# Patient Record
Sex: MALE | Race: WHITE | NOT HISPANIC OR LATINO | Employment: FULL TIME | URBAN - METROPOLITAN AREA
[De-identification: names, ages, dates, MRNs, and addresses within clinical notes are randomized per-mention and may not be internally consistent; named-entity substitution may affect disease eponyms.]

---

## 2017-01-04 ENCOUNTER — ALLSCRIPTS OFFICE VISIT (OUTPATIENT)
Dept: OTHER | Facility: OTHER | Age: 57
End: 2017-01-04

## 2017-03-24 ENCOUNTER — GENERIC CONVERSION - ENCOUNTER (OUTPATIENT)
Dept: OTHER | Facility: OTHER | Age: 57
End: 2017-03-24

## 2017-05-18 ENCOUNTER — ALLSCRIPTS OFFICE VISIT (OUTPATIENT)
Dept: OTHER | Facility: OTHER | Age: 57
End: 2017-05-18

## 2017-08-31 ENCOUNTER — ALLSCRIPTS OFFICE VISIT (OUTPATIENT)
Dept: OTHER | Facility: OTHER | Age: 57
End: 2017-08-31

## 2017-09-08 ENCOUNTER — GENERIC CONVERSION - ENCOUNTER (OUTPATIENT)
Dept: OTHER | Facility: OTHER | Age: 57
End: 2017-09-08

## 2017-09-08 DIAGNOSIS — E78.5 HYPERLIPIDEMIA: ICD-10-CM

## 2017-09-08 DIAGNOSIS — J30.89 OTHER ALLERGIC RHINITIS: ICD-10-CM

## 2017-09-08 DIAGNOSIS — E66.01 MORBID (SEVERE) OBESITY DUE TO EXCESS CALORIES (HCC): ICD-10-CM

## 2017-09-08 DIAGNOSIS — Z12.5 ENCOUNTER FOR SCREENING FOR MALIGNANT NEOPLASM OF PROSTATE: ICD-10-CM

## 2017-11-21 ENCOUNTER — GENERIC CONVERSION - ENCOUNTER (OUTPATIENT)
Dept: OTHER | Facility: OTHER | Age: 57
End: 2017-11-21

## 2017-11-21 ENCOUNTER — LAB CONVERSION - ENCOUNTER (OUTPATIENT)
Dept: OTHER | Facility: OTHER | Age: 57
End: 2017-11-21

## 2017-11-21 LAB
CHOLEST SERPL-MCNC: 133 MG/DL
CHOLEST/HDLC SERPL: 3.2 (CALC)
CREATININE, RANDOM URINE (HISTORICAL): 85 MG/DL (ref 20–370)
HBA1C MFR BLD HPLC: 7.4 % OF TOTAL HGB
HDLC SERPL-MCNC: 42 MG/DL
LDL CHOLESTEROL (HISTORICAL): 73 MG/DL (CALC)
MAGNESIUM, UR (HISTORICAL): 0.5 MG/DL
MICROALBUMIN/CREATININE RATIO (HISTORICAL): 6 MCG/MG CREAT
NON-HDL-CHOL (CHOL-HDL) (HISTORICAL): 91 MG/DL (CALC)
PROSTATE SPECIFIC ANTIGEN TOTAL (HISTORICAL): 0.5 NG/ML
TRIGL SERPL-MCNC: 95 MG/DL

## 2017-12-13 ENCOUNTER — GENERIC CONVERSION - ENCOUNTER (OUTPATIENT)
Dept: OTHER | Facility: OTHER | Age: 57
End: 2017-12-13

## 2017-12-30 LAB
LEFT EYE DIABETIC RETINOPATHY: NORMAL
RIGHT EYE DIABETIC RETINOPATHY: NORMAL

## 2018-01-12 NOTE — RESULT NOTES
Verified Results  (1) COMPREHENSIVE METABOLIC PANEL [id number] 13:[address] [place] [address] [place]     Test Name Result Flag Reference   GLUCOSE 136 mg/dL H 65-99   Fasting reference interval     For someone without known diabetes, a glucose  value >125 mg/dL indicates that they may have  diabetes and this should be confirmed with a  follow-up test    UREA NITROGEN (BUN) 11 mg/dL  7-25   CREATININE 0 96 mg/dL  0 70-1 33   For patients >52years of age, the reference limit  for Creatinine is approximately 13% higher for people  identified as -American  eGFR NON-AFR  AMERICAN 87 mL/min/1 73m2  > OR = 60   eGFR AFRICAN AMERICAN 101 mL/min/1 73m2  > OR = 60   BUN/CREATININE RATIO   8-00   NOT APPLICABLE (calc)   SODIUM 139 mmol/L  135-146   POTASSIUM 4 3 mmol/L  3 5-5 3   CHLORIDE 102 mmol/L     CARBON DIOXIDE 31 mmol/L  20-31   CALCIUM 9 5 mg/dL  8 6-10 3   PROTEIN, TOTAL 7 0 g/dL  6 1-8 1   ALBUMIN 4 3 g/dL  3 6-5 1   GLOBULIN 2 7 g/dL (calc)  1 9-3 7   ALBUMIN/GLOBULIN RATIO 1 6 (calc)  1 0-2 5   BILIRUBIN, TOTAL 0 7 mg/dL  0 2-1 2   ALKALINE PHOSPHATASE 73 U/L     AST 12 U/L  10-35   ALT 10 U/L  9-46       Plan  Poorly controlled type 2 diabetes mellitus    · (Q) MICROALBUMIN, RANDOM URINE (W/CREATININE) ; every 1 year;  Last  [id number]; Status:Active

## 2018-01-13 VITALS
BODY MASS INDEX: 35.93 KG/M2 | WEIGHT: 251 LBS | HEART RATE: 76 BPM | RESPIRATION RATE: 18 BRPM | TEMPERATURE: 99.5 F | HEIGHT: 70 IN | DIASTOLIC BLOOD PRESSURE: 78 MMHG | SYSTOLIC BLOOD PRESSURE: 134 MMHG

## 2018-01-13 VITALS
SYSTOLIC BLOOD PRESSURE: 130 MMHG | WEIGHT: 247.04 LBS | RESPIRATION RATE: 18 BRPM | HEART RATE: 100 BPM | BODY MASS INDEX: 35.37 KG/M2 | HEIGHT: 70 IN | DIASTOLIC BLOOD PRESSURE: 80 MMHG

## 2018-01-14 VITALS
SYSTOLIC BLOOD PRESSURE: 116 MMHG | HEART RATE: 72 BPM | DIASTOLIC BLOOD PRESSURE: 72 MMHG | TEMPERATURE: 98.5 F | BODY MASS INDEX: 34.09 KG/M2 | WEIGHT: 243.5 LBS | HEIGHT: 71 IN | OXYGEN SATURATION: 99 %

## 2018-01-22 VITALS
SYSTOLIC BLOOD PRESSURE: 120 MMHG | DIASTOLIC BLOOD PRESSURE: 80 MMHG | RESPIRATION RATE: 16 BRPM | OXYGEN SATURATION: 98 % | HEIGHT: 70 IN | WEIGHT: 247.25 LBS | BODY MASS INDEX: 35.4 KG/M2 | HEART RATE: 81 BPM

## 2018-01-24 VITALS
HEIGHT: 70 IN | RESPIRATION RATE: 18 BRPM | DIASTOLIC BLOOD PRESSURE: 84 MMHG | HEART RATE: 84 BPM | TEMPERATURE: 97.3 F | SYSTOLIC BLOOD PRESSURE: 136 MMHG | BODY MASS INDEX: 36.86 KG/M2 | WEIGHT: 257.5 LBS

## 2018-02-23 ENCOUNTER — TELEPHONE (OUTPATIENT)
Dept: FAMILY MEDICINE CLINIC | Facility: CLINIC | Age: 58
End: 2018-02-23

## 2018-02-23 NOTE — TELEPHONE ENCOUNTER
Is not typically that easy , that we just write a prescription for it  Talk to triage about how to get a new CPAP

## 2018-03-06 DIAGNOSIS — G47.33 SLEEP APNEA, OBSTRUCTIVE: Primary | ICD-10-CM

## 2019-01-16 DIAGNOSIS — E11.65 POORLY CONTROLLED DIABETES MELLITUS (HCC): Primary | ICD-10-CM

## 2019-01-16 RX ORDER — FLUTICASONE PROPIONATE 50 MCG
2 SPRAY, SUSPENSION (ML) NASAL DAILY PRN
COMMUNITY
Start: 2017-09-08 | End: 2022-05-27 | Stop reason: SDUPTHER

## 2019-01-18 ENCOUNTER — OFFICE VISIT (OUTPATIENT)
Dept: FAMILY MEDICINE CLINIC | Facility: CLINIC | Age: 59
End: 2019-01-18
Payer: COMMERCIAL

## 2019-01-18 VITALS
WEIGHT: 249 LBS | HEIGHT: 70 IN | DIASTOLIC BLOOD PRESSURE: 88 MMHG | HEART RATE: 92 BPM | OXYGEN SATURATION: 98 % | BODY MASS INDEX: 35.65 KG/M2 | SYSTOLIC BLOOD PRESSURE: 122 MMHG

## 2019-01-18 DIAGNOSIS — E66.01 MORBID OBESITY (HCC): ICD-10-CM

## 2019-01-18 DIAGNOSIS — Z23 FLU VACCINE NEED: ICD-10-CM

## 2019-01-18 DIAGNOSIS — E66.9 OBESITY (BMI 30-39.9): ICD-10-CM

## 2019-01-18 DIAGNOSIS — I10 HYPERTENSION, UNSPECIFIED TYPE: ICD-10-CM

## 2019-01-18 DIAGNOSIS — J45.20 MILD INTERMITTENT ASTHMA WITHOUT COMPLICATION: ICD-10-CM

## 2019-01-18 DIAGNOSIS — Z11.59 ENCOUNTER FOR HEPATITIS C SCREENING TEST FOR LOW RISK PATIENT: ICD-10-CM

## 2019-01-18 DIAGNOSIS — E11.65 POORLY CONTROLLED DIABETES MELLITUS (HCC): ICD-10-CM

## 2019-01-18 DIAGNOSIS — Z23 NEED FOR PNEUMOCOCCAL VACCINE: ICD-10-CM

## 2019-01-18 DIAGNOSIS — E11.65 POORLY CONTROLLED TYPE 2 DIABETES MELLITUS (HCC): Primary | ICD-10-CM

## 2019-01-18 DIAGNOSIS — E78.5 HYPERLIPIDEMIA, UNSPECIFIED HYPERLIPIDEMIA TYPE: ICD-10-CM

## 2019-01-18 DIAGNOSIS — G47.30 SLEEP APNEA, UNSPECIFIED TYPE: ICD-10-CM

## 2019-01-18 PROBLEM — E65 PANNUS, ABDOMINAL: Status: ACTIVE | Noted: 2017-12-13

## 2019-01-18 PROBLEM — J30.89 ENVIRONMENTAL AND SEASONAL ALLERGIES: Status: ACTIVE | Noted: 2017-09-08

## 2019-01-18 LAB
SL AMB POCT GLUCOSE BLD: 174
SL AMB POCT HEMOGLOBIN AIC: 9.3 (ref ?–6.5)

## 2019-01-18 PROCEDURE — 83036 HEMOGLOBIN GLYCOSYLATED A1C: CPT | Performed by: PHYSICIAN ASSISTANT

## 2019-01-18 PROCEDURE — 3008F BODY MASS INDEX DOCD: CPT | Performed by: PHYSICIAN ASSISTANT

## 2019-01-18 PROCEDURE — 90472 IMMUNIZATION ADMIN EACH ADD: CPT | Performed by: PHYSICIAN ASSISTANT

## 2019-01-18 PROCEDURE — 82948 REAGENT STRIP/BLOOD GLUCOSE: CPT | Performed by: PHYSICIAN ASSISTANT

## 2019-01-18 PROCEDURE — 3046F HEMOGLOBIN A1C LEVEL >9.0%: CPT | Performed by: PHYSICIAN ASSISTANT

## 2019-01-18 PROCEDURE — 90732 PPSV23 VACC 2 YRS+ SUBQ/IM: CPT | Performed by: PHYSICIAN ASSISTANT

## 2019-01-18 PROCEDURE — 3079F DIAST BP 80-89 MM HG: CPT | Performed by: PHYSICIAN ASSISTANT

## 2019-01-18 PROCEDURE — 90682 RIV4 VACC RECOMBINANT DNA IM: CPT | Performed by: PHYSICIAN ASSISTANT

## 2019-01-18 PROCEDURE — 1036F TOBACCO NON-USER: CPT | Performed by: PHYSICIAN ASSISTANT

## 2019-01-18 PROCEDURE — 99214 OFFICE O/P EST MOD 30 MIN: CPT | Performed by: PHYSICIAN ASSISTANT

## 2019-01-18 PROCEDURE — 3074F SYST BP LT 130 MM HG: CPT | Performed by: PHYSICIAN ASSISTANT

## 2019-01-18 PROCEDURE — 90471 IMMUNIZATION ADMIN: CPT | Performed by: PHYSICIAN ASSISTANT

## 2019-01-18 RX ORDER — ASCORBIC ACID 500 MG
TABLET ORAL DAILY
COMMUNITY

## 2019-01-18 NOTE — PROGRESS NOTES
[facility]  FAMILY PRACTICE OFFICE VISIT       NAME: Arnaud Olson  AGE: 60 y o  SEX: male       : 1960        MRN: [id number]    DATE: 2019  TIME: 10:28 AM    Assessment and Plan     Problem List Items Addressed This Visit     Asthma     Controlled without any medication currently  Will continue to monitor  Hyperlipidemia     I reviewed with the patient that his last labs were okay her when done just over a year ago  We discussed though the recommendation for statin use with the diagnosis of diabetes for cardiac protection  We can discuss further after his labs are done any returns for his next visit  Relevant Orders    Lipid Panel with Direct LDL reflex    Hypertension     Resolved after having gastric bypass surgery  Will continue to monitor  Relevant Orders    CBC and differential    Comprehensive metabolic panel    TSH, 3rd generation with Free T4 reflex    Lipid Panel with Direct LDL reflex    Microalbumin / creatinine urine ratio    Obesity (BMI 30-39  9)     Patient notes that he has been working on weight loss by juicing  He was encouraged to instead switch to eating small frequent meals that her bowels in protein and carbs  He should start exercising on a regular basis  We will continue to monitor  He is status post gastric bypass  Poorly controlled type 2 diabetes mellitus ([facility] [address] ) - Primary     Lab Results   Component Value Date    HGBA1C 9 3 (A) 2019     I reviewed with the patient that his diabetes is poorly controlled  His A1c has increased from 7 4% last year to 9 3% this year  He is only currently checking his blood sugars in the morning and reports readings of approximately 100 and 40s  He was encouraged to start checking his blood sugar 2 hr postprandial as well  His blood sugars fasting should be below 120, but would ideally eventually get below 100   His postprandial readings should be below 180, but ideally would get below 140  He was encouraged to continue with his metformin 1000 mg twice daily and was also started on Januvia 100 mg daily  He was initially referred to diabetes education  He was directed to stop using and instead start having small frequent meals throughout the day that are balanced in carbs and protein  He should also be exercising on a regular basis  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  Relevant Medications    sitaGLIPtin (JANUVIA) 100 mg tablet    Other Relevant Orders    POCT hemoglobin A1c (Completed)    CBC and differential    Comprehensive metabolic panel    TSH, 3rd generation with Free T4 reflex    Lipid Panel with Direct LDL reflex    Microalbumin / creatinine urine ratio    Ambulatory referral to Diabetic Education    POCT blood glucose (Completed)    Sleep apnea     Patient will continue to use CPAP nightly  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  He denies having any difficulty with feeling tired upon wakening or during the day             Other Visit Diagnoses     Poorly controlled diabetes mellitus ([address] )        Relevant Medications    sitaGLIPtin (JANUVIA) 100 mg tablet    Other Relevant Orders    POCT blood glucose (Completed)    Encounter for hepatitis C screening test for low risk patient        Relevant Orders    Hepatitis C antibody    Flu vaccine need        Relevant Orders    PREFERRED: influenza vaccine, [id number], quadrivalent, recombinant, PF, 0 5 mL, for patients 18 yr+ (FLUBLOK) (Completed)    Need for pneumococcal vaccine        Relevant Orders    PNEUMOCOCCAL POLYSACCHARIDE VACCINE 23-VALENT =>3YO SQ IM (Completed)          Poorly controlled type 2 diabetes mellitus ([facility] [address] )  Lab Results   Component Value Date    HGBA1C 9 3 (A) 01/18/2019     I reviewed with the patient that his diabetes is poorly controlled  His A1c has increased from 7 4% last year to 9 3% this year  He is only currently checking his blood sugars in the morning and reports readings of approximately 100 and 40s  He was encouraged to start checking his blood sugar 2 hr postprandial as well  His blood sugars fasting should be below 120, but would ideally eventually get below 100  His postprandial readings should be below 180, but ideally would get below 140  He was encouraged to continue with his metformin 1000 mg twice daily and was also started on Januvia 100 mg daily  He was initially referred to diabetes education  He was directed to stop using and instead start having small frequent meals throughout the day that are balanced in carbs and protein  He should also be exercising on a regular basis  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  Asthma  Controlled without any medication currently  Will continue to monitor  Sleep apnea  Patient will continue to use CPAP nightly  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  He denies having any difficulty with feeling tired upon wakening or during the day      Hypertension  Resolved after having gastric bypass surgery  Will continue to monitor  Hyperlipidemia  I reviewed with the patient that his last labs were okay her when done just over a year ago  We discussed though the recommendation for statin use with the diagnosis of diabetes for cardiac protection  We can discuss further after his labs are done any returns for his next visit  Obesity (BMI 30-39  9)  Patient notes that he has been working on weight loss by juicing  He was encouraged to instead switch to eating small frequent meals that her bowels in protein and carbs  He should start exercising on a regular basis  We will continue to monitor  He is status post gastric bypass  Chief Complaint     Chief Complaint   Patient presents with    Diabetes       History of Present Illness   Leroy Del Cid is a 60y o -year-old male who presents for follow-up on chronic problems  The patient presents today for follow-up on diabetes  At the last visit, A1c was 7 4% about a year ago  The patient reports that current diabetic medications include metformin 1000 mg twice daily  Fasting blood sugars in the morning are approximately 140s  Postprandial glucoses readings are about not checked  The patient denies hypoglycemic episodes  Last eye exam was about a year ago   Last foot exam was updated today  Today's A1c in the office is 9 3%  He notes that he has been juicing over the last month and notes that he has lost 8 pounds  He did it in the past and lost 20 pounds  He notes that he has higher glucose readings  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  He notes that needed by next Wednesday 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  The patient reports that current blood pressure medications has not been needed since surgery  The patient denies symptoms of poor control such as chest pain, shortness of breath, leg swelling, vision changes, headaches, or dizziness       The patient continues without any cholesterol medication  Last LDL was 73 in 11/2017  The patient feels that sleep apnea has been stable  Regular 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  The patient denies excessive daytime sleepiness, known apneas, or nonrestorative sleep  Since the patent's last visit, weight has decreased 8 pounds over the last month  Diet is reported to be primarily due juicing  Exercise occurs weekly for basketball  He is continuing to use powder to help prevent infection under his pannus  The patient reports that asthma control has been well-controlled - only has issues with colds  He denies a regular inhaler or albuterol inhaler  Review of Systems   Review of Systems   Constitutional: Negative for chills and fever  Respiratory: Negative for shortness of breath  Cardiovascular: Negative for chest pain, palpitations and leg swelling  Skin: Negative for rash  Neurological: Negative for dizziness and headaches  Psychiatric/Behavioral: Positive for sleep disturbance  Active Problem List     Patient Active Problem List   Diagnosis    Asthma    Basal cell carcinoma of skin    Environmental and seasonal allergies    Hyperlipidemia    Hypertension    Obesity (BMI 30-39  9)    Onychomycosis of toenail    Pannus, abdominal    Poorly controlled type 2 diabetes mellitus ([address] )    Sciatica of left side    Sleep apnea         Past Medical History:  No past medical history on file  Past Surgical History:  Past Surgical History:   Procedure Laterality Date    EAR SURGERY Right 12/2014    Basal cancer removal       Family History:  Family History   Problem Relation Age of Onset    Diabetes Family     Hypertension Family     Heart attack Family        Social History:  Social History     Social History    Marital status: /Civil Union     Spouse name: N/A    Number of children: N/A    Years of education: N/A     Occupational History    Not on file       Social History Main Topics    Smoking status: Former Smoker     Quit date: 1/1/1977    Smokeless tobacco: Never Used    Alcohol use Yes      Comment: Social     Drug use: No    Sexual activity: Not on file     Other Topics Concern    Not on file     Social History Narrative    No narrative on file       Objective     Vitals:    01/18/19 0820   BP: 122/88   BP Location: Left arm   Patient Position: Sitting   Cuff Size: Standard   Pulse: 92   SpO2: 98%   Weight: 113 kg (249 lb)   Height: 5' 10 1" (1 781 m)     Wt Readings from Last 3 Encounters:   01/18/19 113 kg (249 lb)   12/13/17 117 kg (257 lb 8 oz)   09/08/17 112 kg (247 lb 4 oz)       Physical Exam   Constitutional: He appears well-developed and well-nourished  No distress  Neck: Normal range of motion  Neck supple  No thyromegaly present  Cardiovascular: Normal rate, regular rhythm, normal heart sounds and intact distal pulses  No murmur heard  Pulmonary/Chest: Effort normal and breath sounds normal  He has no wheezes  He has no rales  Musculoskeletal: He exhibits no edema  Lymphadenopathy:     He has no cervical adenopathy  Psychiatric: He has a normal mood and affect  His behavior is normal    Vitals reviewed        Pertinent Laboratory/Diagnostic Studies:  Lab Results   Component Value Date    BUN 11 11/18/2017    CREATININE 0 96 11/18/2017    CALCIUM 9 5 11/18/2017     11/18/2017    K 4 3 11/18/2017    CO2 31 11/18/2017     11/18/2017     Lab Results   Component Value Date    ALT 10 11/18/2017    AST 12 11/18/2017    ALKPHOS 73 11/18/2017    BILITOT 0 7 11/18/2017     Lab Results   Component Value Date    CHOL 133 11/18/2017     Lab Results   Component Value Date    TRIG 95 11/18/2017     Lab Results   Component Value Date    HDL 42 11/18/2017     Lab Results   Component Value Date    LDLCALC 69 03/08/2016     Lab Results   Component Value Date    HGBA1C 9 3 (A) 01/18/2019       Results for orders placed or performed in visit on 01/18/19   POCT hemoglobin A1c   Result Value Ref Range    Hemoglobin A1C 9 3 (A) 6 5   POCT blood glucose   Result Value Ref Range    GLUCOSE           ALLERGIES:  No Known Allergies    Current Medications     Current Outpatient Prescriptions   Medication Sig Dispense Refill    fluticasone (FLONASE) 50 mcg/act nasal spray 2 sprays into each nostril daily      metFORMIN (GLUCOPHAGE) 1000 MG tablet Take 1 tablet (1,000 mg total) by mouth 2 (two) times a day 180 tablet 1    Multiple Vitamin (MULTI-DAY) TABS Take by mouth daily      sitaGLIPtin (JANUVIA) 100 mg tablet Take 1 tablet (100 mg total) by mouth daily 90 tablet 1     No current facility-administered medications for this visit            Health Maintenance     Health Maintenance   Topic Date Due    Hepatitis C Screening  1960    Depression Screening PHQ  1960    DM Eye Exam  05/22/1969    Pneumococcal PPSV23 Highest Risk Adult (1 of 3 - PCV13) 05/22/1978    DTaP,Tdap,and Td Vaccines (1 - Tdap) 05/22/1980    Diabetic Foot Exam  10/03/2015    HEMOGLOBIN A1C  05/18/2018    INFLUENZA VACCINE  07/01/2018    URINE MICROALBUMIN  11/18/2018    CRC Screening: Colonoscopy  03/24/2020     Immunization History   Administered Date(s) Administered    Influenza Quadrivalent Preservative Free 3 years and older IM 10/03/2014, 11/25/2016, 12/13/2017    Influenza TIV (IM) 01/30/2013, 11/29/2013    Influenza, recombinant, quadrivalent,injectable, preservative free 01/18/2019    Pneumococcal Polysaccharide PPV23 01/18/2019       Beryl Baldwin PA-C  1/18/2019 10:28 AM  [facility]

## 2019-01-18 NOTE — ASSESSMENT & PLAN NOTE
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

## 2019-01-18 NOTE — PROGRESS NOTES
Patient's shoes and socks removed  Right Foot/Ankle   Right Foot Inspection  Skin Exam: dry skin                            Sensory       Monofilament testing: diminished  Vascular    The right PT pulse is 2+  Left Foot/Ankle  Left Foot Inspection  Skin Exam: dry skin                                           Vascular    The left PT pulse is 2+  Assign Risk Category:  No deformity present; Loss of protective sensation;  No weak pulses       Risk: 1

## 2019-01-18 NOTE — ASSESSMENT & PLAN NOTE
Lab Results   Component Value Date    HGBA1C 9 3 (A) 01/18/2019     I reviewed with the patient that his diabetes is poorly controlled  His A1c has increased from 7 4% last year to 9 3% this year  He is only currently checking his blood sugars in the morning and reports readings of approximately 100 and 40s  He was encouraged to start checking his blood sugar 2 hr postprandial as well  His blood sugars fasting should be below 120, but would ideally eventually get below 100  His postprandial readings should be below 180, but ideally would get below 140  He was encouraged to continue with his metformin 1000 mg twice daily and was also started on Januvia 100 mg daily  He was initially referred to diabetes education  He was directed to stop using and instead start having small frequent meals throughout the day that are balanced in carbs and protein  He should also be exercising on a regular basis  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

## 2019-01-18 NOTE — ASSESSMENT & PLAN NOTE
Patient notes that he has been working on weight loss by juicing  He was encouraged to instead switch to eating small frequent meals that her bowels in protein and carbs  He should start exercising on a regular basis  We will continue to monitor  He is status post gastric bypass

## 2019-01-18 NOTE — ASSESSMENT & PLAN NOTE
I reviewed with the patient that his last labs were okay her when done just over a year ago  We discussed though the recommendation for statin use with the diagnosis of diabetes for cardiac protection  We can discuss further after his labs are done any returns for his next visit

## 2019-01-21 LAB
ALBUMIN SERPL-MCNC: 4.4 G/DL (ref 3.6–5.1)
ALBUMIN/CREAT UR: 8 MCG/MG CREAT
ALBUMIN/GLOB SERPL: 1.8 (CALC) (ref 1–2.5)
ALP SERPL-CCNC: 63 U/L (ref 40–115)
ALT SERPL-CCNC: 13 U/L (ref 9–46)
AST SERPL-CCNC: 17 U/L (ref 10–35)
BASOPHILS # BLD AUTO: 53 CELLS/UL (ref 0–200)
BASOPHILS NFR BLD AUTO: 0.7 %
BILIRUB SERPL-MCNC: 0.8 MG/DL (ref 0.2–1.2)
BUN SERPL-MCNC: 11 MG/DL (ref 7–25)
BUN/CREAT SERPL: ABNORMAL (CALC) (ref 6–22)
CALCIUM SERPL-MCNC: 9.6 MG/DL (ref 8.6–10.3)
CHLORIDE SERPL-SCNC: 101 MMOL/L (ref 98–110)
CHOLEST SERPL-MCNC: 157 MG/DL
CHOLEST/HDLC SERPL: 4 (CALC)
CO2 SERPL-SCNC: 31 MMOL/L (ref 20–32)
CREAT SERPL-MCNC: 0.87 MG/DL (ref 0.7–1.33)
CREAT UR-MCNC: 148 MG/DL (ref 20–320)
EOSINOPHIL # BLD AUTO: 213 CELLS/UL (ref 15–500)
EOSINOPHIL NFR BLD AUTO: 2.8 %
ERYTHROCYTE [DISTWIDTH] IN BLOOD BY AUTOMATED COUNT: 12.7 % (ref 11–15)
GLOBULIN SER CALC-MCNC: 2.5 G/DL (CALC) (ref 1.9–3.7)
GLUCOSE SERPL-MCNC: 167 MG/DL (ref 65–99)
HCT VFR BLD AUTO: 43.2 % (ref 38.5–50)
HCV AB S/CO SERPL IA: 0.01
HCV AB SERPL QL IA: NORMAL
HDLC SERPL-MCNC: 39 MG/DL
HGB BLD-MCNC: 14.9 G/DL (ref 13.2–17.1)
LDLC SERPL CALC-MCNC: 97 MG/DL (CALC)
LYMPHOCYTES # BLD AUTO: 2158 CELLS/UL (ref 850–3900)
LYMPHOCYTES NFR BLD AUTO: 28.4 %
MCH RBC QN AUTO: 30.1 PG (ref 27–33)
MCHC RBC AUTO-ENTMCNC: 34.5 G/DL (ref 32–36)
MCV RBC AUTO: 87.3 FL (ref 80–100)
MICROALBUMIN UR-MCNC: 1.2 MG/DL
MONOCYTES # BLD AUTO: 600 CELLS/UL (ref 200–950)
MONOCYTES NFR BLD AUTO: 7.9 %
NEUTROPHILS # BLD AUTO: 4575 CELLS/UL (ref 1500–7800)
NEUTROPHILS NFR BLD AUTO: 60.2 %
NONHDLC SERPL-MCNC: 118 MG/DL (CALC)
PLATELET # BLD AUTO: 264 THOUSAND/UL (ref 140–400)
PMV BLD REES-ECKER: 9.7 FL (ref 7.5–12.5)
POTASSIUM SERPL-SCNC: 4.6 MMOL/L (ref 3.5–5.3)
PROT SERPL-MCNC: 6.9 G/DL (ref 6.1–8.1)
RBC # BLD AUTO: 4.95 MILLION/UL (ref 4.2–5.8)
SL AMB EGFR AFRICAN AMERICAN: 109 ML/MIN/1.73M2
SL AMB EGFR NON AFRICAN AMERICAN: 94 ML/MIN/1.73M2
SODIUM SERPL-SCNC: 139 MMOL/L (ref 135–146)
TRIGL SERPL-MCNC: 110 MG/DL
TSH SERPL-ACNC: 0.59 MIU/L (ref 0.4–4.5)
WBC # BLD AUTO: 7.6 THOUSAND/UL (ref 3.8–10.8)

## 2019-02-18 ENCOUNTER — TELEPHONE (OUTPATIENT)
Dept: FAMILY MEDICINE CLINIC | Facility: CLINIC | Age: 59
End: 2019-02-18

## 2019-02-18 DIAGNOSIS — E11.65 POORLY CONTROLLED TYPE 2 DIABETES MELLITUS (HCC): Primary | ICD-10-CM

## 2019-02-18 NOTE — TELEPHONE ENCOUNTER
Pt called, seen by Tiff on 1/18/19  Was put on Januvia  Pt unable to get medication as he just lost his job and cost out of pocket its expensive  Requesting alternative to much cheaper cost medication  Such as glipizide   Please advise

## 2019-02-19 NOTE — TELEPHONE ENCOUNTER
Called and notified pt as noted below  Order put in pending approval  He has canceled his one month f/u scheduled appt w/ Tiff  He will call back in a later date to reschedule and follow up with a poct A1c in three months  As that visit was a follow up on Januvia, in which he has not started  Please click on encounter to approve med

## 2019-02-20 RX ORDER — GLIPIZIDE 10 MG/1
10 TABLET, FILM COATED, EXTENDED RELEASE ORAL DAILY
Qty: 90 TABLET | Refills: 0 | Status: SHIPPED | OUTPATIENT
Start: 2019-02-20 | End: 2019-06-04 | Stop reason: SDUPTHER

## 2019-06-04 DIAGNOSIS — E11.65 POORLY CONTROLLED TYPE 2 DIABETES MELLITUS (HCC): ICD-10-CM

## 2019-06-04 RX ORDER — GLIPIZIDE 10 MG/1
TABLET, FILM COATED, EXTENDED RELEASE ORAL
Qty: 90 TABLET | Refills: 1 | Status: SHIPPED | OUTPATIENT
Start: 2019-06-04 | End: 2019-12-06 | Stop reason: SDUPTHER

## 2019-06-12 ENCOUNTER — TELEPHONE (OUTPATIENT)
Dept: FAMILY MEDICINE CLINIC | Facility: CLINIC | Age: 59
End: 2019-06-12

## 2019-06-13 ENCOUNTER — OFFICE VISIT (OUTPATIENT)
Dept: FAMILY MEDICINE CLINIC | Facility: CLINIC | Age: 59
End: 2019-06-13
Payer: COMMERCIAL

## 2019-06-13 VITALS
OXYGEN SATURATION: 97 % | HEART RATE: 80 BPM | DIASTOLIC BLOOD PRESSURE: 80 MMHG | BODY MASS INDEX: 34.72 KG/M2 | TEMPERATURE: 97.9 F | SYSTOLIC BLOOD PRESSURE: 120 MMHG | WEIGHT: 242.5 LBS | HEIGHT: 70 IN

## 2019-06-13 DIAGNOSIS — E11.9 TYPE 2 DIABETES MELLITUS WITHOUT COMPLICATION, WITHOUT LONG-TERM CURRENT USE OF INSULIN (HCC): ICD-10-CM

## 2019-06-13 DIAGNOSIS — H61.23 BILATERAL IMPACTED CERUMEN: Primary | ICD-10-CM

## 2019-06-13 DIAGNOSIS — E11.65 POORLY CONTROLLED TYPE 2 DIABETES MELLITUS (HCC): ICD-10-CM

## 2019-06-13 DIAGNOSIS — E11.3293 TYPE 2 DIABETES MELLITUS WITH MILD NONPROLIFERATIVE RETINOPATHY OF BOTH EYES, WITHOUT LONG-TERM CURRENT USE OF INSULIN, MACULAR EDEMA PRESENCE UNSPECIFIED (HCC): ICD-10-CM

## 2019-06-13 LAB — SL AMB POCT HEMOGLOBIN AIC: 7.2 (ref ?–6.5)

## 2019-06-13 PROCEDURE — 99213 OFFICE O/P EST LOW 20 MIN: CPT | Performed by: PHYSICIAN ASSISTANT

## 2019-06-13 PROCEDURE — 83036 HEMOGLOBIN GLYCOSYLATED A1C: CPT | Performed by: PHYSICIAN ASSISTANT

## 2019-06-13 PROCEDURE — 3008F BODY MASS INDEX DOCD: CPT | Performed by: PHYSICIAN ASSISTANT

## 2019-06-13 PROCEDURE — 69209 REMOVE IMPACTED EAR WAX UNI: CPT | Performed by: PHYSICIAN ASSISTANT

## 2019-06-13 PROCEDURE — 3045F PR MOST RECENT HEMOGLOBIN A1C LEVEL 7.0-9.0%: CPT | Performed by: INTERNAL MEDICINE

## 2019-06-13 PROCEDURE — 1036F TOBACCO NON-USER: CPT | Performed by: PHYSICIAN ASSISTANT

## 2019-06-13 PROCEDURE — 3045F PR MOST RECENT HEMOGLOBIN A1C LEVEL 7.0-9.0%: CPT | Performed by: PHYSICIAN ASSISTANT

## 2019-07-09 ENCOUNTER — TELEPHONE (OUTPATIENT)
Dept: FAMILY MEDICINE CLINIC | Facility: CLINIC | Age: 59
End: 2019-07-09

## 2019-07-09 NOTE — TELEPHONE ENCOUNTER
Pt called and lm on nurse line stating that he tried to  the refill of his Januvia and was told they needed an approval from our office  Pt ran out of medication today  I called Alfonso Bryant and pt needs prior authorization for Januvia  Pt recently changed insurance  Pt now has [address] [place] [address] [id number]    I called kwan and was able to obtain an authorization valid for 36 months for the Januvia 100mg tablets  PA#: [id number]    Pt notified

## 2019-07-12 DIAGNOSIS — E11.65 POORLY CONTROLLED DIABETES MELLITUS (HCC): ICD-10-CM

## 2019-08-30 ENCOUNTER — OFFICE VISIT (OUTPATIENT)
Dept: FAMILY MEDICINE CLINIC | Facility: CLINIC | Age: 59
End: 2019-08-30
Payer: COMMERCIAL

## 2019-08-30 VITALS
HEART RATE: 91 BPM | HEIGHT: 70 IN | WEIGHT: 244.6 LBS | TEMPERATURE: 97.2 F | OXYGEN SATURATION: 96 % | BODY MASS INDEX: 35.02 KG/M2 | DIASTOLIC BLOOD PRESSURE: 74 MMHG | SYSTOLIC BLOOD PRESSURE: 118 MMHG

## 2019-08-30 DIAGNOSIS — S16.1XXA ACUTE STRAIN OF NECK MUSCLE, INITIAL ENCOUNTER: Primary | ICD-10-CM

## 2019-08-30 PROCEDURE — 99213 OFFICE O/P EST LOW 20 MIN: CPT | Performed by: FAMILY MEDICINE

## 2019-08-30 RX ORDER — MELOXICAM 15 MG/1
15 TABLET ORAL DAILY PRN
Qty: 15 TABLET | Refills: 0 | Status: SHIPPED | OUTPATIENT
Start: 2019-08-30 | End: 2019-09-16 | Stop reason: ALTCHOICE

## 2019-08-30 NOTE — PATIENT INSTRUCTIONS
Has acute strain left cervical with radiation into left shoulder/arm, overuse appears related to rafting trip he had been on  He can use heat as needed, increased range of motion as tolerated, can use meloxicam 15 mg once daily with food as needed for pain, note he did have bypass surgery in 2015  Do not use long-term NSAID  Should slowly resolve over the next 1 to 2 weeks, call if worsens  As he is diabetic we will hold off on oral steroid  A1c back in June 7 2, glucose 128 the other day

## 2019-08-30 NOTE — PROGRESS NOTES
FAMILY PRACTICE OFFICE VISIT  [facility]  [address]  [address] [place], [place], [address]      NAME: Sowmya Michael  AGE: 60 y o  SEX: male  : 1960   MRN: [id number]    DATE: 2019  TIME: 2:55 PM    Assessment and Plan     Problem List Items Addressed This Visit     None      Visit Diagnoses     Acute strain of neck muscle, initial encounter    -  Primary    Relevant Medications    meloxicam (MOBIC) 15 mg tablet          Patient Instructions   Has acute strain left cervical with radiation into left shoulder/arm, overuse appears related to rafting trip he had been on  He can use heat as needed, increased range of motion as tolerated, can use meloxicam 15 mg once daily with food as needed for pain, note he did have bypass surgery in   Do not use long-term NSAID  Should slowly resolve over the next 1 to 2 weeks, call if worsens  As he is diabetic we will hold off on oral steroid  A1c back in  2, glucose 128 the other day  Chief Complaint     Chief Complaint   Patient presents with    Shoulder Pain       History of Present Illness   Sowmya Michael is a 60y o -year-old male who had been on a raft on [facility] 1 week ago, few days later with increased left neck, shoulder, arm pain  No weakness in arm  Review of Systems   Review of Systems   Constitutional:        Otherwise has felt okay, had been biking for exercise  Sugars have been okay, no chest pain, shortness of breath, change in bowel  Had bariatric procedure , no stomach complaints       Active Problem List     Patient Active Problem List   Diagnosis    Asthma    Basal cell carcinoma of skin    Environmental and seasonal allergies    Hyperlipidemia    Hypertension    Obesity (BMI 30-39  9)    Onychomycosis of toenail    Pannus, abdominal    Type 2 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, without long-term current use of insulin (HCC)    Sciatica of left side    Sleep apnea    Bilateral impacted cerumen       Past Medical History:  Reviewed    Past Surgical History:  Reviewed    Family History:  Reviewed    Social History:  Reviewed    Objective     Vitals:    08/30/19 1430   BP: 118/74   BP Location: Left arm   Patient Position: Sitting   Cuff Size: Large   Pulse: 91   Temp: (!) 97 2 °F (36 2 °C)   TempSrc: Tympanic   SpO2: 96%   Weight: 111 kg (244 lb 9 6 oz)   Height: 5' 10" (1 778 m)     Body mass index is 35 1 kg/m²  BP Readings from Last 3 Encounters:   08/30/19 118/74   06/13/19 120/80   01/18/19 122/88       Wt Readings from Last 3 Encounters:   08/30/19 111 kg (244 lb 9 6 oz)   06/13/19 110 kg (242 lb 8 oz)   01/18/19 113 kg (249 lb)       Physical Exam   Constitutional:   Obese male seated no acute distress   Musculoskeletal:   Tenderness left lower cervical/supra scapular on left, very mild decrease range of motion left shoulder, no weakness in arm, DTR intact  Good  strength  ALLERGIES:  No Known Allergies    Current Medications     Current Outpatient Medications   Medication Sig Dispense Refill    fluticasone (FLONASE) 50 mcg/act nasal spray 2 sprays into each nostril daily as needed       glipiZIDE (GLUCOTROL XL) 10 mg 24 hr tablet TAKE 1 TABLET BY MOUTH ONCE DAILY 90 tablet 1    metFORMIN (GLUCOPHAGE) 1000 MG tablet TAKE 1 TABLET BY MOUTH TWICE DAILY 180 tablet 1    Multiple Vitamin (MULTI-DAY) TABS Take by mouth daily      sitaGLIPtin (JANUVIA) 100 mg tablet Take 1 tablet (100 mg total) by mouth daily 90 tablet 1    meloxicam (MOBIC) 15 mg tablet Take 1 tablet (15 mg total) by mouth daily as needed (neck/ arm pain) 15 tablet 0     No current facility-administered medications for this visit  No orders of the defined types were placed in this encounter          Evelena Shape, DO

## 2019-09-16 ENCOUNTER — OFFICE VISIT (OUTPATIENT)
Dept: FAMILY MEDICINE CLINIC | Facility: CLINIC | Age: 59
End: 2019-09-16
Payer: COMMERCIAL

## 2019-09-16 VITALS
RESPIRATION RATE: 18 BRPM | WEIGHT: 245.6 LBS | HEART RATE: 86 BPM | DIASTOLIC BLOOD PRESSURE: 78 MMHG | TEMPERATURE: 98.6 F | OXYGEN SATURATION: 98 % | HEIGHT: 70 IN | BODY MASS INDEX: 35.16 KG/M2 | SYSTOLIC BLOOD PRESSURE: 122 MMHG

## 2019-09-16 DIAGNOSIS — M54.2 NECK PAIN, MUSCULOSKELETAL: Primary | ICD-10-CM

## 2019-09-16 DIAGNOSIS — Z23 NEED FOR INFLUENZA VACCINATION: ICD-10-CM

## 2019-09-16 PROCEDURE — 90471 IMMUNIZATION ADMIN: CPT | Performed by: INTERNAL MEDICINE

## 2019-09-16 PROCEDURE — 3008F BODY MASS INDEX DOCD: CPT | Performed by: INTERNAL MEDICINE

## 2019-09-16 PROCEDURE — 90682 RIV4 VACC RECOMBINANT DNA IM: CPT | Performed by: INTERNAL MEDICINE

## 2019-09-16 PROCEDURE — 99213 OFFICE O/P EST LOW 20 MIN: CPT | Performed by: INTERNAL MEDICINE

## 2019-09-16 RX ORDER — GABAPENTIN 100 MG/1
CAPSULE ORAL
Qty: 60 CAPSULE | Refills: 0 | Status: SHIPPED | OUTPATIENT
Start: 2019-09-16 | End: 2020-02-07

## 2019-09-16 NOTE — PROGRESS NOTES
Assessment/Plan:     Diagnoses and all orders for this visit:    Neck pain, musculoskeletal  -     gabapentin (NEURONTIN) 100 mg capsule; Take 100 mg twice daily  If tolerating but still painful, increase to 100 mg during the day and 200 mg at bedtime  Need for influenza vaccination  -     influenza vaccine, [id number], quadrivalent, recombinant, PF, 0 5 mL, for patients 18 yr+ (FLUBLOK)     We discussed doing a trial of gabapentin to see if this helps with his pain that starts in the neck and seems to travel down the arm as the Mobic did not  He will monitor this and tell us how he does  Otherwise no other changes were made  Subjective:      Patient ID: Mercy Lowry is a 60 y o  male  Kalpeshjose Alvarezmateo is here today for continued left sided arm pain that is present for a few a weeks  He reports a rafting trip but does not recall exactly injuring it there  He notes that it travels from the neck down  There may be some numbness/tingling  Denies chest symptoms or weakness  He tried the mobic without much relief  Icy hot seems to help somewhat  The following portions of the patient's history were reviewed and updated as appropriate: allergies, current medications, past family history, past medical history, past social history, past surgical history and problem list     Review of Systems   Constitutional: Negative for chills, fever and unexpected weight change  Respiratory: Negative for cough and chest tightness  Cardiovascular: Negative for chest pain  Musculoskeletal: Positive for arthralgias, myalgias and neck pain  Neurological: Negative for numbness  Psychiatric/Behavioral: Negative for sleep disturbance  Objective:      /78   Pulse 86   Temp 98 6 °F (37 °C)   Resp 18   Ht 5' 10" (1 778 m)   Wt 111 kg (245 lb 9 6 oz)   SpO2 98%   BMI 35 24 kg/m²          Physical Exam   Constitutional: He is oriented to person, place, and time  He appears well-developed and well-nourished     HENT: Head: Normocephalic and atraumatic  Eyes: Conjunctivae are normal    Neck:   Some subjective pain on rotation of the neck   Cardiovascular: Normal rate and regular rhythm  Pulmonary/Chest: Effort normal and breath sounds normal    Musculoskeletal: Normal range of motion  He exhibits no tenderness  Neurological: He is alert and oriented to person, place, and time  Skin: Skin is warm and dry  Psychiatric: He has a normal mood and affect   His behavior is normal  Judgment and thought content normal

## 2019-09-26 ENCOUNTER — TELEPHONE (OUTPATIENT)
Dept: FAMILY MEDICINE CLINIC | Facility: CLINIC | Age: 59
End: 2019-09-26

## 2019-09-26 DIAGNOSIS — M54.32 SCIATICA OF LEFT SIDE: Primary | ICD-10-CM

## 2019-09-26 NOTE — TELEPHONE ENCOUNTER
Pt called back and stated he will try [facility] but hopes to get an MRI right away    I told him about PT and he will consider it  I put in a Ortho ref  Pt states he gets no relief from the gabapentin and he takes 200mg at night time

## 2019-09-26 NOTE — TELEPHONE ENCOUNTER
Pt called stating he is still having shoulder pain  Pt would like to know what he should do at this point, if we want him to have some imaging done or refer him somewhere

## 2019-09-26 NOTE — TELEPHONE ENCOUNTER
Think he has been having this over a month  No relief with gabapentin either? I would have him see ortho (OALEIGH or Brianna Jain)  Would he also want to start PT as there is a chance they may recommend this as well

## 2019-09-27 NOTE — TELEPHONE ENCOUNTER
Dennie Jabs keep us updated but he can increase the gabapentin as well and see if there is benefit   Take it during the day and see if it helps

## 2019-10-04 DIAGNOSIS — E11.65 POORLY CONTROLLED TYPE 2 DIABETES MELLITUS (HCC): ICD-10-CM

## 2019-10-05 RX ORDER — SITAGLIPTIN 100 MG/1
TABLET, FILM COATED ORAL
Qty: 90 TABLET | Refills: 0 | Status: SHIPPED | OUTPATIENT
Start: 2019-10-05 | End: 2019-10-09 | Stop reason: SDUPTHER

## 2019-12-06 DIAGNOSIS — E11.65 POORLY CONTROLLED TYPE 2 DIABETES MELLITUS (HCC): ICD-10-CM

## 2019-12-06 RX ORDER — GLIPIZIDE 10 MG/1
10 TABLET, FILM COATED, EXTENDED RELEASE ORAL DAILY
Qty: 90 TABLET | Refills: 0 | Status: SHIPPED | OUTPATIENT
Start: 2019-12-06 | End: 2020-02-18

## 2019-12-12 ENCOUNTER — OFFICE VISIT (OUTPATIENT)
Dept: URGENT CARE | Age: 59
End: 2019-12-12
Payer: COMMERCIAL

## 2019-12-12 VITALS
OXYGEN SATURATION: 97 % | SYSTOLIC BLOOD PRESSURE: 142 MMHG | BODY MASS INDEX: 37.19 KG/M2 | HEIGHT: 70 IN | WEIGHT: 259.8 LBS | HEART RATE: 82 BPM | DIASTOLIC BLOOD PRESSURE: 76 MMHG | TEMPERATURE: 97.3 F | RESPIRATION RATE: 18 BRPM

## 2019-12-12 DIAGNOSIS — L02.212 ABSCESS OF BACK: Primary | ICD-10-CM

## 2019-12-12 PROCEDURE — G0382 LEV 3 HOSP TYPE B ED VISIT: HCPCS | Performed by: PHYSICIAN ASSISTANT

## 2019-12-12 PROCEDURE — 87186 SC STD MICRODIL/AGAR DIL: CPT | Performed by: PHYSICIAN ASSISTANT

## 2019-12-12 PROCEDURE — 87205 SMEAR GRAM STAIN: CPT | Performed by: PHYSICIAN ASSISTANT

## 2019-12-12 PROCEDURE — 10060 I&D ABSCESS SIMPLE/SINGLE: CPT | Performed by: PHYSICIAN ASSISTANT

## 2019-12-12 PROCEDURE — 87070 CULTURE OTHR SPECIMN AEROBIC: CPT | Performed by: PHYSICIAN ASSISTANT

## 2019-12-12 RX ORDER — SULFAMETHOXAZOLE AND TRIMETHOPRIM 800; 160 MG/1; MG/1
1 TABLET ORAL EVERY 12 HOURS SCHEDULED
Qty: 14 TABLET | Refills: 0 | Status: SHIPPED | OUTPATIENT
Start: 2019-12-12 | End: 2019-12-19

## 2019-12-12 RX ORDER — CEPHALEXIN 500 MG/1
500 CAPSULE ORAL EVERY 6 HOURS SCHEDULED
Qty: 28 CAPSULE | Refills: 0 | Status: SHIPPED | OUTPATIENT
Start: 2019-12-12 | End: 2019-12-19

## 2019-12-13 NOTE — PROGRESS NOTES
[facility]        NAME: Ramos Gagnon is a 60 y o  male  : 1960    MRN: [id number]  DATE: 2019  TIME: 9:39 PM    Assessment and Plan   Abscess of back [L02 212]  1  Abscess of back  Wound culture and Gram stain    sulfamethoxazole-trimethoprim (BACTRIM DS) 800-160 mg per tablet    cephalexin (KEFLEX) 500 mg capsule     Incision and drain  Date/Time: 2019 9:37 PM  Performed by: Breanna Herrera PA-C  Authorized by: Breanna Herrera PA-C     Patient location:  Bedside  Other Assisting Provider: Yes (comment)    Consent:     Consent obtained:  Verbal    Consent given by:  Patient    Risks discussed:  Bleeding, incomplete drainage, pain, infection and damage to other organs    Alternatives discussed:  Delayed treatment  Universal protocol:     Procedure explained and questions answered to patient or proxy's satisfaction: yes      Patient identity confirmed:  Verbally with patient  Location:     Type:  Abscess    Location:  Trunk    Trunk location:  Back  Pre-procedure details:     Skin preparation:  Chloraprep  Anesthesia (see MAR for exact dosages): Anesthesia method:  Local infiltration    Local anesthetic:  Lidocaine 1% WITH epi  Procedure details:     Complexity:  Simple    Needle aspiration: no      Incision types:  Single straight    Scalpel blade:  11    Approach:  Open    Incision depth:  Subcutaneous    Wound management:  Probed and deloculated    Drainage:  Purulent and bloody    Drainage amount:  Copious    Wound treatment:  Packing placed    Packing materials:  1/4 in iodoform gauze    Amount 1/4" iodoform:  30cm   Post-procedure details:     Patient tolerance of procedure: Tolerated well, no immediate complications          Patient Instructions     Follow up with PCP in 2 days FOR DRESSING / PACKING CHANGE   Proceed to  ER if symptoms worsen  Patient will begin Cephalexin and bactrim as directed   Monitor symptoms closely     Motrin / tylenol as needed for pain  Keep area clean and dry at all times    Chief Complaint     Chief Complaint   Patient presents with    Skin Problem     x3-4 days, large red bump on pt right upper flank  pt states it was itchy in the beginning but is now painful  History of Present Illness       Patient 67-year-old male presenting the office for abscess  Patient states that symptoms ongoing past 3-4 days duration  Patient denies any bug bites the site  Patient states the site started off as  Itchy and has progressed to becoming painful and hard to touch  Patient states that he has been  Applying heat to the site with minimal relief of symptoms  Patient denies any fevers any chills  Patient denies any shortness of breath chest tightness  Patient denies any nausea vomiting diarrhea  Patient denies any medication for pain of the site  Patient offers no other complaints at this time  Rash   This is a new problem  The current episode started in the past 7 days  The problem has been gradually worsening since onset  The affected locations include the torso  The rash is characterized by redness  He was exposed to nothing  Pertinent negatives include no anorexia, congestion, cough, diarrhea, eye pain, facial edema, fatigue, fever, joint pain, nail changes, rhinorrhea, shortness of breath, sore throat or vomiting  Past treatments include nothing  The treatment provided no relief  There is no history of allergies, asthma, eczema or varicella  Review of Systems   Review of Systems   Constitutional: Negative  Negative for fatigue and fever  HENT: Negative  Negative for congestion, rhinorrhea and sore throat  Eyes: Negative  Negative for pain  Respiratory: Negative  Negative for cough and shortness of breath  Cardiovascular: Negative  Gastrointestinal: Negative  Negative for anorexia, diarrhea and vomiting  Endocrine: Negative  Genitourinary: Negative  Musculoskeletal: Negative    Negative for joint pain    Skin: Positive for rash  Negative for color change, nail changes, pallor and wound  Allergic/Immunologic: Negative  Neurological: Negative  Hematological: Negative  Psychiatric/Behavioral: Negative  Current Medications       Current Outpatient Medications:     glipiZIDE (GLUCOTROL XL) 10 mg 24 hr tablet, Take 1 tablet (10 mg total) by mouth daily, Disp: 90 tablet, Rfl: 0    metFORMIN (GLUCOPHAGE) 1000 MG tablet, TAKE 1 TABLET BY MOUTH TWICE DAILY, Disp: 180 tablet, Rfl: 1    Multiple Vitamin (MULTI-DAY) TABS, Take by mouth daily, Disp: , Rfl:     sitaGLIPtin (JANUVIA) 100 mg tablet, Take 1 tablet (100 mg total) by mouth daily, Disp: 90 tablet, Rfl: 0    cephalexin (KEFLEX) 500 mg capsule, Take 1 capsule (500 mg total) by mouth every 6 (six) hours for 7 days, Disp: 28 capsule, Rfl: 0    fluticasone (FLONASE) 50 mcg/act nasal spray, 2 sprays into each nostril daily as needed , Disp: , Rfl:     gabapentin (NEURONTIN) 100 mg capsule, Take 100 mg twice daily  If tolerating but still painful, increase to 100 mg during the day and 200 mg at bedtime   (Patient not taking: Reported on 12/12/2019), Disp: 60 capsule, Rfl: 0    sulfamethoxazole-trimethoprim (BACTRIM DS) 800-160 mg per tablet, Take 1 tablet by mouth every 12 (twelve) hours for 7 days, Disp: 14 tablet, Rfl: 0    Current Allergies     Allergies as of 12/12/2019    (No Known Allergies)            The following portions of the patient's history were reviewed and updated as appropriate: allergies, current medications, past family history, past medical history, past social history, past surgical history and problem list      Past Medical History:   Diagnosis Date    Diabetes mellitus ([facility] [address] )        Past Surgical History:   Procedure Laterality Date    EAR SURGERY Right 12/2014    Basal cancer removal    GASTRIC BYPASS  2015       Family History   Problem Relation Age of Onset    Diabetes Family     Hypertension Family     Heart attack Family          Medications have been verified  Objective   /76 (BP Location: Left arm, Patient Position: Sitting, Cuff Size: Adult)   Pulse 82   Temp (!) 97 3 °F (36 3 °C) (Tympanic)   Resp 18   Ht 5' 10" (1 778 m)   Wt 118 kg (259 lb 12 8 oz)   SpO2 97%   BMI 37 28 kg/m²        Physical Exam     Physical Exam   Constitutional: He is oriented to person, place, and time  He appears well-developed and well-nourished  HENT:   Head: Normocephalic  Eyes: Pupils are equal, round, and reactive to light  Conjunctivae and EOM are normal    Neck: Normal range of motion  Cardiovascular: Normal rate, regular rhythm, normal heart sounds and intact distal pulses  Pulmonary/Chest: Effort normal and breath sounds normal            Abdominal: Soft  Bowel sounds are normal    Neurological: He is alert and oriented to person, place, and time  Skin: Skin is warm  Capillary refill takes less than 2 seconds  Psychiatric: He has a normal mood and affect  His behavior is normal  Judgment and thought content normal    Nursing note and vitals reviewed

## 2019-12-13 NOTE — PATIENT INSTRUCTIONS
Follow up with PCP in 2 days FOR DRESSING / PACKING CHANGE   Proceed to  ER if symptoms worsen  Patient will begin Cephalexin and bactrim as directed   Monitor symptoms closely  Motrin / tylenol as needed for pain  Keep area clean and dry at all times    Abscess   WHAT YOU NEED TO KNOW:   A warm compress may help your abscess drain  Your healthcare provider may make a cut in the abscess so it can drain  You may need surgery to remove an abscess that is on your hands or buttocks  DISCHARGE INSTRUCTIONS:   Return to the emergency department if:   · The area around your abscess becomes very painful, warm, or has red streaks  · You have a fever and chills  · Your heart is beating faster than usual      · You feel faint or confused  Contact your healthcare provider if:   · Your abscess gets bigger or does not get better  · Your abscess returns  · You have questions or concerns about your condition or care  Medicines: You may  need any of the following:  · Antibiotics  help treat a bacterial infection  · Acetaminophen  decreases pain and fever  It is available without a doctor's order  Ask how much to take and how often to take it  Follow directions  Acetaminophen can cause liver damage if not taken correctly  · NSAIDs , such as ibuprofen, help decrease swelling, pain, and fever  This medicine is available with or without a doctor's order  NSAIDs can cause stomach bleeding or kidney problems in certain people  If you take blood thinner medicine, always ask your healthcare provider if NSAIDs are safe for you  Always read the medicine label and follow directions  · Take your medicine as directed  Contact your healthcare provider if you think your medicine is not helping or if you have side effects  Tell him or her if you are allergic to any medicine  Keep a list of the medicines, vitamins, and herbs you take  Include the amounts, and when and why you take them   Bring the list or the pill bottles to follow-up visits  Carry your medicine list with you in case of an emergency  Self-care:   · Apply a warm compress to your abscess  This will help it open and drain  Wet a washcloth in warm, but not hot, water  Apply the compress for 10 minutes  Repeat this 4 times each day  Do not  press on an abscess or try to open it with a needle  You may push the bacteria deeper or into your blood  · Do not share your clothes, towels, or sheets with anyone  This can spread the infection to others  · Wash your hands often  This can help prevent the spread of germs  Use soap and water or an alcohol-based hand rub  Care for your wound after it is drained:   · Care for your wound as directed  If your healthcare provider says it is okay, carefully remove the bandage and gauze packing  You may need to soak the gauze to get it out of your wound  Clean your wound and the area around it as directed  Dry the area and put on new, clean bandages  Change your bandages when they get wet or dirty  · Ask your healthcare provider how to change the gauze in your wound  Keep track of how many pieces of gauze are placed inside the wound  Do not put too much packing in the wound  Do not pack the gauze too tightly in your wound  Follow up with your healthcare provider in 1 to 3 days: You may need to have your packing removed or your bandage changed  Write down your questions so you remember to ask them during your visits  © 2017 [employer]  Information is for End User's use only and may not be sold, redistributed or otherwise used for commercial purposes  All illustrations and images included in CareNotes® are the copyrighted property of [employer]  or [employer]  The above information is an  only  It is not intended as medical advice for individual conditions or treatments   Talk to your doctor, nurse or pharmacist before following any medical regimen to see if it is safe and effective for you  Incision and Drainage   WHAT YOU NEED TO KNOW:   Incision and drainage (I & D) is a procedure to drain a pocket of fluid, such as pus or blood  DISCHARGE INSTRUCTIONS:   Medicines: You may need any of the following:  · NSAIDs , such as ibuprofen, help decrease swelling, pain, and fever  This medicine is available with or without a doctor's order  NSAIDs can cause stomach bleeding or kidney problems in certain people  If you take blood thinner medicine, always ask if NSAIDs are safe for you  Always read the medicine label and follow directions  Do not give these medicines to children under 10months of age without direction from your child's healthcare provider  · Prescription pain medication  may be given to decrease pain  Do not wait until the pain is severe before you take your medicine  Your healthcare provider may ask you to take pain medicine ½ hour before your follow-up visit for wound care  · Take your medicine as directed  Contact your healthcare provider if you think your medicine is not helping or if you have side effects  Tell him if you are allergic to any medicine  Keep a list of the medicines, vitamins, and herbs you take  Include the amounts, and when and why you take them  Bring the list or the pill bottles to follow-up visits  Carry your medicine list with you in case of an emergency  Follow up with your healthcare provider in 1 to 3 days, or as directed: You may need to return to have your incision cleaned and your bandages changed  Your healthcare provider will make sure your wound is healing well  Ask how to care for your wound at home  Bring a list of your questions so you remember to ask them during your visits  Wound care:  Keep your wound clean and dry as directed by your healthcare provider:  · Wash your hands  before and after you touch or clean your wound  · Flush or soak your wound  as directed      · Check your wound  for signs of infection, such as redness, swelling, and pain  · Change the packing or bandages  as directed  Ask for more information about what type of bandages to use  Elevate your wound: If your wound is on your arm or leg, keep it raised above the level of your heart as often as you can  This will help decrease swelling and pain  Prop your arm or leg on pillows or blankets to keep it elevated comfortably  Wear a splint as directed: You may need to wear a splint if your wound is on your hand, arm, or leg  A splint limits movement and helps your wound heal  Do not remove the splint until your healthcare provider says it is okay  Contact your healthcare provider if:   · You have fever or chills  · You feel more tired than usual     · Fluid builds up again and creates a pocket in the same area  · Your wound becomes red, swollen, and painful  · You have questions or concerns about your condition or care  Return to the emergency department if:   · You have red streaks or extreme pain near your wound  · Blood soaks through your bandage  · You have pain in your back, stomach, muscles, or joints  © 2017 [facility] Information is for End User's use only and may not be sold, redistributed or otherwise used for commercial purposes  All illustrations and images included in CareNotes® are the copyrighted property of [employer]  or [employer]  The above information is an  only  It is not intended as medical advice for individual conditions or treatments  Talk to your doctor, nurse or pharmacist before following any medical regimen to see if it is safe and effective for you

## 2019-12-14 ENCOUNTER — HOSPITAL ENCOUNTER (EMERGENCY)
Facility: HOSPITAL | Age: 59
Discharge: HOME | End: 2019-12-14
Attending: EMERGENCY MEDICINE
Payer: COMMERCIAL

## 2019-12-14 ENCOUNTER — OFFICE VISIT (OUTPATIENT)
Dept: URGENT CARE | Age: 59
End: 2019-12-14
Payer: COMMERCIAL

## 2019-12-14 ENCOUNTER — TELEPHONE (OUTPATIENT)
Dept: URGENT CARE | Age: 59
End: 2019-12-14

## 2019-12-14 VITALS
WEIGHT: 260 LBS | OXYGEN SATURATION: 97 % | DIASTOLIC BLOOD PRESSURE: 66 MMHG | SYSTOLIC BLOOD PRESSURE: 142 MMHG | HEART RATE: 90 BPM | BODY MASS INDEX: 37.22 KG/M2 | HEIGHT: 70 IN | TEMPERATURE: 99.3 F | RESPIRATION RATE: 18 BRPM

## 2019-12-14 VITALS
HEART RATE: 82 BPM | DIASTOLIC BLOOD PRESSURE: 90 MMHG | RESPIRATION RATE: 16 BRPM | TEMPERATURE: 97.6 F | SYSTOLIC BLOOD PRESSURE: 130 MMHG | OXYGEN SATURATION: 99 %

## 2019-12-14 DIAGNOSIS — Z51.89 WOUND CHECK, ABSCESS: Primary | ICD-10-CM

## 2019-12-14 DIAGNOSIS — G47.30 SLEEP APNEA: ICD-10-CM

## 2019-12-14 DIAGNOSIS — L02.211 ABSCESS OF FLANK: Primary | ICD-10-CM

## 2019-12-14 DIAGNOSIS — E11.9 DM (DIABETES MELLITUS) (HCC): ICD-10-CM

## 2019-12-14 PROCEDURE — G0382 LEV 3 HOSP TYPE B ED VISIT: HCPCS | Performed by: PHYSICIAN ASSISTANT

## 2019-12-14 PROCEDURE — 99282 EMERGENCY DEPT VISIT SF MDM: CPT

## 2019-12-14 PROCEDURE — 99283 EMERGENCY DEPT VISIT LOW MDM: CPT | Performed by: EMERGENCY MEDICINE

## 2019-12-14 NOTE — PROGRESS NOTES
[facility]        NAME: Dutch Clarke is a 60 y o  male  : 1960    MRN: [id number]  DATE: 2019  TIME: 6:50 PM    Assessment and Plan   Abscess of flank [L02 211]  1  Abscess of flank  Transfer to other facility         Patient Instructions     Patient sent to the ED for further Evaluation for cellulitis     Chief Complaint     Chief Complaint   Patient presents with    Wound Check     Wound re-check on back left flank region, reports no drainage or discharge, no fevers present  History of Present Illness       Patient is a 9year-old male presenting the office status post I and D to the right flank region  Patient states that his wound is doing well overall  Patient denies any discharge from the site  Patient denies any fevers any chills any nausea vomiting diarrhea  Patient has been taking his antibiotics with good results  Patient has any shortness of breath chest pain  Patient offers no other complaints at this time  Wound Check   He was originally treated 2 to 3 days ago  Previous treatment included I&D of abscess and oral antibiotics  The maximum temperature noted was less than 100 4 F  The temperature was taken using an axillary reading  There has been no drainage from the wound  Review of Systems   Review of Systems   Constitutional: Negative  HENT: Negative  Eyes: Negative  Respiratory: Negative  Cardiovascular: Negative  Gastrointestinal: Negative  Endocrine: Negative  Genitourinary: Negative  Musculoskeletal: Negative  Skin: Positive for rash and wound  Negative for color change and pallor  Allergic/Immunologic: Negative  Neurological: Negative  Hematological: Negative  Psychiatric/Behavioral: Negative            Current Medications       Current Outpatient Medications:     cephalexin (KEFLEX) 500 mg capsule, Take 1 capsule (500 mg total) by mouth every 6 (six) hours for 7 days, Disp: 28 capsule, Rfl: 0   sulfamethoxazole-trimethoprim (BACTRIM DS) 800-160 mg per tablet, Take 1 tablet by mouth every 12 (twelve) hours for 7 days, Disp: 14 tablet, Rfl: 0    fluticasone (FLONASE) 50 mcg/act nasal spray, 2 sprays into each nostril daily as needed , Disp: , Rfl:     gabapentin (NEURONTIN) 100 mg capsule, Take 100 mg twice daily  If tolerating but still painful, increase to 100 mg during the day and 200 mg at bedtime  (Patient not taking: Reported on 12/12/2019), Disp: 60 capsule, Rfl: 0    glipiZIDE (GLUCOTROL XL) 10 mg 24 hr tablet, Take 1 tablet (10 mg total) by mouth daily, Disp: 90 tablet, Rfl: 0    metFORMIN (GLUCOPHAGE) 1000 MG tablet, TAKE 1 TABLET BY MOUTH TWICE DAILY, Disp: 180 tablet, Rfl: 1    Multiple Vitamin (MULTI-DAY) TABS, Take by mouth daily, Disp: , Rfl:     sitaGLIPtin (JANUVIA) 100 mg tablet, Take 1 tablet (100 mg total) by mouth daily, Disp: 90 tablet, Rfl: 0    Current Allergies     Allergies as of 12/14/2019    (No Known Allergies)            The following portions of the patient's history were reviewed and updated as appropriate: allergies, current medications, past family history, past medical history, past social history, past surgical history and problem list      Past Medical History:   Diagnosis Date    Diabetes mellitus ([address] )        Past Surgical History:   Procedure Laterality Date    EAR SURGERY Right 12/2014    Basal cancer removal    GASTRIC BYPASS  2015       Family History   Problem Relation Age of Onset    Diabetes Family     Hypertension Family     Heart attack Family          Medications have been verified  Objective   /66 (BP Location: Right arm)   Pulse 90   Temp 99 3 °F (37 4 °C) (Tympanic)   Resp 18   Ht 5' 10" (1 778 m)   Wt 118 kg (260 lb)   SpO2 97%   BMI 37 31 kg/m²        Physical Exam     Physical Exam   Constitutional: He is oriented to person, place, and time  He appears well-developed and well-nourished  HENT:   Head: Normocephalic  Eyes: Pupils are equal, round, and reactive to light  Conjunctivae and EOM are normal    Neck: Normal range of motion  Cardiovascular: Normal rate, regular rhythm, normal heart sounds and intact distal pulses  Pulmonary/Chest: Effort normal and breath sounds normal    Musculoskeletal:        Back:    Neurological: He is alert and oriented to person, place, and time  Skin: Skin is warm  Capillary refill takes less than 2 seconds  Psychiatric: He has a normal mood and affect  His behavior is normal  Judgment and thought content normal    Nursing note and vitals reviewed

## 2019-12-15 LAB
BACTERIA WND AEROBE CULT: ABNORMAL
GRAM STN SPEC: ABNORMAL
GRAM STN SPEC: ABNORMAL

## 2019-12-15 NOTE — ED PROVIDER NOTES
History  Chief Complaint   Patient presents with    Wound Check     had abcess I&D Thursday Night went back to urgent care today and they feel it looks red and was told to come to er for check     62yo M presents for wound check  Over past month pt had large pimple of increasing size on right flank/torso below scapula  Was itchy, irritated, eventually became tender over past week  Wife describes fluctuance that evolving induration over past week  Went to urgent care 2 days ago, had I&D, packing, was placed on Keflex and Bactrim  Was told to return 2 days later for wound check  Advised to come to the ED  Denies purulent drainage, pain  Patient feels well, has no complaints  Denies chest pain, abdominal pain, shortness of breath, fever, chills, nausea, vomiting, diarrhea  Eating and drinking well  PMH HTN, sleep apnea on CPAP  Prior to Admission Medications   Prescriptions Last Dose Informant Patient Reported? Taking? Multiple Vitamin (MULTI-DAY) TABS 2019 at Unknown time  Yes Yes   Sig: Take by mouth daily   cephalexin (KEFLEX) 500 mg capsule 2019 at Unknown time  No Yes   Sig: Take 1 capsule (500 mg total) by mouth every 6 (six) hours for 7 days   fluticasone (FLONASE) 50 mcg/act nasal spray Not Taking at Unknown time  Yes No   Si sprays into each nostril daily as needed    gabapentin (NEURONTIN) 100 mg capsule Not Taking at Unknown time  No No   Sig: Take 100 mg twice daily  If tolerating but still painful, increase to 100 mg during the day and 200 mg at bedtime     Patient not taking: Reported on 2019   glipiZIDE (GLUCOTROL XL) 10 mg 24 hr tablet 2019 at Unknown time  No Yes   Sig: Take 1 tablet (10 mg total) by mouth daily   metFORMIN (GLUCOPHAGE) 1000 MG tablet 2019 at Unknown time  No Yes   Sig: TAKE 1 TABLET BY MOUTH TWICE DAILY   sitaGLIPtin (JANUVIA) 100 mg tablet 2019 at Unknown time  No Yes   Sig: Take 1 tablet (100 mg total) by mouth daily sulfamethoxazole-trimethoprim (BACTRIM DS) 800-160 mg per tablet 2019 at Unknown time  No Yes   Sig: Take 1 tablet by mouth every 12 (twelve) hours for 7 days      Facility-Administered Medications: None       Past Medical History:   Diagnosis Date    Diabetes mellitus ([address] )        Past Surgical History:   Procedure Laterality Date    EAR SURGERY Right 2014    Basal cancer removal    GASTRIC BYPASS         Family History   Problem Relation Age of Onset    Diabetes Family     Hypertension Family     Heart attack Family      I have reviewed and agree with the history as documented  Social History     Tobacco Use    Smoking status: Former Smoker     Last attempt to quit: 1977     Years since quittin 9    Smokeless tobacco: Never Used   Substance Use Topics    Alcohol use: Yes     Comment: Social     Drug use: No        Review of Systems   Constitutional: Negative for chills and fever  HENT: Negative for ear pain, sinus pain and sore throat  Eyes: Negative for pain  Respiratory: Negative for shortness of breath  Cardiovascular: Negative for chest pain  Gastrointestinal: Negative for abdominal pain, diarrhea, nausea and vomiting  Genitourinary: Negative for difficulty urinating, dysuria and flank pain  Musculoskeletal: Negative for back pain and neck pain  Skin:        Minimal clear/pinkish drainage   Neurological: Negative for headaches  Psychiatric/Behavioral: Negative for confusion  All other systems reviewed and are negative        Physical Exam  ED Triage Vitals [19]   Temperature Pulse Respirations Blood Pressure SpO2   97 6 °F (36 4 °C) 82 16 130/90 99 %      Temp src Heart Rate Source Patient Position - Orthostatic VS BP Location FiO2 (%)   -- -- -- Left arm --      Pain Score       --             Orthostatic Vital Signs          Vitals:    19   BP: 130/90   Pulse: 82       Physical Exam   Constitutional: He appears well-developed and well-nourished  No distress  HENT:   Head: Normocephalic  Nose: Nose normal    Mouth/Throat: Oropharynx is clear and moist    Eyes: Conjunctivae and EOM are normal  Right eye exhibits no discharge  Left eye exhibits no discharge  No scleral icterus  Neck: Normal range of motion  Cardiovascular: Normal rate and normal heart sounds  Pulmonary/Chest: Effort normal  No stridor  No respiratory distress  Abdominal: Soft  He exhibits no distension  There is no tenderness  There is no rebound and no guarding  Neurological: He is alert  No cranial nerve deficit  Skin: Skin is warm and dry  Capillary refill takes less than 2 seconds  He is not diaphoretic  4x4cm area of inflammation, erythema, nonTTP, non indurated, serosanguinous drainage on removal of packing strip  Psychiatric: He has a normal mood and affect  His behavior is normal    Nursing note and vitals reviewed  ED Medications  Medications - No data to display    Diagnostic Studies  Results Reviewed     None                 No orders to display         Procedures  Procedures      ED Course                               MDM  Number of Diagnoses or Management Options  DM (diabetes mellitus) ([facility] ):   Sleep apnea: Wound check, abscess:   Diagnosis management comments: Return precautions, continue Abx  Advised to wash with soapy water, can soak in bath daily  Loose bandaging as needed to prevent soiling of clothing           Disposition  Final diagnoses:   Wound check, abscess   DM (diabetes mellitus) ([facility] )   Sleep apnea     Time reflects when diagnosis was documented in both MDM as applicable and the Disposition within this note     Time User Action Codes Description Comment    12/14/2019  8:38 PM Oscar Garces Add [Z51 89] Visit for wound check     12/14/2019  8:38 PM Ghazala KULKARNI Add [Z51 89] Wound check, abscess     12/14/2019  8:38 PM Oscar Garces Modify [Z51 89] Wound check, abscess     12/14/2019  8:38 PM Oscar Garces Remove [Z51 89] Visit for wound check     12/14/2019  8:38 PM Cooper KULKARNI Add [E11 9] DM (diabetes mellitus) ([facility] )     12/14/2019  8:38 PM Devin Manifold Add [G47 30] Sleep apnea       ED Disposition     ED Disposition Condition Date/Time Comment    Discharge Stable Sat Dec 14, 2019  8:38 PM Tonia Krishnan discharge to home/self care              Follow-up Information     Follow up With Specialties Details Why Contact Info Additional Information    Abner Kaplan PA-C Family Medicine, Physician Assistant Schedule an appointment as soon as possible for a visit  For follow up regarding your symptoms, As needed [address]  [address] [place] [address] [address] Emergency Department Emergency Medicine Go to  If symptoms worsen [address] [facility] ED, [address], [address]          Discharge Medication List as of 12/14/2019  8:41 PM      CONTINUE these medications which have NOT CHANGED    Details   cephalexin (KEFLEX) 500 mg capsule Take 1 capsule (500 mg total) by mouth every 6 (six) hours for 7 days, Starting Thu 12/12/2019, Until Thu 12/19/2019, Normal      glipiZIDE (GLUCOTROL XL) 10 mg 24 hr tablet Take 1 tablet (10 mg total) by mouth daily, Starting Fri 12/6/2019, Normal      metFORMIN (GLUCOPHAGE) 1000 MG tablet TAKE 1 TABLET BY MOUTH TWICE DAILY, Normal      Multiple Vitamin (MULTI-DAY) TABS Take by mouth daily, Historical Med      sitaGLIPtin (JANUVIA) 100 mg tablet Take 1 tablet (100 mg total) by mouth daily, Starting Thu 10/10/2019, Normal      sulfamethoxazole-trimethoprim (BACTRIM DS) 800-160 mg per tablet Take 1 tablet by mouth every 12 (twelve) hours for 7 days, Starting Thu 12/12/2019, Until Thu 12/19/2019, Normal      fluticasone (FLONASE) 50 mcg/act nasal spray 2 sprays into each nostril daily as needed , Starting Fri 9/8/2017, Historical Med      gabapentin (NEURONTIN) 100 mg capsule Take 100 mg twice daily  If tolerating but still painful, increase to 100 mg during the day and 200 mg at bedtime , Normal           No discharge procedures on file  ED Provider  Attending physically available and evaluated [place] Tamera CHUNG managed the patient along with the ED Attending      Electronically Signed by         Tosha Perdomo MD  12/15/19 [id number]

## 2019-12-15 NOTE — ED ATTENDING ATTESTATION
12/14/2019  Tarun CHUNG MD, saw and evaluated the patient  I have discussed the patient with the resident/non-physician practitioner and agree with the resident's/non-physician practitioner's findings, Plan of Care, and MDM as documented in the resident's/non-physician practitioner's note, except where noted  All available labs and Radiology studies were reviewed  I was present for key portions of any procedure(s) performed by the resident/non-physician practitioner and I was immediately available to provide assistance  At this point I agree with the current assessment done in the Emergency Department  I have conducted an independent evaluation of this patient a history and physical is as follows:    ED Course      Emergency Department Note- Tre Tyler 60 y o  male MRN: [id number]    Unit/Bed#: ED 15 Encounter: [id number]    Tre Tyler is a 60 y o  male who presents with   Chief Complaint   Patient presents with    Wound Check     had abcess I&D Thursday Night went back to urgent care today and they feel it looks red and was told to come to er for check         History of Present Illness   HPI:  Tre Tyler is a 60 y o  male who presents for evaluation of:  Drainage of abscess right posterior thorax done on Thursday night  The patient notes that the pain in the area of the abscess is much improved since drainage of the abscess on Thursday night  The patient denies any associated fevers and chills  The patient denies drainage or malodorous smell from the abscess site  Review of Systems   Constitutional: Negative for fatigue and fever  HENT: Negative for congestion and dental problem  Cardiovascular: Negative for chest pain and palpitations  Musculoskeletal: Negative for back pain and neck pain  All other systems reviewed and are negative        Historical Information   Past Medical History:   Diagnosis Date    Diabetes mellitus ([address] )      Past Surgical History:   Procedure Laterality Date    EAR SURGERY Right 2014    Basal cancer removal    GASTRIC BYPASS  2015     Social History   Social History     Substance and Sexual Activity   Alcohol Use Yes    Comment: Social      Social History     Substance and Sexual Activity   Drug Use No     Social History     Tobacco Use   Smoking Status Former Smoker    Last attempt to quit: 1977    Years since quittin 9   Smokeless Tobacco Never Used     Family History: non-contributory    Meds/Allergies   all medications and allergies reviewed  No Known Allergies    Objective   First Vitals:   Blood Pressure: 130/90 (19)  Pulse: 82 (19)  Temperature: 97 6 °F (36 4 °C) (19)  Respirations: 16 (19)  SpO2: 99 % (19)    Current Vitals:   Blood Pressure: 130/90 (19)  Pulse: 82 (19)  Temperature: 97 6 °F (36 4 °C) (19)  Respirations: 16 (19)  SpO2: 99 % (19)    No intake or output data in the 24 hours ending 19    Invasive Devices     None                 Physical Exam   Constitutional: He is oriented to person, place, and time  He appears well-developed and well-nourished  HENT:   Head: Normocephalic and atraumatic  Pulmonary/Chest: Effort normal and breath sounds normal    Abdominal: Soft  Bowel sounds are normal    Musculoskeletal: Normal range of motion  He exhibits no deformity  Neurological: He is alert and oriented to person, place, and time  Skin: Skin is warm and dry  Capillary refill takes less than 2 seconds  Psychiatric: He has a normal mood and affect  His behavior is normal  Judgment and thought content normal    Nursing note and vitals reviewed  Small amount of serous sanguinous drainage from abscess site after removal of the packing  No pus drainage  Male in no distress awake and alert  Medical Decision Makin  Resolving right posterior thoracic abscess:  Plan the place new bandage;  I recommended warm soaks; the patient will continue on oral antibiotics as prescribed  No results found for this or any previous visit (from the past 36 hour(s))  No orders to display         Portions of the record may have been created with voice recognition software  Occasional wrong word or "sound a like" substitutions may have occurred due to the inherent limitations of voice recognition software  Read the chart carefully and recognize, using context, where substitutions have occurred            Critical Care Time  Procedures

## 2019-12-31 DIAGNOSIS — E11.65 POORLY CONTROLLED TYPE 2 DIABETES MELLITUS (HCC): ICD-10-CM

## 2019-12-31 RX ORDER — SITAGLIPTIN 100 MG/1
TABLET, FILM COATED ORAL
Qty: 90 TABLET | Refills: 0 | Status: SHIPPED | OUTPATIENT
Start: 2019-12-31 | End: 2020-03-26

## 2019-12-31 NOTE — TELEPHONE ENCOUNTER
Patient is overdue for a diabetes follow-up (needs to be seen at least every 6 months)  Please call him to schedule this

## 2020-01-03 DIAGNOSIS — E11.65 POORLY CONTROLLED DIABETES MELLITUS (HCC): ICD-10-CM

## 2020-02-05 NOTE — PROGRESS NOTES
FAMILY PRACTICE OFFICE VISIT  [facility] - [facility]       NAME: Faustino De La Cruz  AGE: 60 y o  SEX: male       : 1960        MRN: [id number]    DATE: 2020  TIME: 3:04 PM    Assessment and Plan     Problem List Items Addressed This Visit        Endocrine    Type 2 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, without long-term current use of insulin ([address] )     We reviewed that his diabetic control has worsened  His A1c is up from 7 2% to 8 4%  He was encouraged to work on decreasing his intake of carbohydrates  He declined referral to a dietitian  He states that he would like to try juicing and is doing a ratio of 80% vegetable to 20% fruit in his juicing  He was encouraged to continue with the metformin 1000 mg twice daily, Januvia 100 mg daily, and glipizide XL 10 mg daily  Jardiance 10 mg daily was added to his regimen  Will return in 3 months for recheck  He should call with any problems or concerns in the interim  He was given slip for labs to do prior to that visit  His foot exam was updated today  He was given paperwork to bring with him to see the ophthalmologist and discussed the importance of seeing them on a yearly basis  Lab Results   Component Value Date    HGBA1C 8 4 (A) 2020            Relevant Medications    Empagliflozin 10 MG TABS    Other Relevant Orders    CBC and differential    Comprehensive metabolic panel    Lipid Panel with Direct LDL reflex    TSH, 3rd generation with Free T4 reflex    Microalbumin / creatinine urine ratio       Respiratory    Sleep apnea     Patient will continue with consistent CPAP use  He was given a script for a new CPAP mask per his request          Relevant Orders    Cpap New DME       Other    Hyperlipidemia     We reviewed that his labs showed LDL of 97 last year  This is above goal   He was encouraged to repeat lipid panel prior to next visit    We reviewed that a cholesterol medication should be initiated if his LDL is over 70  Relevant Orders    Lipid Panel with Direct LDL reflex    History of hypertension     Blood pressure normal since having gastric bypass surgery  We reviewed that it was borderline today  He was encouraged to work on weight loss to prevent recurrence of his hypertension  We will reassess at next visit  Obesity (BMI 30-39  9)     Patient was encouraged to limit his carbohydrate intake and to exercise regularly  Will hold off on phentermine for now given borderline blood pressure reading  We will reassess his progress at next visit with choosing  Possibly consider Belviq if additional help required at next visit  Relevant Orders    CBC and differential    Comprehensive metabolic panel    Lipid Panel with Direct LDL reflex    TSH, 3rd generation with Free T4 reflex      Other Visit Diagnoses     Poorly controlled type 2 diabetes mellitus (HCC)    -  Primary    Relevant Medications    Empagliflozin 10 MG TABS    Other Relevant Orders    POCT hemoglobin A1c (Completed)    Prostate cancer screening        Relevant Orders    PSA, Total Screen      The USPSTF recommendation for HIV screening in all patients between 13and 72years old (once in lifetime or annually with risk factors) was discussed with the patient  The patient agreed to testing  Type 2 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, without long-term current use of insulin (HCC)  We reviewed that his diabetic control has worsened  His A1c is up from 7 2% to 8 4%  He was encouraged to work on decreasing his intake of carbohydrates  He declined referral to a dietitian  He states that he would like to try juicing and is doing a ratio of 80% vegetable to 20% fruit in his juicing  He was encouraged to continue with the metformin 1000 mg twice daily, Januvia 100 mg daily, and glipizide XL 10 mg daily    Jardiance 10 mg daily was added to his regimen  Will return in 3 months for recheck  He should call with any problems or concerns in the interim  He was given slip for labs to do prior to that visit  His foot exam was updated today  He was given paperwork to bring with him to see the ophthalmologist and discussed the importance of seeing them on a yearly basis  Lab Results   Component Value Date    HGBA1C 8 4 (A) 02/07/2020       Sleep apnea  Patient will continue with consistent CPAP use  He was given a script for a new CPAP mask per his request     History of hypertension  Blood pressure normal since having gastric bypass surgery  We reviewed that it was borderline today  He was encouraged to work on weight loss to prevent recurrence of his hypertension  We will reassess at next visit  Hyperlipidemia  We reviewed that his labs showed LDL of 97 last year  This is above goal   He was encouraged to repeat lipid panel prior to next visit  We reviewed that a cholesterol medication should be initiated if his LDL is over 70  Obesity (BMI 30-39  9)  Patient was encouraged to limit his carbohydrate intake and to exercise regularly  Will hold off on phentermine for now given borderline blood pressure reading  We will reassess his progress at next visit with choosing  Possibly consider Belviq if additional help required at next visit  BMI Counseling: Body mass index is 36 13 kg/m²  The BMI is above normal  Nutrition recommendations include moderation in carbohydrate intake  Exercise recommendations include exercising 3-5 times per week  No pharmacotherapy was ordered  Chief Complaint     Chief Complaint   Patient presents with    Diabetes       History of Present Illness   Daren Stark is a 60y o -year-old male who presents for follow-up on chronic medical problems  The patient presents today for follow-up on diabetes  At the last visit, A1c was 7 2% on 06/13/2019    The patient reports that current diabetic medications include glipizide 10 mg XL daily, Januvia 100 mg daily, and metformin 1000 mg twice daily  Fasting blood sugars in the morning are approximately 140-180  Postprandial glucoses readings are not checked  The patient denies hypoglycemic episodes  Last eye exam was 12/2017  Last foot exam was 1/18/19  Today's A1c in the office is 8 4%  The patient reports that recently his mood has been okay  He notes that his mother passed recently but is feeling okay  Today's PHQ2 score was 0  The patient feels that sleep apnea has been controlled  Regular CPAP use is confirmed  The patient denies excessive daytime sleepiness, known apneas, or nonrestorative sleep  The patient is not on a statin for his cholesterol  Last LDL was 97 in 1/2019  Since the patent's last visit, weight has increased 9 pounds since the about 7 months ago  Diet is reported to be poor - states that it is hard with  to eat well  He denies regular exercise  He notes that he has chronic pain in the left upper arm  He was unable to get MRI before due to lack of insurance coverage last year  He was seeing [facility] and will go back to them  Patient notes that he has had pain in the bottom of the right foot for the last few days - is worst with stepping down out of bed and then improves with walking        Review of Systems   Review of Systems   Constitutional: Negative for chills, fatigue and fever  Respiratory: Negative for shortness of breath  Cardiovascular: Negative for chest pain, palpitations and leg swelling  Musculoskeletal:        Right foot pain as in HPI   Neurological: Negative for dizziness and headaches  Psychiatric/Behavioral: Negative for dysphoric mood and sleep disturbance (good with CPAP)  The patient is not nervous/anxious          Active Problem List     Patient Active Problem List   Diagnosis    Asthma    Basal cell carcinoma of skin    Environmental and seasonal allergies    Hyperlipidemia    History of hypertension    Obesity (BMI 30-39  9)    Onychomycosis of toenail    Pannus, abdominal    Type 2 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, without long-term current use of insulin (HCC)    Sciatica of left side    Sleep apnea    Bilateral impacted cerumen         Past Medical History:  Past Medical History:   Diagnosis Date    Diabetes mellitus ([facility] [address] )        Past Surgical History:  Past Surgical History:   Procedure Laterality Date    EAR SURGERY Right 2014    Basal cancer removal    GASTRIC BYPASS         Family History:  Family History   Problem Relation Age of Onset    Diabetes Family     Hypertension Family     Heart attack Family        Social History:  Social History     Socioeconomic History    Marital status: /Civil Union     Spouse name: Not on file    Number of children: Not on file    Years of education: Not on file    Highest education level: Not on file   Occupational History    Not on file   Social Needs    Financial resource strain: Not on file    Food insecurity:     Worry: Not on file     Inability: Not on file    Transportation needs:     Medical: Not on file     Non-medical: Not on file   Tobacco Use    Smoking status: Former Smoker     Last attempt to quit: 1977     Years since quittin 1    Smokeless tobacco: Never Used   Substance and Sexual Activity    Alcohol use: Yes     Comment: Social     Drug use: No    Sexual activity: Not on file   Lifestyle    Physical activity:     Days per week: Not on file     Minutes per session: Not on file    Stress: Not on file   Relationships    Social connections:     Talks on phone: Not on file     Gets together: Not on file     Attends Hinduism service: Not on file     Active member of club or organization: Not on file     Attends meetings of clubs or organizations: Not on file     Relationship status: Not on file    Intimate partner violence: Fear of current or ex partner: Not on file     Emotionally abused: Not on file     Physically abused: Not on file     Forced sexual activity: Not on file   Other Topics Concern    Not on file   Social History Narrative    Not on file       Objective     Vitals:    02/07/20 1306 02/07/20 1356   BP: 122/80 136/88   BP Location:  Left arm   Patient Position:  Sitting   Cuff Size:  Standard   Pulse: 76    Resp: 18    SpO2: 98%    Weight: 114 kg (251 lb 12 8 oz)    Height: 5' 10" (1 778 m)      Wt Readings from Last 3 Encounters:   02/07/20 114 kg (251 lb 12 8 oz)   12/14/19 118 kg (260 lb)   12/12/19 118 kg (259 lb 12 8 oz)       Physical Exam   Constitutional: He appears well-developed and well-nourished  No distress  Obese    HENT:   Head: Normocephalic and atraumatic  Neck: Neck supple  No thyromegaly present  Cardiovascular: Normal rate, regular rhythm, normal heart sounds and intact distal pulses  No murmur heard  No carotid bruits noted   Pulmonary/Chest: Effort normal and breath sounds normal  He has no wheezes  He has no rales  Musculoskeletal: He exhibits tenderness (over the arch of the right foot)  He exhibits no edema  Lymphadenopathy:     He has no cervical adenopathy  Neurological: He is alert  Skin: Skin is warm and dry  Psychiatric: He has a normal mood and affect  Vitals reviewed        Pertinent Laboratory/Diagnostic Studies:  Lab Results   Component Value Date    BUN 11 01/18/2019    CREATININE 0 87 01/18/2019    CALCIUM 9 6 01/18/2019     11/18/2017    K 4 6 01/18/2019    CO2 31 01/18/2019     01/18/2019     Lab Results   Component Value Date    ALT 13 01/18/2019    AST 17 01/18/2019    ALKPHOS 63 01/18/2019    BILITOT 0 7 11/18/2017       Lab Results   Component Value Date    WBC 7 6 01/18/2019    HGB 14 9 01/18/2019    HCT 43 2 01/18/2019    MCV 87 3 01/18/2019     01/18/2019     Lab Results   Component Value Date    CHOL 133 11/18/2017     Lab Results Component Value Date    TRIG 110 01/18/2019     Lab Results   Component Value Date    HDL 39 (L) 01/18/2019     Lab Results   Component Value Date    LDLCALC 69 03/08/2016     Lab Results   Component Value Date    HGBA1C 8 4 (A) 02/07/2020       Results for orders placed or performed in visit on 02/07/20   POCT hemoglobin A1c   Result Value Ref Range    Hemoglobin A1C 8 4 (A) 6 5         ALLERGIES:  No Known Allergies    Current Medications     Current Outpatient Medications   Medication Sig Dispense Refill    glipiZIDE (GLUCOTROL XL) 10 mg 24 hr tablet Take 1 tablet (10 mg total) by mouth daily 90 tablet 0    JANUVIA 100 MG tablet TAKE 1 TABLET BY MOUTH EVERY DAY 90 tablet 0    metFORMIN (GLUCOPHAGE) 1000 MG tablet TAKE 1 TABLET BY MOUTH TWICE DAILY 180 tablet 0    Multiple Vitamin (MULTI-DAY) TABS Take by mouth daily      Empagliflozin 10 MG TABS Take 1 tablet (10 mg total) by mouth every morning 30 tablet 5    fluticasone (FLONASE) 50 mcg/act nasal spray 2 sprays into each nostril daily as needed        No current facility-administered medications for this visit            Health Maintenance     Health Maintenance   Topic Date Due    DTaP,Tdap,and Td Vaccines (1 - Tdap) 05/22/1970    BMI: Followup Plan  05/22/1977    Annual Physical  05/22/1977    Hepatitis B Vaccine (1 of 3 - Risk 3-dose series) 05/22/1978    DM Eye Exam  12/30/2018    HEMOGLOBIN A1C  09/13/2019    Diabetic Foot Exam  01/18/2020    URINE MICROALBUMIN  01/18/2020    CRC Screening: Colonoscopy  03/24/2020    HIV Screening  03/07/2020 (Originally 5/22/1974)    Depression Screening PHQ  02/07/2021    BMI: Adult  02/07/2021    Pneumococcal Vaccine: 65+ Years (1 of 2 - PCV13) 05/22/2024    Hepatitis C Screening  Completed    Pneumococcal Vaccine: Pediatrics (0 to 5 Years) and At-Risk Patients (6 to 59 Years)  Completed    Influenza Vaccine  Completed    HIB Vaccine  Aged Out    IPV Vaccine  Aged Out    Hepatitis A Vaccine Aged Out    Meningococcal ACWY Vaccine  Aged Out    HPV Vaccine  Aged Out     Immunization History   Administered Date(s) Administered    Influenza Quadrivalent Preservative Free 3 years and older IM 10/03/2014, 11/25/2016, 12/13/2017    Influenza TIV (IM) 01/30/2013, 11/29/2013    Influenza, recombinant, quadrivalent,injectable, preservative free 01/18/2019, 09/16/2019    Pneumococcal Polysaccharide PPV23 01/18/2019       Drake Plaza PA-C  2/7/2020 3:04 PM  [place] [address] [facility]

## 2020-02-07 ENCOUNTER — OFFICE VISIT (OUTPATIENT)
Dept: FAMILY MEDICINE CLINIC | Facility: CLINIC | Age: 60
End: 2020-02-07
Payer: COMMERCIAL

## 2020-02-07 VITALS
HEART RATE: 76 BPM | DIASTOLIC BLOOD PRESSURE: 88 MMHG | HEIGHT: 70 IN | RESPIRATION RATE: 18 BRPM | WEIGHT: 251.8 LBS | SYSTOLIC BLOOD PRESSURE: 136 MMHG | OXYGEN SATURATION: 98 % | BODY MASS INDEX: 36.05 KG/M2

## 2020-02-07 DIAGNOSIS — Z86.79 HISTORY OF HYPERTENSION: ICD-10-CM

## 2020-02-07 DIAGNOSIS — E11.65 POORLY CONTROLLED TYPE 2 DIABETES MELLITUS (HCC): Primary | ICD-10-CM

## 2020-02-07 DIAGNOSIS — E11.3293 TYPE 2 DIABETES MELLITUS WITH MILD NONPROLIFERATIVE RETINOPATHY OF BOTH EYES, WITHOUT LONG-TERM CURRENT USE OF INSULIN, MACULAR EDEMA PRESENCE UNSPECIFIED (HCC): ICD-10-CM

## 2020-02-07 DIAGNOSIS — G47.30 SLEEP APNEA, UNSPECIFIED TYPE: ICD-10-CM

## 2020-02-07 DIAGNOSIS — Z12.5 PROSTATE CANCER SCREENING: ICD-10-CM

## 2020-02-07 DIAGNOSIS — I10 HYPERTENSION, UNSPECIFIED TYPE: ICD-10-CM

## 2020-02-07 DIAGNOSIS — E66.9 OBESITY (BMI 30-39.9): ICD-10-CM

## 2020-02-07 DIAGNOSIS — E78.5 HYPERLIPIDEMIA, UNSPECIFIED HYPERLIPIDEMIA TYPE: ICD-10-CM

## 2020-02-07 LAB — SL AMB POCT HEMOGLOBIN AIC: 8.4 (ref ?–6.5)

## 2020-02-07 PROCEDURE — 3008F BODY MASS INDEX DOCD: CPT | Performed by: PHYSICIAN ASSISTANT

## 2020-02-07 PROCEDURE — 83036 HEMOGLOBIN GLYCOSYLATED A1C: CPT | Performed by: PHYSICIAN ASSISTANT

## 2020-02-07 PROCEDURE — 99214 OFFICE O/P EST MOD 30 MIN: CPT | Performed by: PHYSICIAN ASSISTANT

## 2020-02-07 PROCEDURE — 1036F TOBACCO NON-USER: CPT | Performed by: PHYSICIAN ASSISTANT

## 2020-02-07 PROCEDURE — 3052F HG A1C>EQUAL 8.0%<EQUAL 9.0%: CPT | Performed by: PHYSICIAN ASSISTANT

## 2020-02-07 PROCEDURE — 3075F SYST BP GE 130 - 139MM HG: CPT | Performed by: PHYSICIAN ASSISTANT

## 2020-02-07 PROCEDURE — 3079F DIAST BP 80-89 MM HG: CPT | Performed by: PHYSICIAN ASSISTANT

## 2020-02-07 NOTE — ASSESSMENT & PLAN NOTE
Patient will continue with consistent CPAP use    He was given a script for a new CPAP mask per his request

## 2020-02-07 NOTE — ASSESSMENT & PLAN NOTE
Patient was encouraged to limit his carbohydrate intake and to exercise regularly  Will hold off on phentermine for now given borderline blood pressure reading  We will reassess his progress at next visit with choosing  Possibly consider Belviq if additional help required at next visit

## 2020-02-07 NOTE — PROGRESS NOTES
Diabetic Foot Exam    Patient's shoes and socks removed  Right Foot/Ankle   Right Foot Inspection  Skin Exam: dry skin                            Sensory       Monofilament testing: intact  Vascular    The right DP pulse is 2+  The right PT pulse is 2+  Left Foot/Ankle  Left Foot Inspection  Skin Exam: dry skin                                         Sensory       Monofilament: intact  Vascular    The left DP pulse is 2+  The left PT pulse is 2+  Assign Risk Category:  No deformity present; No loss of protective sensation;  No weak pulses       Risk: 0

## 2020-02-07 NOTE — ASSESSMENT & PLAN NOTE
Blood pressure normal since having gastric bypass surgery  We reviewed that it was borderline today  He was encouraged to work on weight loss to prevent recurrence of his hypertension  We will reassess at next visit

## 2020-02-07 NOTE — ASSESSMENT & PLAN NOTE
We reviewed that his labs showed LDL of 97 last year  This is above goal   He was encouraged to repeat lipid panel prior to next visit  We reviewed that a cholesterol medication should be initiated if his LDL is over 70

## 2020-02-07 NOTE — ASSESSMENT & PLAN NOTE
We reviewed that his diabetic control has worsened  His A1c is up from 7 2% to 8 4%  He was encouraged to work on decreasing his intake of carbohydrates  He declined referral to a dietitian  He states that he would like to try juicing and is doing a ratio of 80% vegetable to 20% fruit in his juicing  He was encouraged to continue with the metformin 1000 mg twice daily, Januvia 100 mg daily, and glipizide XL 10 mg daily  Jardiance 10 mg daily was added to his regimen  Will return in 3 months for recheck  He should call with any problems or concerns in the interim  He was given slip for labs to do prior to that visit  His foot exam was updated today  He was given paperwork to bring with him to see the ophthalmologist and discussed the importance of seeing them on a yearly basis    Lab Results   Component Value Date    HGBA1C 8 4 (A) 02/07/2020

## 2020-02-07 NOTE — PATIENT INSTRUCTIONS
Plantar Fasciitis Exercises   WHAT YOU NEED TO KNOW:   Plantar fasciitis exercises help stretch your plantar fascia, calf muscles, and Achilles tendon  They also help strengthen the muscles that support your heel and foot  Exercises and stretching can help prevent plantar fasciitis from getting worse or coming back  DISCHARGE INSTRUCTIONS:   Contact your healthcare provider if:   · Your pain and swelling increase  · You develop new knee, hip, or back pain  · You have questions or concerns about your condition or care  How to do plantar fasciitis exercises:  Ask your healthcare provider when to start these exercises and how often to do them  · Heel stretch:  Stand up straight with your hands on a wall  Place your injured leg slightly behind your other leg  Keep your heels flat on the floor, lean forward, and bend both knees  Hold for 30 seconds  · Calf stretch:  Stand up straight with your hands on a wall  Step forward so that your uninjured foot is in front of your injured foot  Keep your front leg bent and your back leg straight  Gently lean forward until you feel your calf stretch  Hold for 30 seconds and then relax  · Seated plantar fascia stretch:  Sit on a firm surface, such as the floor or a mat  Extend your legs out in front of you  Raise your injured foot a few inches off the ground  Keep your leg straight  Grab the toes of your injured foot and pull them toward you  With your other hand, feel your plantar fascia  You should feel it press outward  Hold for 30 seconds  If you cannot reach your toes, loop a towel or tie around your foot  Gently pull on the towel or tie and flex your toes toward you  · Heel raises:  Stand on the injured leg  Raise your other leg off the ground  Hold onto a railing or wall for balance  Slowly rise up on the toes of your injured leg  Hold for 5 seconds  Slowly lower your heel to the ground             · Toe curls:  Place a towel on the floor  Put your foot flat on the towel  Grab the towel with your toes by curling them around the towel  Lift the towel up with your toes  Follow up with your healthcare provider as directed:  Write down your questions so you remember to ask them during your visits  © 2017 [facility] Information is for End User's use only and may not be sold, redistributed or otherwise used for commercial purposes  All illustrations and images included in CareNotes® are the copyrighted property of [employer]  or [employer] Maxwell  The above information is an  only  It is not intended as medical advice for individual conditions or treatments  Talk to your doctor, nurse or pharmacist before following any medical regimen to see if it is safe and effective for you

## 2020-02-17 DIAGNOSIS — E11.65 POORLY CONTROLLED DIABETES MELLITUS (HCC): ICD-10-CM

## 2020-02-18 DIAGNOSIS — E11.65 POORLY CONTROLLED TYPE 2 DIABETES MELLITUS (HCC): ICD-10-CM

## 2020-02-18 RX ORDER — GLIPIZIDE 10 MG/1
TABLET, FILM COATED, EXTENDED RELEASE ORAL
Qty: 90 TABLET | Refills: 0 | Status: SHIPPED | OUTPATIENT
Start: 2020-02-18 | End: 2020-05-19

## 2020-03-09 DIAGNOSIS — D64.9 LOW HEMOGLOBIN: Primary | ICD-10-CM

## 2020-03-09 LAB
ALBUMIN SERPL-MCNC: 4.2 G/DL (ref 3.6–5.1)
ALBUMIN/CREAT UR: 18 MCG/MG CREAT
ALBUMIN/GLOB SERPL: 1.6 (CALC) (ref 1–2.5)
ALP SERPL-CCNC: 56 U/L (ref 35–144)
ALT SERPL-CCNC: 10 U/L (ref 9–46)
AST SERPL-CCNC: 11 U/L (ref 10–35)
BASOPHILS # BLD AUTO: 58 CELLS/UL (ref 0–200)
BASOPHILS NFR BLD AUTO: 0.7 %
BILIRUB SERPL-MCNC: 0.5 MG/DL (ref 0.2–1.2)
BUN SERPL-MCNC: 14 MG/DL (ref 7–25)
BUN/CREAT SERPL: ABNORMAL (CALC) (ref 6–22)
CALCIUM SERPL-MCNC: 9.5 MG/DL (ref 8.6–10.3)
CHLORIDE SERPL-SCNC: 103 MMOL/L (ref 98–110)
CHOLEST SERPL-MCNC: 139 MG/DL
CHOLEST/HDLC SERPL: 3.6 (CALC)
CO2 SERPL-SCNC: 25 MMOL/L (ref 20–32)
CREAT SERPL-MCNC: 0.97 MG/DL (ref 0.7–1.25)
CREAT UR-MCNC: 77 MG/DL (ref 20–320)
EOSINOPHIL # BLD AUTO: 183 CELLS/UL (ref 15–500)
EOSINOPHIL NFR BLD AUTO: 2.2 %
ERYTHROCYTE [DISTWIDTH] IN BLOOD BY AUTOMATED COUNT: 12.9 % (ref 11–15)
GLOBULIN SER CALC-MCNC: 2.6 G/DL (CALC) (ref 1.9–3.7)
GLUCOSE SERPL-MCNC: 118 MG/DL (ref 65–99)
HCT VFR BLD AUTO: 38.6 % (ref 38.5–50)
HDLC SERPL-MCNC: 39 MG/DL
HGB BLD-MCNC: 12.8 G/DL (ref 13.2–17.1)
LDLC SERPL CALC-MCNC: 84 MG/DL (CALC)
LYMPHOCYTES # BLD AUTO: 1984 CELLS/UL (ref 850–3900)
LYMPHOCYTES NFR BLD AUTO: 23.9 %
MCH RBC QN AUTO: 27.2 PG (ref 27–33)
MCHC RBC AUTO-ENTMCNC: 33.2 G/DL (ref 32–36)
MCV RBC AUTO: 82 FL (ref 80–100)
MICROALBUMIN UR-MCNC: 1.4 MG/DL
MONOCYTES # BLD AUTO: 606 CELLS/UL (ref 200–950)
MONOCYTES NFR BLD AUTO: 7.3 %
NEUTROPHILS # BLD AUTO: 5470 CELLS/UL (ref 1500–7800)
NEUTROPHILS NFR BLD AUTO: 65.9 %
NONHDLC SERPL-MCNC: 100 MG/DL (CALC)
PLATELET # BLD AUTO: 299 THOUSAND/UL (ref 140–400)
PMV BLD REES-ECKER: 9.6 FL (ref 7.5–12.5)
POTASSIUM SERPL-SCNC: 4.3 MMOL/L (ref 3.5–5.3)
PROT SERPL-MCNC: 6.8 G/DL (ref 6.1–8.1)
PSA SERPL-MCNC: 0.7 NG/ML
RBC # BLD AUTO: 4.71 MILLION/UL (ref 4.2–5.8)
SL AMB EGFR AFRICAN AMERICAN: 98 ML/MIN/1.73M2
SL AMB EGFR NON AFRICAN AMERICAN: 84 ML/MIN/1.73M2
SODIUM SERPL-SCNC: 139 MMOL/L (ref 135–146)
TRIGL SERPL-MCNC: 74 MG/DL
TSH SERPL-ACNC: 0.83 MIU/L (ref 0.4–4.5)
WBC # BLD AUTO: 8.3 THOUSAND/UL (ref 3.8–10.8)

## 2020-03-09 PROCEDURE — 3061F NEG MICROALBUMINURIA REV: CPT | Performed by: PHYSICIAN ASSISTANT

## 2020-03-26 DIAGNOSIS — E11.65 POORLY CONTROLLED TYPE 2 DIABETES MELLITUS (HCC): ICD-10-CM

## 2020-03-26 RX ORDER — SITAGLIPTIN 100 MG/1
TABLET, FILM COATED ORAL
Qty: 90 TABLET | Refills: 0 | Status: SHIPPED | OUTPATIENT
Start: 2020-03-26 | End: 2020-06-23

## 2020-05-11 DIAGNOSIS — E11.65 POORLY CONTROLLED DIABETES MELLITUS (HCC): ICD-10-CM

## 2020-05-12 DIAGNOSIS — E11.65 POORLY CONTROLLED TYPE 2 DIABETES MELLITUS (HCC): ICD-10-CM

## 2020-05-19 ENCOUNTER — TELEPHONE (OUTPATIENT)
Dept: FAMILY MEDICINE CLINIC | Facility: CLINIC | Age: 60
End: 2020-05-19

## 2020-05-19 RX ORDER — GLIPIZIDE 10 MG/1
TABLET, FILM COATED, EXTENDED RELEASE ORAL
Qty: 90 TABLET | Refills: 0 | Status: SHIPPED | OUTPATIENT
Start: 2020-05-19 | End: 2020-08-10

## 2020-05-29 ENCOUNTER — OFFICE VISIT (OUTPATIENT)
Dept: FAMILY MEDICINE CLINIC | Facility: CLINIC | Age: 60
End: 2020-05-29
Payer: COMMERCIAL

## 2020-05-29 VITALS
SYSTOLIC BLOOD PRESSURE: 108 MMHG | BODY MASS INDEX: 34.97 KG/M2 | HEART RATE: 92 BPM | WEIGHT: 244.25 LBS | HEIGHT: 70 IN | DIASTOLIC BLOOD PRESSURE: 78 MMHG | OXYGEN SATURATION: 97 %

## 2020-05-29 DIAGNOSIS — E78.5 HYPERLIPIDEMIA, UNSPECIFIED HYPERLIPIDEMIA TYPE: ICD-10-CM

## 2020-05-29 DIAGNOSIS — E11.65 POORLY CONTROLLED TYPE 2 DIABETES MELLITUS (HCC): ICD-10-CM

## 2020-05-29 DIAGNOSIS — G47.30 SLEEP APNEA, UNSPECIFIED TYPE: ICD-10-CM

## 2020-05-29 DIAGNOSIS — E11.3293 TYPE 2 DIABETES MELLITUS WITH MILD NONPROLIFERATIVE RETINOPATHY OF BOTH EYES, WITHOUT LONG-TERM CURRENT USE OF INSULIN, MACULAR EDEMA PRESENCE UNSPECIFIED (HCC): Primary | ICD-10-CM

## 2020-05-29 DIAGNOSIS — E66.9 OBESITY (BMI 30-39.9): ICD-10-CM

## 2020-05-29 DIAGNOSIS — D64.9 ANEMIA, UNSPECIFIED TYPE: ICD-10-CM

## 2020-05-29 LAB — SL AMB POCT HEMOGLOBIN AIC: 7.8 (ref ?–6.5)

## 2020-05-29 PROCEDURE — 83036 HEMOGLOBIN GLYCOSYLATED A1C: CPT | Performed by: PHYSICIAN ASSISTANT

## 2020-05-29 PROCEDURE — 1036F TOBACCO NON-USER: CPT | Performed by: PHYSICIAN ASSISTANT

## 2020-05-29 PROCEDURE — 3051F HG A1C>EQUAL 7.0%<8.0%: CPT | Performed by: PHYSICIAN ASSISTANT

## 2020-05-29 PROCEDURE — 3008F BODY MASS INDEX DOCD: CPT | Performed by: PHYSICIAN ASSISTANT

## 2020-05-29 PROCEDURE — 3074F SYST BP LT 130 MM HG: CPT | Performed by: PHYSICIAN ASSISTANT

## 2020-05-29 PROCEDURE — 99214 OFFICE O/P EST MOD 30 MIN: CPT | Performed by: PHYSICIAN ASSISTANT

## 2020-05-29 PROCEDURE — 3078F DIAST BP <80 MM HG: CPT | Performed by: PHYSICIAN ASSISTANT

## 2020-06-20 DIAGNOSIS — E11.65 POORLY CONTROLLED TYPE 2 DIABETES MELLITUS (HCC): ICD-10-CM

## 2020-06-23 RX ORDER — SITAGLIPTIN 100 MG/1
TABLET, FILM COATED ORAL
Qty: 90 TABLET | Refills: 0 | Status: SHIPPED | OUTPATIENT
Start: 2020-06-23 | End: 2020-09-19

## 2020-08-10 DIAGNOSIS — E11.65 POORLY CONTROLLED DIABETES MELLITUS (HCC): ICD-10-CM

## 2020-08-10 DIAGNOSIS — E11.65 POORLY CONTROLLED TYPE 2 DIABETES MELLITUS (HCC): ICD-10-CM

## 2020-08-10 RX ORDER — GLIPIZIDE 10 MG/1
TABLET, FILM COATED, EXTENDED RELEASE ORAL
Qty: 90 TABLET | Refills: 0 | Status: SHIPPED | OUTPATIENT
Start: 2020-08-10 | End: 2020-11-03

## 2020-08-10 NOTE — TELEPHONE ENCOUNTER
Approved, but he is due for his diabetes follow-up at the end of the month  Please call him to schedule this

## 2020-08-11 ENCOUNTER — TELEPHONE (OUTPATIENT)
Dept: FAMILY MEDICINE CLINIC | Facility: CLINIC | Age: 60
End: 2020-08-11

## 2020-09-17 DIAGNOSIS — E11.65 POORLY CONTROLLED TYPE 2 DIABETES MELLITUS (HCC): ICD-10-CM

## 2020-09-17 NOTE — PROGRESS NOTES
FAMILY PRACTICE OFFICE VISIT  [facility] - [facility]       NAME: Lynne Augustine  AGE: 63 y o  SEX: male       : 1960        MRN: [id number]    DATE: 2020  TIME: 1:10 AM    Assessment and Plan     Problem List Items Addressed This Visit        Endocrine    Type 2 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, without long-term current use of insulin ([address] ) - Primary     Improved  He will continue with metformin 1000 mg twice daily, Januvia 100 mg daily, glipizide XL 10 mg daily, and Jardiance 25 mg daily  He is up-to-date on his foot exam but was encouraged to get up-to-date on his eye exam   Follow-up in 3 months  Lab Results   Component Value Date    HGBA1C 7 1 (A) 2020            Relevant Orders    Ambulatory Referral to Ophthalmology    Comprehensive metabolic panel    Lipid Panel with Direct LDL reflex    Hemoglobin A1C    TSH, 3rd generation with Free T4 reflex    Microalbumin / creatinine urine ratio       Respiratory    Sleep apnea     Controlled  He will continue with regular CPAP use  Will continue to monitor  Other    Hyperlipidemia     Not on a statin  Patient wanted to see if his cholesterol improved with getting his diabetes under better control  He will be going for blood work as previously ordered  Relevant Orders    Comprehensive metabolic panel    Lipid Panel with Direct LDL reflex    Hemoglobin A1C    TSH, 3rd generation with Free T4 reflex    Microalbumin / creatinine urine ratio    Obesity (BMI 30-39  9)     Patient has lost 11 lb since his last visit  He was encouraged to continue exercising regularly and trying to follow a low carb diet  Will continue to monitor  BMI Counseling: Body mass index is 33 5 kg/m²  The BMI is above normal  Nutrition recommendations include moderation in carbohydrate intake   Exercise recommendations include moderate aerobic physical activity for 150 minutes/week and exercising 3-5 times per week  Relevant Orders    Comprehensive metabolic panel    Lipid Panel with Direct LDL reflex    Hemoglobin A1C    TSH, 3rd generation with Free T4 reflex    Microalbumin / creatinine urine ratio    Anemia     Patient had mild anemia in labs earlier in the year  He will go for repeat labs to recheck this as previously ordered  Decreased pulse     Encouraged patient to check arterial duplex to evaluate for decreased pulses in PT locations bilaterally  Relevant Orders    VAS lower limb arterial duplex, complete bilateral      Other Visit Diagnoses     Need for Tdap vaccination        Relevant Orders    POCT hemoglobin A1c (Completed)    TDAP VACCINE GREATER THAN OR EQUAL TO 6YO IM (Completed)    Flu vaccine need        Relevant Orders    influenza vaccine, quadrivalent, recombinant, PF, 0 5 mL, for patients 18 yr+ (FLUBLOK) (Completed)    Screening for HIV (human immunodeficiency virus)        Relevant Orders    Human Immunodeficiency Virus 1/2 Antigen / Antibody ( Fourth Generation) with Reflex Testing      The USPSTF recommendation for HIV screening in all patients between 13and 72years old (once in lifetime or annually with risk factors) was discussed with the patient  The patient agreed to testing  Type 2 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, without long-term current use of insulin (HCC)  Improved  He will continue with metformin 1000 mg twice daily, Januvia 100 mg daily, glipizide XL 10 mg daily, and Jardiance 25 mg daily  He is up-to-date on his foot exam but was encouraged to get up-to-date on his eye exam   Follow-up in 3 months  Lab Results   Component Value Date    HGBA1C 7 1 (A) 09/18/2020       Sleep apnea  Controlled  He will continue with regular CPAP use  Will continue to monitor  Anemia  Patient had mild anemia in labs earlier in the year    He will go for repeat labs to recheck this as previously ordered  Hyperlipidemia  Not on a statin  Patient wanted to see if his cholesterol improved with getting his diabetes under better control  He will be going for blood work as previously ordered  Obesity (BMI 30-39  9)  Patient has lost 11 lb since his last visit  He was encouraged to continue exercising regularly and trying to follow a low carb diet  Will continue to monitor  BMI Counseling: Body mass index is 33 5 kg/m²  The BMI is above normal  Nutrition recommendations include moderation in carbohydrate intake  Exercise recommendations include moderate aerobic physical activity for 150 minutes/week and exercising 3-5 times per week  Decreased pulse  Encouraged patient to check arterial duplex to evaluate for decreased pulses in PT locations bilaterally  Chief Complaint   No chief complaint on file  History of Present Illness   Kim Trevizo is a 63y o -year-old male who presents for 3 month follow-up on chronic medical problems  The patient presents today for follow-up on diabetes  At the last visit, A1c was 7 8% on 5/29/2020  The patient reports that current diabetic medications include metformin 1000 mg twice daily, Januvia 100 mg daily, glipizide XL 10 mg daily, and increased dose of Jardiance 25 mg daily  Fasting blood sugars in the morning are approximately 108  Postprandial glucoses readings are not checked  The patient denies hypoglycemic episodes  Last eye exam was 2017  Last foot exam was 2/2021  Today's A1c in the office is 7 1%  The patient is not currently on a statin for his cholesterol  Last LDL was 84 in 3/2020  His calculated ASCVD risk is 8 4%  Since the patent's last visit, weight has decreased about 11 pounds  Diet is reported to be stable  Exercise occurs 2 times per week for about 20-40 miles per session  The patient feels that sleep apnea has been controlled  Regular CPAP use is confirmed - sleeps 5 hours and feels rested  The patient denies excessive daytime sleepiness, known apneas, or nonrestorative sleep  Of note, he had mild anemia on labs earlier this year  Recheck labs were not done yet  He notes that he has trouble with ear wax in the past - no current symptoms but wants it checked        Review of Systems   Review of Systems   Constitutional: Negative for chills and fever  Respiratory: Negative for shortness of breath  Cardiovascular: Negative for chest pain, palpitations and leg swelling  Neurological: Negative for dizziness and headaches  Active Problem List     Patient Active Problem List   Diagnosis    Asthma    Basal cell carcinoma of skin    Environmental and seasonal allergies    Hyperlipidemia    History of hypertension    Obesity (BMI 30-39  9)    Onychomycosis of toenail    Pannus, abdominal    Type 2 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, without long-term current use of insulin (HCC)    Sciatica of left side    Sleep apnea    Bilateral impacted cerumen    Anemia    Decreased pulse         Past Medical History:  Past Medical History:   Diagnosis Date    Diabetes mellitus ([facility] [address] )        Past Surgical History:  Past Surgical History:   Procedure Laterality Date    EAR SURGERY Right 12/2014    Basal cancer removal    GASTRIC BYPASS  2015       Family History:  Family History   Problem Relation Age of Onset    Diabetes Family     Hypertension Family     Heart attack Family        Social History:  Social History     Socioeconomic History    Marital status: /Civil Union     Spouse name: Not on file    Number of children: Not on file    Years of education: Not on file    Highest education level: Not on file   Occupational History    Not on file   Social Needs    Financial resource strain: Not on file    Food insecurity     Worry: Not on file     Inability: Not on file    Transportation needs     Medical: Not on file     Non-medical: Not on file   Tobacco Use    Smoking status: Former Smoker     Last attempt to quit: 1977     Years since quittin 7    Smokeless tobacco: Never Used   Substance and Sexual Activity    Alcohol use: Yes     Comment: Social     Drug use: No    Sexual activity: Not on file   Lifestyle    Physical activity     Days per week: Not on file     Minutes per session: Not on file    Stress: Not on file   Relationships    Social connections     Talks on phone: Not on file     Gets together: Not on file     Attends [employer] service: Not on file     Active member of club or organization: Not on file     Attends meetings of clubs or organizations: Not on file     Relationship status: Not on file    Intimate partner violence     Fear of current or ex partner: Not on file     Emotionally abused: Not on file     Physically abused: Not on file     Forced sexual activity: Not on file   Other Topics Concern    Not on file   Social History Narrative    Not on file       Objective     Vitals:    20 1019   BP: 92/70   BP Location: Left arm   Patient Position: Sitting   Cuff Size: Standard   Pulse: 78   Temp: 98 °F (36 7 °C)   SpO2: 97%   Weight: 106 kg (233 lb 8 oz)   Height: 5' 10" (1 778 m)     Wt Readings from Last 3 Encounters:   20 106 kg (233 lb 8 oz)   20 111 kg (244 lb)   20 111 kg (244 lb 4 oz)       Physical Exam  Vitals signs reviewed  Constitutional:       General: He is not in acute distress  Appearance: Normal appearance  He is well-developed  He is obese  He is not ill-appearing  HENT:      Head: Normocephalic and atraumatic  Neck:      Musculoskeletal: Neck supple  Thyroid: No thyromegaly  Cardiovascular:      Rate and Rhythm: Normal rate and regular rhythm  Pulses:           Posterior tibial pulses are 1+ on the right side and 1+ on the left side  Heart sounds: Normal heart sounds  No murmur        Comments: No carotid bruits noted  Pulmonary:      Effort: Pulmonary effort is normal       Breath sounds: Normal breath sounds  No wheezing or rales  Musculoskeletal:      Right lower leg: No edema  Left lower leg: No edema  Lymphadenopathy:      Cervical: No cervical adenopathy  Skin:     General: Skin is warm and dry  Findings: No rash  Neurological:      Mental Status: He is alert     Psychiatric:         Mood and Affect: Mood normal          Behavior: Behavior normal          Pertinent Laboratory/Diagnostic Studies:  Lab Results   Component Value Date    BUN 14 03/06/2020    CREATININE 0 97 03/06/2020    CALCIUM 9 5 03/06/2020     11/18/2017    K 4 3 03/06/2020    CO2 25 03/06/2020     03/06/2020     Lab Results   Component Value Date    ALT 10 03/06/2020    AST 11 03/06/2020    ALKPHOS 56 03/06/2020    BILITOT 0 7 11/18/2017       Lab Results   Component Value Date    WBC 8 3 03/06/2020    HGB 12 8 (L) 03/06/2020    HCT 38 6 03/06/2020    MCV 82 0 03/06/2020     03/06/2020     Lab Results   Component Value Date    CHOL 133 11/18/2017     Lab Results   Component Value Date    TRIG 74 03/06/2020     Lab Results   Component Value Date    HDL 39 (L) 03/06/2020     Lab Results   Component Value Date    LDLCALC 84 03/06/2020     Lab Results   Component Value Date    HGBA1C 7 1 (A) 09/18/2020       Results for orders placed or performed in visit on 09/18/20   POCT hemoglobin A1c   Result Value Ref Range    Hemoglobin A1C 7 1 (A) 6 5         ALLERGIES:  No Known Allergies    Current Medications     Current Outpatient Medications   Medication Sig Dispense Refill    Empagliflozin (Jardiance) 25 MG TABS Take 1 tablet (25 mg total) by mouth every morning 30 tablet 5    glipiZIDE (GLUCOTROL XL) 10 mg 24 hr tablet TAKE 1 TABLET BY MOUTH EVERY DAY 90 tablet 0    JANUVIA 100 MG tablet TAKE 1 TABLET BY MOUTH EVERY DAY 90 tablet 0    metFORMIN (GLUCOPHAGE) 1000 MG tablet TAKE 1 TABLET BY MOUTH TWICE A  tablet 0    Multiple Vitamin (MULTI-DAY) TABS Take by mouth daily      fluticasone (FLONASE) 50 mcg/act nasal spray 2 sprays into each nostril daily as needed        No current facility-administered medications for this visit            Health Maintenance     Health Maintenance   Topic Date Due    HIV Screening  05/22/1974    Annual Physical  05/22/1977    DM Eye Exam  12/30/2018    Colonoscopy Surveillance  03/24/2020    HEMOGLOBIN A1C  12/18/2020    Diabetic Foot Exam  02/07/2021    URINE MICROALBUMIN  03/06/2021    Depression Screening PHQ  05/29/2021    BMI: Followup Plan  05/29/2021    BMI: Adult  09/18/2021    Colorectal Cancer Screening  03/24/2027    DTaP,Tdap,and Td Vaccines (2 - Td) 09/18/2030    Hepatitis C Screening  Completed    Pneumococcal Vaccine: Pediatrics (0 to 5 Years) and At-Risk Patients (6 to 59 Years)  Completed    Influenza Vaccine  Completed    HIB Vaccine  Aged Out    Hepatitis B Vaccine  Aged Out    IPV Vaccine  Aged Out    Hepatitis A Vaccine  Aged Out    Meningococcal ACWY Vaccine  Aged Out    HPV Vaccine  Aged Out     Immunization History   Administered Date(s) Administered    Influenza Quadrivalent Preservative Free 3 years and older IM 10/03/2014, 11/25/2016, 12/13/2017    Influenza TIV (IM) 01/30/2013, 11/29/2013    Influenza, recombinant, quadrivalent,injectable, preservative free 01/18/2019, 09/16/2019, 09/18/2020    Pneumococcal Polysaccharide PPV23 01/18/2019    Tdap 09/18/2020       Ivette Bang PA-C  9/19/2020 1:10 AM  [facility]

## 2020-09-18 ENCOUNTER — OFFICE VISIT (OUTPATIENT)
Dept: FAMILY MEDICINE CLINIC | Facility: CLINIC | Age: 60
End: 2020-09-18
Payer: COMMERCIAL

## 2020-09-18 VITALS
HEIGHT: 70 IN | DIASTOLIC BLOOD PRESSURE: 70 MMHG | HEART RATE: 78 BPM | TEMPERATURE: 98 F | SYSTOLIC BLOOD PRESSURE: 92 MMHG | OXYGEN SATURATION: 97 % | WEIGHT: 233.5 LBS | BODY MASS INDEX: 33.43 KG/M2

## 2020-09-18 DIAGNOSIS — E11.3293 TYPE 2 DIABETES MELLITUS WITH MILD NONPROLIFERATIVE RETINOPATHY OF BOTH EYES, WITHOUT LONG-TERM CURRENT USE OF INSULIN, MACULAR EDEMA PRESENCE UNSPECIFIED (HCC): Primary | ICD-10-CM

## 2020-09-18 DIAGNOSIS — E78.5 HYPERLIPIDEMIA, UNSPECIFIED HYPERLIPIDEMIA TYPE: ICD-10-CM

## 2020-09-18 DIAGNOSIS — R09.89 DECREASED PULSE: ICD-10-CM

## 2020-09-18 DIAGNOSIS — Z11.4 SCREENING FOR HIV (HUMAN IMMUNODEFICIENCY VIRUS): ICD-10-CM

## 2020-09-18 DIAGNOSIS — G47.30 SLEEP APNEA, UNSPECIFIED TYPE: ICD-10-CM

## 2020-09-18 DIAGNOSIS — Z23 NEED FOR TDAP VACCINATION: ICD-10-CM

## 2020-09-18 DIAGNOSIS — Z23 FLU VACCINE NEED: ICD-10-CM

## 2020-09-18 DIAGNOSIS — D64.9 ANEMIA, UNSPECIFIED TYPE: ICD-10-CM

## 2020-09-18 DIAGNOSIS — E66.9 OBESITY (BMI 30-39.9): ICD-10-CM

## 2020-09-18 LAB — SL AMB POCT HEMOGLOBIN AIC: 7.1 (ref ?–6.5)

## 2020-09-18 PROCEDURE — 3078F DIAST BP <80 MM HG: CPT | Performed by: PHYSICIAN ASSISTANT

## 2020-09-18 PROCEDURE — 90471 IMMUNIZATION ADMIN: CPT

## 2020-09-18 PROCEDURE — 83036 HEMOGLOBIN GLYCOSYLATED A1C: CPT | Performed by: PHYSICIAN ASSISTANT

## 2020-09-18 PROCEDURE — 90472 IMMUNIZATION ADMIN EACH ADD: CPT

## 2020-09-18 PROCEDURE — 99214 OFFICE O/P EST MOD 30 MIN: CPT | Performed by: PHYSICIAN ASSISTANT

## 2020-09-18 PROCEDURE — 1036F TOBACCO NON-USER: CPT | Performed by: PHYSICIAN ASSISTANT

## 2020-09-18 PROCEDURE — 90682 RIV4 VACC RECOMBINANT DNA IM: CPT

## 2020-09-18 PROCEDURE — 3051F HG A1C>EQUAL 7.0%<8.0%: CPT | Performed by: PHYSICIAN ASSISTANT

## 2020-09-18 PROCEDURE — 90715 TDAP VACCINE 7 YRS/> IM: CPT

## 2020-09-19 PROBLEM — R09.89 DECREASED PULSE: Status: ACTIVE | Noted: 2020-09-19

## 2020-09-19 RX ORDER — SITAGLIPTIN 100 MG/1
TABLET, FILM COATED ORAL
Qty: 90 TABLET | Refills: 0 | Status: SHIPPED | OUTPATIENT
Start: 2020-09-19 | End: 2020-12-15

## 2020-09-19 NOTE — ASSESSMENT & PLAN NOTE
Improved  He will continue with metformin 1000 mg twice daily, Januvia 100 mg daily, glipizide XL 10 mg daily, and Jardiance 25 mg daily  He is up-to-date on his foot exam but was encouraged to get up-to-date on his eye exam   Follow-up in 3 months    Lab Results   Component Value Date    HGBA1C 7 1 (A) 09/18/2020

## 2020-09-19 NOTE — ASSESSMENT & PLAN NOTE
Patient had mild anemia in labs earlier in the year  He will go for repeat labs to recheck this as previously ordered

## 2020-09-19 NOTE — ASSESSMENT & PLAN NOTE
Patient has lost 11 lb since his last visit  He was encouraged to continue exercising regularly and trying to follow a low carb diet  Will continue to monitor  BMI Counseling: Body mass index is 33 5 kg/m²  The BMI is above normal  Nutrition recommendations include moderation in carbohydrate intake  Exercise recommendations include moderate aerobic physical activity for 150 minutes/week and exercising 3-5 times per week

## 2020-09-19 NOTE — ASSESSMENT & PLAN NOTE
Not on a statin  Patient wanted to see if his cholesterol improved with getting his diabetes under better control  He will be going for blood work as previously ordered

## 2020-09-19 NOTE — ASSESSMENT & PLAN NOTE
Encouraged patient to check arterial duplex to evaluate for decreased pulses in PT locations bilaterally

## 2020-10-17 DIAGNOSIS — E11.3293 TYPE 2 DIABETES MELLITUS WITH MILD NONPROLIFERATIVE RETINOPATHY OF BOTH EYES, WITHOUT LONG-TERM CURRENT USE OF INSULIN, MACULAR EDEMA PRESENCE UNSPECIFIED (HCC): ICD-10-CM

## 2020-10-20 RX ORDER — EMPAGLIFLOZIN 25 MG/1
TABLET, FILM COATED ORAL
Qty: 90 TABLET | Refills: 0 | Status: SHIPPED | OUTPATIENT
Start: 2020-10-20 | End: 2021-02-12 | Stop reason: SDUPTHER

## 2020-10-30 LAB
LEFT EYE DIABETIC RETINOPATHY: NORMAL
RIGHT EYE DIABETIC RETINOPATHY: NORMAL

## 2020-11-03 DIAGNOSIS — E11.65 POORLY CONTROLLED DIABETES MELLITUS (HCC): ICD-10-CM

## 2020-11-03 DIAGNOSIS — E11.65 POORLY CONTROLLED TYPE 2 DIABETES MELLITUS (HCC): ICD-10-CM

## 2020-11-03 RX ORDER — GLIPIZIDE 10 MG/1
TABLET, FILM COATED, EXTENDED RELEASE ORAL
Qty: 90 TABLET | Refills: 0 | Status: SHIPPED | OUTPATIENT
Start: 2020-11-03 | End: 2021-01-28

## 2020-12-15 DIAGNOSIS — E11.65 POORLY CONTROLLED TYPE 2 DIABETES MELLITUS (HCC): ICD-10-CM

## 2020-12-15 RX ORDER — SITAGLIPTIN 100 MG/1
TABLET, FILM COATED ORAL
Qty: 90 TABLET | Refills: 0 | Status: SHIPPED | OUTPATIENT
Start: 2020-12-15 | End: 2021-02-12 | Stop reason: ALTCHOICE

## 2020-12-15 NOTE — TELEPHONE ENCOUNTER
Approved without refills since he is due for a follow-up  Please schedule him for a diabetes follow-up

## 2021-01-27 DIAGNOSIS — E11.65 POORLY CONTROLLED TYPE 2 DIABETES MELLITUS (HCC): ICD-10-CM

## 2021-01-28 RX ORDER — GLIPIZIDE 10 MG/1
TABLET, FILM COATED, EXTENDED RELEASE ORAL
Qty: 90 TABLET | Refills: 0 | Status: SHIPPED | OUTPATIENT
Start: 2021-01-28 | End: 2021-02-12 | Stop reason: ALTCHOICE

## 2021-02-05 ENCOUNTER — TELEPHONE (OUTPATIENT)
Dept: OTHER | Facility: OTHER | Age: 61
End: 2021-02-05

## 2021-02-05 NOTE — TELEPHONE ENCOUNTER
Information and warnings  Appointment selection checkbox  Date Time Zaheer Providers Departments Visit Type                   C [x] 2/05/21 8:00 AM Narendra Louie MD [[id number]] [facility]  [[id number]] [facility] [[id number]]                                   C - Canceled

## 2021-02-08 NOTE — TELEPHONE ENCOUNTER
Second attempt: Lm for patient to call back to schedule a follow-up in March  Appt re: diabetes follow up

## 2021-02-12 ENCOUNTER — OFFICE VISIT (OUTPATIENT)
Dept: FAMILY MEDICINE CLINIC | Facility: CLINIC | Age: 61
End: 2021-02-12
Payer: COMMERCIAL

## 2021-02-12 VITALS
BODY MASS INDEX: 33.36 KG/M2 | HEART RATE: 80 BPM | OXYGEN SATURATION: 98 % | WEIGHT: 233 LBS | RESPIRATION RATE: 18 BRPM | DIASTOLIC BLOOD PRESSURE: 82 MMHG | HEIGHT: 70 IN | SYSTOLIC BLOOD PRESSURE: 128 MMHG

## 2021-02-12 DIAGNOSIS — R53.83 OTHER FATIGUE: ICD-10-CM

## 2021-02-12 DIAGNOSIS — E11.3293 TYPE 2 DIABETES MELLITUS WITH MILD NONPROLIFERATIVE RETINOPATHY OF BOTH EYES, WITHOUT LONG-TERM CURRENT USE OF INSULIN, MACULAR EDEMA PRESENCE UNSPECIFIED (HCC): ICD-10-CM

## 2021-02-12 DIAGNOSIS — Z12.5 PROSTATE CANCER SCREENING: ICD-10-CM

## 2021-02-12 DIAGNOSIS — Z12.11 COLON CANCER SCREENING: ICD-10-CM

## 2021-02-12 DIAGNOSIS — E11.65 POORLY CONTROLLED TYPE 2 DIABETES MELLITUS (HCC): ICD-10-CM

## 2021-02-12 DIAGNOSIS — Z00.00 ANNUAL PHYSICAL EXAM: Primary | ICD-10-CM

## 2021-02-12 DIAGNOSIS — E66.9 OBESITY (BMI 30-39.9): ICD-10-CM

## 2021-02-12 LAB — SL AMB POCT HEMOGLOBIN AIC: 7.4 (ref ?–6.5)

## 2021-02-12 PROCEDURE — 3051F HG A1C>EQUAL 7.0%<8.0%: CPT | Performed by: INTERNAL MEDICINE

## 2021-02-12 PROCEDURE — 3725F SCREEN DEPRESSION PERFORMED: CPT | Performed by: INTERNAL MEDICINE

## 2021-02-12 PROCEDURE — 3008F BODY MASS INDEX DOCD: CPT | Performed by: INTERNAL MEDICINE

## 2021-02-12 PROCEDURE — 83036 HEMOGLOBIN GLYCOSYLATED A1C: CPT | Performed by: INTERNAL MEDICINE

## 2021-02-12 PROCEDURE — 99396 PREV VISIT EST AGE 40-64: CPT | Performed by: INTERNAL MEDICINE

## 2021-02-12 PROCEDURE — 1036F TOBACCO NON-USER: CPT | Performed by: INTERNAL MEDICINE

## 2021-02-12 RX ORDER — DULAGLUTIDE 0.75 MG/.5ML
0.75 INJECTION, SOLUTION SUBCUTANEOUS WEEKLY
Qty: 4 PEN | Refills: 0 | Status: SHIPPED | OUTPATIENT
Start: 2021-02-12 | End: 2021-03-08

## 2021-02-12 RX ORDER — DULAGLUTIDE 1.5 MG/.5ML
1.5 INJECTION, SOLUTION SUBCUTANEOUS WEEKLY
Qty: 4 PEN | Refills: 1 | Status: SHIPPED | OUTPATIENT
Start: 2021-02-12 | End: 2021-11-19 | Stop reason: ALTCHOICE

## 2021-02-12 RX ORDER — EMPAGLIFLOZIN 25 MG/1
25 TABLET, FILM COATED ORAL EVERY MORNING
Qty: 90 TABLET | Refills: 3 | Status: SHIPPED | OUTPATIENT
Start: 2021-02-12 | End: 2022-01-25

## 2021-02-12 NOTE — PATIENT INSTRUCTIONS
Please stop to take glipizide and Januvia  Continue Jardiance   And metformin, start Trulicity 0 42 mg 1 injection per week for 4 weeks then after that start 1 5 mg injection weekly

## 2021-02-12 NOTE — PROGRESS NOTES
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, without long-term current use of insulin, macular edema presence unspecified (HCC)  Comments:    Hemoglobin A1c increased, 7 4  Continue metformin and   Jardiance, discontinue Januvia and glipizide  Will start him on Trulicity  Recheck hemoglobin A1c  Orders:  -     Empagliflozin (Jardiance) 25 MG TABS; Take 1 tablet (25 mg total) by mouth every morning  -     Dulaglutide (Trulicity) 1 94 SW/1 2WY SOPN; Inject 0 5 mL (0 75 mg total) under the skin once a week  -     Dulaglutide (Trulicity) 1 5 IW/6 2JK SOPN; Inject 0 5 mL (1 5 mg total) under the skin once a week  -     Comprehensive metabolic panel; Future  -     CBC and differential; Future  -     Lipid panel; Future  -     UA (URINE) with reflex to Scope    Other fatigue  -     TSH, 3rd generation with Free T4 reflex; Future  -     Vitamin D 25 hydroxy; Future    Prostate cancer screening  Comments: Will do PSA screening  Orders:  -     PSA, Total Screen; Future    Colon cancer screening  Comments:    Referral given  Orders:  -     Ambulatory referral for colonoscopy; Future          Subjective:      Patient ID: Amrit Tong is a 63 y o  male  Patient came today for annual checkup  He does not report any active complaints  No chest pain, palpitation, abdominal pain or urinary symptoms  He does not exercise, he is trying to  Keep healthy diet  He does not smoke, does not drink alcohol  He works as a   He has history of type 2 diabetes, his hemoglobin A1c today 7 4, increased from 7 1      His foot exam was normal     He follows up with Ophthalmology  His last colonoscopy was in 2017 with recommendation to repeat in 3-5 years, referral is given  He is pursuing prostate cancer screening  The following portions of the patient's history were reviewed and updated as appropriate: allergies, current medications, past family history, past medical history, past social history, past surgical history, and problem list     Review of Systems   Constitutional: Negative for chills, fatigue and fever  Respiratory: Negative for cough, chest tightness and shortness of breath  Cardiovascular: Negative for chest pain, palpitations and leg swelling  Gastrointestinal: Negative for abdominal distention, abdominal pain, blood in stool, constipation, diarrhea and nausea  Genitourinary: Negative for difficulty urinating and dysuria  Musculoskeletal: Negative for arthralgias, back pain, gait problem, joint swelling, myalgias and neck pain  Skin: Negative for rash  Neurological: Negative for dizziness, weakness, numbness and headaches  Psychiatric/Behavioral: Negative for agitation  Objective:      /82   Pulse 80   Resp 18   Ht 5' 10" (1 778 m)   Wt 106 kg (233 lb)   SpO2 98%   BMI 33 43 kg/m²          Physical Exam  Constitutional:       Appearance: He is not ill-appearing  HENT:      Head: Normocephalic and atraumatic  Nose: No congestion or rhinorrhea  Eyes:      Pupils: Pupils are equal, round, and reactive to light  Neck:      Musculoskeletal: No neck rigidity or muscular tenderness  Cardiovascular:      Rate and Rhythm: Normal rate and regular rhythm  Pulses: Normal pulses  Heart sounds: Normal heart sounds  No murmur  No friction rub  No gallop  Pulmonary:      Effort: Pulmonary effort is normal  No respiratory distress  Breath sounds: Normal breath sounds  No wheezing or rales  Chest:      Chest wall: No tenderness     Abdominal:      General: Bowel sounds are normal  There is no distension  Palpations: Abdomen is soft  There is no mass  Tenderness: There is no abdominal tenderness  There is no rebound  Hernia: No hernia is present  Musculoskeletal:         General: No swelling, tenderness or deformity  Skin:     Coloration: Skin is not pale  Findings: No erythema, lesion or rash  Neurological:      Mental Status: He is alert and oriented to person, place, and time  Sensory: No sensory deficit  Motor: No weakness     Psychiatric:         Mood and Affect: Mood normal          Behavior: Behavior normal                Current Outpatient Medications:     Empagliflozin (Jardiance) 25 MG TABS, Take 1 tablet (25 mg total) by mouth every morning, Disp: 90 tablet, Rfl: 3    fluticasone (FLONASE) 50 mcg/act nasal spray, 2 sprays into each nostril daily as needed , Disp: , Rfl:     metFORMIN (GLUCOPHAGE) 1000 MG tablet, TAKE 1 TABLET BY MOUTH TWICE A DAY, Disp: 180 tablet, Rfl: 0    Multiple Vitamin (MULTI-DAY) TABS, Take by mouth daily, Disp: , Rfl:     Dulaglutide (Trulicity) 5 69 /1 9CZ SOPN, Inject 0 5 mL (0 75 mg total) under the skin once a week, Disp: 4 pen, Rfl: 0    Dulaglutide (Trulicity) 1 5 IZ/2 2JI SOPN, Inject 0 5 mL (1 5 mg total) under the skin once a week, Disp: 4 pen, Rfl: 1

## 2021-02-12 NOTE — PROGRESS NOTES
Diabetic Foot Exam    Patient's shoes and socks removed  Right Foot/Ankle   Right Foot Inspection  Skin Exam: skin normal and skin intact no dry skin, no warmth, no callus, no erythema, no maceration, no abnormal color, no pre-ulcer, no ulcer and no callus                          Toe Exam: ROM and strength within normal limits  Sensory       Monofilament testing: intact  Vascular    The right DP pulse is 2+  The right PT pulse is 1+  Left Foot/Ankle  Left Foot Inspection  Skin Exam: skin normal and skin intactno dry skin, no warmth, no erythema, no maceration, normal color, no pre-ulcer, no ulcer and no callus                         Toe Exam: ROM and strength within normal limits                   Sensory       Monofilament: intact  Vascular    The left DP pulse is 2+  The left PT pulse is 1+  Assign Risk Category:  No deformity present;  No loss of protective sensation; Weak pulses       Risk: 1

## 2021-02-14 LAB
25(OH)D3 SERPL-MCNC: 26 NG/ML (ref 30–100)
ALBUMIN SERPL-MCNC: 4 G/DL (ref 3.6–5.1)
ALBUMIN/GLOB SERPL: 1.7 (CALC) (ref 1–2.5)
ALP SERPL-CCNC: 57 U/L (ref 35–144)
ALT SERPL-CCNC: 8 U/L (ref 9–46)
AST SERPL-CCNC: 10 U/L (ref 10–35)
BASOPHILS # BLD AUTO: 39 CELLS/UL (ref 0–200)
BASOPHILS NFR BLD AUTO: 0.5 %
BILIRUB SERPL-MCNC: 0.5 MG/DL (ref 0.2–1.2)
BUN SERPL-MCNC: 14 MG/DL (ref 7–25)
BUN/CREAT SERPL: ABNORMAL (CALC) (ref 6–22)
CALCIUM SERPL-MCNC: 9.3 MG/DL (ref 8.6–10.3)
CHLORIDE SERPL-SCNC: 105 MMOL/L (ref 98–110)
CHOLEST SERPL-MCNC: 143 MG/DL
CHOLEST/HDLC SERPL: 3.4 (CALC)
CO2 SERPL-SCNC: 28 MMOL/L (ref 20–32)
CREAT SERPL-MCNC: 0.79 MG/DL (ref 0.7–1.25)
EOSINOPHIL # BLD AUTO: 223 CELLS/UL (ref 15–500)
EOSINOPHIL NFR BLD AUTO: 2.9 %
ERYTHROCYTE [DISTWIDTH] IN BLOOD BY AUTOMATED COUNT: 14.2 % (ref 11–15)
GLOBULIN SER CALC-MCNC: 2.3 G/DL (CALC) (ref 1.9–3.7)
GLUCOSE SERPL-MCNC: 90 MG/DL (ref 65–99)
HCT VFR BLD AUTO: 38.4 % (ref 38.5–50)
HDLC SERPL-MCNC: 42 MG/DL
HGB BLD-MCNC: 12 G/DL (ref 13.2–17.1)
LDLC SERPL CALC-MCNC: 80 MG/DL (CALC)
LYMPHOCYTES # BLD AUTO: 1964 CELLS/UL (ref 850–3900)
LYMPHOCYTES NFR BLD AUTO: 25.5 %
MCH RBC QN AUTO: 25.6 PG (ref 27–33)
MCHC RBC AUTO-ENTMCNC: 31.3 G/DL (ref 32–36)
MCV RBC AUTO: 82.1 FL (ref 80–100)
MONOCYTES # BLD AUTO: 562 CELLS/UL (ref 200–950)
MONOCYTES NFR BLD AUTO: 7.3 %
NEUTROPHILS # BLD AUTO: 4913 CELLS/UL (ref 1500–7800)
NEUTROPHILS NFR BLD AUTO: 63.8 %
NONHDLC SERPL-MCNC: 101 MG/DL (CALC)
PLATELET # BLD AUTO: 339 THOUSAND/UL (ref 140–400)
PMV BLD REES-ECKER: 9.8 FL (ref 7.5–12.5)
POTASSIUM SERPL-SCNC: 4 MMOL/L (ref 3.5–5.3)
PROT SERPL-MCNC: 6.3 G/DL (ref 6.1–8.1)
PSA SERPL-MCNC: 0.6 NG/ML
RBC # BLD AUTO: 4.68 MILLION/UL (ref 4.2–5.8)
SL AMB EGFR AFRICAN AMERICAN: 112 ML/MIN/1.73M2
SL AMB EGFR NON AFRICAN AMERICAN: 97 ML/MIN/1.73M2
SODIUM SERPL-SCNC: 140 MMOL/L (ref 135–146)
TRIGL SERPL-MCNC: 109 MG/DL
TSH SERPL-ACNC: 0.84 MIU/L (ref 0.4–4.5)
WBC # BLD AUTO: 7.7 THOUSAND/UL (ref 3.8–10.8)

## 2021-02-15 DIAGNOSIS — D64.9 ANEMIA, UNSPECIFIED TYPE: Primary | ICD-10-CM

## 2021-02-15 RX ORDER — MELATONIN
1000 DAILY
Qty: 90 TABLET | Refills: 3 | Status: SHIPPED | OUTPATIENT
Start: 2021-02-15 | End: 2021-11-19

## 2021-02-19 ENCOUNTER — IMMUNIZATIONS (OUTPATIENT)
Dept: FAMILY MEDICINE CLINIC | Facility: HOSPITAL | Age: 61
End: 2021-02-19

## 2021-02-19 DIAGNOSIS — Z23 ENCOUNTER FOR IMMUNIZATION: Primary | ICD-10-CM

## 2021-02-19 PROCEDURE — 91300 SARS-COV-2 / COVID-19 MRNA VACCINE (PFIZER-BIONTECH) 30 MCG: CPT

## 2021-02-19 PROCEDURE — 0001A SARS-COV-2 / COVID-19 MRNA VACCINE (PFIZER-BIONTECH) 30 MCG: CPT

## 2021-03-07 DIAGNOSIS — E11.3293 TYPE 2 DIABETES MELLITUS WITH MILD NONPROLIFERATIVE RETINOPATHY OF BOTH EYES, WITHOUT LONG-TERM CURRENT USE OF INSULIN, MACULAR EDEMA PRESENCE UNSPECIFIED (HCC): ICD-10-CM

## 2021-03-08 RX ORDER — DULAGLUTIDE 0.75 MG/.5ML
0.75 INJECTION, SOLUTION SUBCUTANEOUS WEEKLY
Qty: 2 PEN | Refills: 2 | Status: SHIPPED | OUTPATIENT
Start: 2021-03-08 | End: 2021-04-05

## 2021-03-12 ENCOUNTER — IMMUNIZATIONS (OUTPATIENT)
Dept: FAMILY MEDICINE CLINIC | Facility: HOSPITAL | Age: 61
End: 2021-03-12

## 2021-03-12 DIAGNOSIS — Z23 ENCOUNTER FOR IMMUNIZATION: Primary | ICD-10-CM

## 2021-03-12 PROCEDURE — 0002A SARS-COV-2 / COVID-19 MRNA VACCINE (PFIZER-BIONTECH) 30 MCG: CPT

## 2021-03-12 PROCEDURE — 91300 SARS-COV-2 / COVID-19 MRNA VACCINE (PFIZER-BIONTECH) 30 MCG: CPT

## 2021-04-01 DIAGNOSIS — E11.65 POORLY CONTROLLED DIABETES MELLITUS (HCC): ICD-10-CM

## 2021-04-05 DIAGNOSIS — E11.3293 TYPE 2 DIABETES MELLITUS WITH MILD NONPROLIFERATIVE RETINOPATHY OF BOTH EYES, WITHOUT LONG-TERM CURRENT USE OF INSULIN, MACULAR EDEMA PRESENCE UNSPECIFIED (HCC): ICD-10-CM

## 2021-04-05 RX ORDER — DULAGLUTIDE 0.75 MG/.5ML
0.75 INJECTION, SOLUTION SUBCUTANEOUS WEEKLY
Qty: 2 PEN | Refills: 2 | Status: SHIPPED | OUTPATIENT
Start: 2021-04-05 | End: 2021-08-02

## 2021-06-25 DIAGNOSIS — E11.65 POORLY CONTROLLED DIABETES MELLITUS (HCC): ICD-10-CM

## 2021-07-22 ENCOUNTER — OFFICE VISIT (OUTPATIENT)
Dept: FAMILY MEDICINE CLINIC | Facility: CLINIC | Age: 61
End: 2021-07-22
Payer: COMMERCIAL

## 2021-07-22 VITALS
OXYGEN SATURATION: 98 % | TEMPERATURE: 98.5 F | DIASTOLIC BLOOD PRESSURE: 70 MMHG | HEIGHT: 70 IN | WEIGHT: 230.2 LBS | BODY MASS INDEX: 32.96 KG/M2 | HEART RATE: 93 BPM | RESPIRATION RATE: 12 BRPM | SYSTOLIC BLOOD PRESSURE: 130 MMHG

## 2021-07-22 DIAGNOSIS — E11.3293 TYPE 2 DIABETES MELLITUS WITH MILD NONPROLIFERATIVE RETINOPATHY OF BOTH EYES, WITHOUT LONG-TERM CURRENT USE OF INSULIN, MACULAR EDEMA PRESENCE UNSPECIFIED (HCC): ICD-10-CM

## 2021-07-22 DIAGNOSIS — J45.20 MILD INTERMITTENT ASTHMA WITHOUT COMPLICATION: ICD-10-CM

## 2021-07-22 DIAGNOSIS — B37.0 ORAL THRUSH: Primary | ICD-10-CM

## 2021-07-22 DIAGNOSIS — J02.9 SORE THROAT: ICD-10-CM

## 2021-07-22 LAB — SL AMB POCT HEMOGLOBIN AIC: 7.5 (ref ?–6.5)

## 2021-07-22 PROCEDURE — 3008F BODY MASS INDEX DOCD: CPT | Performed by: FAMILY MEDICINE

## 2021-07-22 PROCEDURE — 3051F HG A1C>EQUAL 7.0%<8.0%: CPT | Performed by: FAMILY MEDICINE

## 2021-07-22 PROCEDURE — 99214 OFFICE O/P EST MOD 30 MIN: CPT | Performed by: FAMILY MEDICINE

## 2021-07-22 PROCEDURE — 1036F TOBACCO NON-USER: CPT | Performed by: FAMILY MEDICINE

## 2021-07-22 PROCEDURE — 83036 HEMOGLOBIN GLYCOSYLATED A1C: CPT | Performed by: FAMILY MEDICINE

## 2021-07-22 NOTE — ASSESSMENT & PLAN NOTE
Hemoglobin A1c is creeping up since last fall when it was 7 1, then 7 4, now 7 5    Urged him to really try to work on healthy low carb diet along with increased exercise  Lab Results   Component Value Date    HGBA1C 7 5 (A) 07/22/2021

## 2021-07-22 NOTE — PROGRESS NOTES
Assessment/Plan:    Type 2 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, without long-term current use of insulin (HCC)    Hemoglobin A1c is creeping up since last fall when it was 7 1, then 7 4, now 7 5  Urged him to really try to work on healthy low carb diet along with increased exercise  Lab Results   Component Value Date    HGBA1C 7 5 (A) 07/22/2021       Asthma    He denies recent asthma symptoms  He has not needed albuterol rescue inhaler for a long time  Diagnoses and all orders for this visit:    Oral thrush  -     nystatin (MYCOSTATIN) 500,000 units/5 mL suspension; Apply 5 mL (500,000 Units total) to the mouth or throat 4 (four) times a day    Sore throat    Type 2 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, without long-term current use of insulin, macular edema presence unspecified (HCC)  -     POCT hemoglobin A1c    Mild intermittent asthma without complication          Subjective:      Patient ID: Haritha Lanier is a 57 y o  male  He c/o ST for past week along with sore tongue  Pain is fairly constant  No nasal congestion or cough  No fever or chills  He works as   He has not missed work  He has used throat spray which helps    He uses Cpap machine at night  He is concerned about possible thrush        The following portions of the patient's history were reviewed and updated as appropriate: allergies, current medications, past family history, past medical history, past social history, past surgical history and problem list     Review of Systems   Constitutional: Negative for activity change, appetite change, chills, fatigue and fever  HENT: Positive for sore throat  Negative for congestion, ear pain and mouth sores  Respiratory: Negative for cough and shortness of breath  Gastrointestinal: Negative for abdominal pain, diarrhea, nausea and vomiting  Neurological: Negative for dizziness and headaches  Objective:      /70 (BP Location: Right arm, Patient Position: Sitting, Cuff Size: Standard)   Pulse 93   Temp 98 5 °F (36 9 °C)   Resp 12   Ht 5' 10" (1 778 m)   Wt 104 kg (230 lb 3 2 oz)   SpO2 98%   BMI 33 03 kg/m²          Physical Exam  Vitals and nursing note reviewed  Constitutional:       Appearance: He is obese  HENT:      Head: Normocephalic and atraumatic  Mouth/Throat:      Mouth: Mucous membranes are moist       Pharynx: Posterior oropharyngeal erythema present  Tonsils: 0 on the right  0 on the left  Comments: Tongue mildly red and beefy with a couple white patches  Cardiovascular:      Rate and Rhythm: Normal rate and regular rhythm  Pulmonary:      Effort: Pulmonary effort is normal       Breath sounds: Normal breath sounds  Musculoskeletal:      Cervical back: Normal range of motion and neck supple  Skin:     General: Skin is warm and dry  Neurological:      General: No focal deficit present  Mental Status: He is alert and oriented to person, place, and time

## 2021-08-01 DIAGNOSIS — E11.3293 TYPE 2 DIABETES MELLITUS WITH MILD NONPROLIFERATIVE RETINOPATHY OF BOTH EYES, WITHOUT LONG-TERM CURRENT USE OF INSULIN, MACULAR EDEMA PRESENCE UNSPECIFIED (HCC): ICD-10-CM

## 2021-08-02 RX ORDER — DULAGLUTIDE 0.75 MG/.5ML
0.75 INJECTION, SOLUTION SUBCUTANEOUS WEEKLY
Qty: 2 ML | Refills: 2 | Status: SHIPPED | OUTPATIENT
Start: 2021-08-02 | End: 2021-10-21

## 2021-09-27 DIAGNOSIS — E11.65 POORLY CONTROLLED DIABETES MELLITUS (HCC): ICD-10-CM

## 2021-10-21 DIAGNOSIS — E11.3293 TYPE 2 DIABETES MELLITUS WITH MILD NONPROLIFERATIVE RETINOPATHY OF BOTH EYES, WITHOUT LONG-TERM CURRENT USE OF INSULIN, MACULAR EDEMA PRESENCE UNSPECIFIED (HCC): ICD-10-CM

## 2021-10-21 RX ORDER — DULAGLUTIDE 0.75 MG/.5ML
0.75 INJECTION, SOLUTION SUBCUTANEOUS WEEKLY
Qty: 0.5 ML | Refills: 2 | Status: SHIPPED | OUTPATIENT
Start: 2021-10-21 | End: 2021-11-19 | Stop reason: ALTCHOICE

## 2021-11-19 ENCOUNTER — OFFICE VISIT (OUTPATIENT)
Dept: FAMILY MEDICINE CLINIC | Facility: CLINIC | Age: 61
End: 2021-11-19
Payer: COMMERCIAL

## 2021-11-19 VITALS
HEART RATE: 104 BPM | OXYGEN SATURATION: 99 % | RESPIRATION RATE: 18 BRPM | SYSTOLIC BLOOD PRESSURE: 130 MMHG | HEIGHT: 70 IN | BODY MASS INDEX: 32.93 KG/M2 | DIASTOLIC BLOOD PRESSURE: 76 MMHG | WEIGHT: 230 LBS

## 2021-11-19 DIAGNOSIS — B35.1 ONYCHOMYCOSIS: ICD-10-CM

## 2021-11-19 DIAGNOSIS — Z23 FLU VACCINE NEED: ICD-10-CM

## 2021-11-19 DIAGNOSIS — E66.9 OBESITY (BMI 30-39.9): ICD-10-CM

## 2021-11-19 DIAGNOSIS — E11.3293 TYPE 2 DIABETES MELLITUS WITH MILD NONPROLIFERATIVE RETINOPATHY OF BOTH EYES, WITHOUT LONG-TERM CURRENT USE OF INSULIN, MACULAR EDEMA PRESENCE UNSPECIFIED (HCC): Primary | ICD-10-CM

## 2021-11-19 DIAGNOSIS — Z86.79 HISTORY OF HYPERTENSION: ICD-10-CM

## 2021-11-19 LAB — SL AMB POCT HEMOGLOBIN AIC: 8.3 (ref ?–6.5)

## 2021-11-19 PROCEDURE — 90471 IMMUNIZATION ADMIN: CPT | Performed by: INTERNAL MEDICINE

## 2021-11-19 PROCEDURE — 3725F SCREEN DEPRESSION PERFORMED: CPT | Performed by: INTERNAL MEDICINE

## 2021-11-19 PROCEDURE — 1036F TOBACCO NON-USER: CPT | Performed by: INTERNAL MEDICINE

## 2021-11-19 PROCEDURE — 3052F HG A1C>EQUAL 8.0%<EQUAL 9.0%: CPT | Performed by: INTERNAL MEDICINE

## 2021-11-19 PROCEDURE — 99214 OFFICE O/P EST MOD 30 MIN: CPT | Performed by: INTERNAL MEDICINE

## 2021-11-19 PROCEDURE — 83036 HEMOGLOBIN GLYCOSYLATED A1C: CPT | Performed by: INTERNAL MEDICINE

## 2021-11-19 PROCEDURE — 90682 RIV4 VACC RECOMBINANT DNA IM: CPT | Performed by: INTERNAL MEDICINE

## 2021-11-19 PROCEDURE — 3008F BODY MASS INDEX DOCD: CPT | Performed by: INTERNAL MEDICINE

## 2021-11-19 RX ORDER — DULAGLUTIDE 3 MG/.5ML
3 INJECTION, SOLUTION SUBCUTANEOUS WEEKLY
Qty: 2 ML | Refills: 3 | Status: SHIPPED | OUTPATIENT
Start: 2021-11-19 | End: 2022-03-20

## 2021-11-22 ENCOUNTER — TELEPHONE (OUTPATIENT)
Dept: ADMINISTRATIVE | Facility: OTHER | Age: 61
End: 2021-11-22

## 2021-12-21 DIAGNOSIS — E11.65 POORLY CONTROLLED DIABETES MELLITUS (HCC): ICD-10-CM

## 2022-01-25 DIAGNOSIS — E11.3293 TYPE 2 DIABETES MELLITUS WITH MILD NONPROLIFERATIVE RETINOPATHY OF BOTH EYES, WITHOUT LONG-TERM CURRENT USE OF INSULIN, MACULAR EDEMA PRESENCE UNSPECIFIED (HCC): ICD-10-CM

## 2022-01-25 RX ORDER — EMPAGLIFLOZIN 25 MG/1
TABLET, FILM COATED ORAL
Qty: 90 TABLET | Refills: 0 | Status: SHIPPED | OUTPATIENT
Start: 2022-01-25 | End: 2022-04-24

## 2022-03-10 ENCOUNTER — TELEPHONE (OUTPATIENT)
Dept: FAMILY MEDICINE CLINIC | Facility: CLINIC | Age: 62
End: 2022-03-10

## 2022-03-31 DIAGNOSIS — E11.65 POORLY CONTROLLED DIABETES MELLITUS (HCC): ICD-10-CM

## 2022-04-01 NOTE — TELEPHONE ENCOUNTER
Patient is overdue for DM follow-up  (He canceled his February appointment and never rescheduled ) Please call him to set up an appointment

## 2022-04-22 LAB
LEFT EYE DIABETIC RETINOPATHY: NORMAL
RIGHT EYE DIABETIC RETINOPATHY: NORMAL
SEVERITY (EYE EXAM): NORMAL

## 2022-05-23 ENCOUNTER — RA CDI HCC (OUTPATIENT)
Dept: OTHER | Facility: HOSPITAL | Age: 62
End: 2022-05-23

## 2022-05-23 NOTE — PROGRESS NOTES
[facility]  coding opportunities          Chart Reviewed number of suggestions sent to Provider: 2   E11 42 See podiatry note    E11 65 See 11/21 labs A1c 8 3    Patients Insurance        Commercial Insurance: [employer]

## 2022-05-27 ENCOUNTER — OFFICE VISIT (OUTPATIENT)
Dept: FAMILY MEDICINE CLINIC | Facility: CLINIC | Age: 62
End: 2022-05-27
Payer: COMMERCIAL

## 2022-05-27 VITALS
WEIGHT: 229 LBS | TEMPERATURE: 97.1 F | OXYGEN SATURATION: 98 % | HEIGHT: 70 IN | DIASTOLIC BLOOD PRESSURE: 80 MMHG | HEART RATE: 97 BPM | BODY MASS INDEX: 32.78 KG/M2 | SYSTOLIC BLOOD PRESSURE: 130 MMHG

## 2022-05-27 DIAGNOSIS — E11.65 POORLY CONTROLLED DIABETES MELLITUS (HCC): ICD-10-CM

## 2022-05-27 DIAGNOSIS — J30.89 ENVIRONMENTAL AND SEASONAL ALLERGIES: ICD-10-CM

## 2022-05-27 DIAGNOSIS — E66.9 OBESITY (BMI 30-39.9): ICD-10-CM

## 2022-05-27 DIAGNOSIS — E65 PANNUS, ABDOMINAL: ICD-10-CM

## 2022-05-27 DIAGNOSIS — G47.30 SLEEP APNEA, UNSPECIFIED TYPE: ICD-10-CM

## 2022-05-27 DIAGNOSIS — E78.5 HYPERLIPIDEMIA, UNSPECIFIED HYPERLIPIDEMIA TYPE: ICD-10-CM

## 2022-05-27 DIAGNOSIS — Z23 NEED FOR PNEUMOCOCCAL VACCINATION: ICD-10-CM

## 2022-05-27 DIAGNOSIS — E11.3293 TYPE 2 DIABETES MELLITUS WITH MILD NONPROLIFERATIVE RETINOPATHY OF BOTH EYES, WITHOUT LONG-TERM CURRENT USE OF INSULIN, MACULAR EDEMA PRESENCE UNSPECIFIED (HCC): ICD-10-CM

## 2022-05-27 DIAGNOSIS — D64.9 ANEMIA, UNSPECIFIED TYPE: ICD-10-CM

## 2022-05-27 DIAGNOSIS — Z12.11 ENCOUNTER FOR COLORECTAL CANCER SCREENING: ICD-10-CM

## 2022-05-27 DIAGNOSIS — E11.3293 TYPE 2 DIABETES MELLITUS WITH MILD NONPROLIFERATIVE RETINOPATHY OF BOTH EYES, WITHOUT LONG-TERM CURRENT USE OF INSULIN, MACULAR EDEMA PRESENCE UNSPECIFIED (HCC): Primary | ICD-10-CM

## 2022-05-27 DIAGNOSIS — Z12.12 ENCOUNTER FOR COLORECTAL CANCER SCREENING: ICD-10-CM

## 2022-05-27 LAB
CREAT UR-MCNC: 51.9 MG/DL
MICROALBUMIN UR-MCNC: 7.3 MG/L (ref 0–20)
MICROALBUMIN/CREAT 24H UR: 14 MG/G CREATININE (ref 0–30)
SL AMB POCT HEMOGLOBIN AIC: 8 (ref ?–6.5)

## 2022-05-27 PROCEDURE — 3052F HG A1C>EQUAL 8.0%<EQUAL 9.0%: CPT | Performed by: PHYSICIAN ASSISTANT

## 2022-05-27 PROCEDURE — 82570 ASSAY OF URINE CREATININE: CPT | Performed by: PHYSICIAN ASSISTANT

## 2022-05-27 PROCEDURE — 90471 IMMUNIZATION ADMIN: CPT

## 2022-05-27 PROCEDURE — 1036F TOBACCO NON-USER: CPT | Performed by: PHYSICIAN ASSISTANT

## 2022-05-27 PROCEDURE — 3725F SCREEN DEPRESSION PERFORMED: CPT | Performed by: PHYSICIAN ASSISTANT

## 2022-05-27 PROCEDURE — 3008F BODY MASS INDEX DOCD: CPT | Performed by: PHYSICIAN ASSISTANT

## 2022-05-27 PROCEDURE — 82043 UR ALBUMIN QUANTITATIVE: CPT | Performed by: PHYSICIAN ASSISTANT

## 2022-05-27 PROCEDURE — 3061F NEG MICROALBUMINURIA REV: CPT | Performed by: PHYSICIAN ASSISTANT

## 2022-05-27 PROCEDURE — 83036 HEMOGLOBIN GLYCOSYLATED A1C: CPT | Performed by: PHYSICIAN ASSISTANT

## 2022-05-27 PROCEDURE — 90677 PCV20 VACCINE IM: CPT

## 2022-05-27 PROCEDURE — 99214 OFFICE O/P EST MOD 30 MIN: CPT | Performed by: PHYSICIAN ASSISTANT

## 2022-05-27 RX ORDER — DULAGLUTIDE 4.5 MG/.5ML
4.5 INJECTION, SOLUTION SUBCUTANEOUS WEEKLY
Qty: 2 ML | Refills: 5 | Status: SHIPPED | OUTPATIENT
Start: 2022-05-27

## 2022-05-27 RX ORDER — FLUTICASONE PROPIONATE 50 MCG
2 SPRAY, SUSPENSION (ML) NASAL DAILY PRN
Qty: 16 G | Refills: 5 | Status: SHIPPED | OUTPATIENT
Start: 2022-05-27 | End: 2022-06-23

## 2022-05-27 NOTE — ASSESSMENT & PLAN NOTE
Slightly improved but uncontrolled  Patient will continue metformin 1000 mg twice daily and Jardiance 25 mg daily  Will increase Trulicity to 4 5 mg weekly  Recheck A1c at next visit  Update other labs in the interim  Encouraged to limit intake of carbohydrates  Patient is up-to-date on foot exam   He recently updated his eye exam   Follow-up in 3 months    Lab Results   Component Value Date    HGBA1C 8 0 (A) 05/27/2022

## 2022-05-27 NOTE — PROGRESS NOTES
FAMILY PRACTICE OFFICE VISIT  [facility]       NAME: Faustino De La Cruz  AGE: 60 y o  SEX: male       : 1960        MRN: [id number]    DATE: 2022  TIME: 5:50 PM    Assessment and Plan     Problem List Items Addressed This Visit        Endocrine    Type 2 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, without long-term current use of insulin ([address] ) - Primary     Slightly improved but uncontrolled  Patient will continue metformin 1000 mg twice daily and Jardiance 25 mg daily  Will increase Trulicity to 4 5 mg weekly  Recheck A1c at next visit  Update other labs in the interim  Encouraged to limit intake of carbohydrates  Patient is up-to-date on foot exam   He recently updated his eye exam   Follow-up in 3 months  Lab Results   Component Value Date    HGBA1C 8 0 (A) 2022              Relevant Medications    Dulaglutide (Trulicity) 4 5 TI/6 5GL SOPN    metFORMIN (GLUCOPHAGE) 1000 MG tablet    Other Relevant Orders    POCT hemoglobin A1c (Completed)    Microalbumin / creatinine urine ratio (Completed)    CBC and differential    Comprehensive metabolic panel    Lipid Panel with Direct LDL reflex    TSH, 3rd generation with Free T4 reflex       Respiratory    Sleep apnea     Stable  Continue regular CPAP use  Other    Environmental and seasonal allergies     Patient is experiencing swelling of the turbinates/congestion  He was encouraged to start using Flonase 2 sprays per nostril daily  We reviewed that this is the a deal treatment for long-term use  Afrin should be limited to 3-5 days  Call with any concerns such as worsening symptoms or failure to improve  Relevant Medications    fluticasone (FLONASE) 50 mcg/act nasal spray    Hyperlipidemia     Not currently on a statin  Recheck lipid panel  Relevant Orders    Lipid Panel with Direct LDL reflex    Obesity (BMI 30-39  9)     BMI Counseling: Body mass index is 32 86 kg/m²  The BMI is above normal  Nutrition recommendations include reducing portion sizes and moderation in carbohydrate intake  Exercise recommendations include exercising 3-5 times per week  Pannus, abdominal     Patient is pursuing a panniculectomy  He does report having recurrent trouble with rashes in this area and interference with his cycling for exercise  Anemia     Recheck with labs  Other Visit Diagnoses     Encounter for colorectal cancer screening        Relevant Orders    Ambulatory Referral to Gastroenterology    Poorly controlled diabetes mellitus ([facility] )        Relevant Medications    Dulaglutide (Trulicity) 4 5 OS/0 9ML SOPN    metFORMIN (GLUCOPHAGE) 1000 MG tablet    Need for pneumococcal vaccination        Relevant Orders    Pneumococcal Conjugate Vaccine 20-valent (Pcv20) (Completed)          Type 2 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, without long-term current use of insulin ([facility] )  Slightly improved but uncontrolled  Patient will continue metformin 1000 mg twice daily and Jardiance 25 mg daily  Will increase Trulicity to 4 5 mg weekly  Recheck A1c at next visit  Update other labs in the interim  Encouraged to limit intake of carbohydrates  Patient is up-to-date on foot exam   He recently updated his eye exam   Follow-up in 3 months  Lab Results   Component Value Date    HGBA1C 8 0 (A) 05/27/2022       Sleep apnea  Stable  Continue regular CPAP use  Environmental and seasonal allergies  Patient is experiencing swelling of the turbinates/congestion  He was encouraged to start using Flonase 2 sprays per nostril daily  We reviewed that this is the a deal treatment for long-term use  Afrin should be limited to 3-5 days  Call with any concerns such as worsening symptoms or failure to improve  Hyperlipidemia  Not currently on a statin  Recheck lipid panel      Obesity (BMI 30-39  9)  BMI Counseling: Body mass index is 32 86 kg/m²  The BMI is above normal  Nutrition recommendations include reducing portion sizes and moderation in carbohydrate intake  Exercise recommendations include exercising 3-5 times per week  Pannus, abdominal  Patient is pursuing a panniculectomy  He does report having recurrent trouble with rashes in this area and interference with his cycling for exercise  Anemia  Recheck with labs  Chief Complaint     Chief Complaint   Patient presents with    Follow-up     DM follow up       History of Present Illness   Harleen Elliott is a 60y o -year-old male who presents for follow-up on chronic medical conditions  The patient presents today for follow-up on diabetes  At the last visit, A1c was 8 3%  The patient reports that current diabetic medications include Jardiance 25 mg daily, metformin 1000 mg twice daily, and Trulicity 3 mg weekly  Blood sugars are not checked at home  The patient denies hypoglycemic episodes  Last eye exam was 10/2020 - per records but states he went last month  Last foot exam was 1/2022  Today's A1c in the office is 8 0%  The patient has a history of vitamin-D deficiency and is currently taking 1000 units daily  Last vitamin-D level was 26 last year  The patient feels that sleep apnea has been stable  Regular CPAP use is confirmed  The patient denies excessive daytime sleepiness, known apneas, or nonrestorative sleep  The patient is not on a statin  Last LDL was 80 in February 2021  Since the patent's last visit, weight has stable  Diet is reported to be fair  Exercise occurs with cycling  Patient notes that he would like to have panniculectomy  He reports that he has rashes under the abdomen  He reports that it interferes with his cycling as well       He reports trouble with sinuses - he has had trouble with allergies this year but gets concerned about using the Afrin too much - not using the Flonase lately  Review of Systems   Review of Systems   Constitutional: Negative for chills and fever  HENT: Positive for congestion  Respiratory: Negative for shortness of breath  Cardiovascular: Negative for chest pain, palpitations and leg swelling  Skin: Positive for rash  Neurological: Negative for dizziness and headaches  Active Problem List     Patient Active Problem List   Diagnosis    Asthma    Basal cell carcinoma of skin    Environmental and seasonal allergies    Hyperlipidemia    History of hypertension    Obesity (BMI 30-39  9)    Onychomycosis of toenail    Pannus, abdominal    Type 2 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, without long-term current use of insulin (HCC)    Sciatica of left side    Sleep apnea    Bilateral impacted cerumen    Anemia    Decreased pulse         Past Medical History:  Past Medical History:   Diagnosis Date    Diabetes mellitus ([facility] )        Past Surgical History:  Past Surgical History:   Procedure Laterality Date    EAR SURGERY Right 2014    Basal cancer removal    GASTRIC BYPASS         Family History:  Family History   Problem Relation Age of Onset    Diabetes Family     Hypertension Family     Heart attack Family        Social History:  Social History     Socioeconomic History    Marital status: /Civil Union     Spouse name: Not on file    Number of children: Not on file    Years of education: Not on file    Highest education level: Not on file   Occupational History    Not on file   Tobacco Use    Smoking status: Former Smoker     Quit date: 1977     Years since quittin 4    Smokeless tobacco: Never Used   Vaping Use    Vaping Use: Never used   Substance and Sexual Activity    Alcohol use: Yes     Comment: Social     Drug use: No    Sexual activity: Not on file   Other Topics Concern    Not on file   Social History Narrative    Not on file     Social Determinants of Health     Financial Resource Strain: Not on file   Food Insecurity: Not on file   Transportation Needs: Not on file   Physical Activity: Not on file   Stress: Not on file   Social Connections: Not on file   Intimate Partner Violence: Not on file   Housing Stability: Not on file       Objective     Vitals:    05/27/22 1042   BP: 130/80   BP Location: Left arm   Patient Position: Sitting   Cuff Size: Large   Pulse: 97   Temp: (!) 97 1 °F (36 2 °C)   TempSrc: Tympanic   SpO2: 98%   Weight: 104 kg (229 lb)   Height: 5' 10" (1 778 m)     Wt Readings from Last 3 Encounters:   05/27/22 104 kg (229 lb)   11/19/21 104 kg (230 lb)   07/22/21 104 kg (230 lb 3 2 oz)       Physical Exam  Vitals reviewed  Constitutional:       General: He is not in acute distress  Appearance: Normal appearance  He is well-developed  He is obese  He is not ill-appearing  HENT:      Head: Normocephalic and atraumatic  Right Ear: Tympanic membrane, ear canal and external ear normal       Left Ear: Tympanic membrane, ear canal and external ear normal       Nose: Congestion (Swollen turbinates bilaterally) present  Neck:      Thyroid: No thyromegaly  Cardiovascular:      Rate and Rhythm: Normal rate and regular rhythm  Pulses: Normal pulses  Heart sounds: Normal heart sounds  No murmur heard  Comments: No carotid bruits noted  Pulmonary:      Effort: Pulmonary effort is normal       Breath sounds: Normal breath sounds  No wheezing, rhonchi or rales  Musculoskeletal:      Cervical back: Neck supple  Right lower leg: No edema  Left lower leg: No edema  Lymphadenopathy:      Cervical: No cervical adenopathy  Skin:     General: Skin is warm and dry  Findings: No rash  Neurological:      Mental Status: He is alert  Psychiatric:         Mood and Affect: Mood normal          Behavior: Behavior normal          Thought Content:  Thought content normal          Judgment: Judgment normal  Pertinent Laboratory/Diagnostic Studies:  Lab Results   Component Value Date    BUN 14 02/13/2021    CREATININE 0 79 02/13/2021    CALCIUM 9 3 02/13/2021     11/18/2017    K 4 0 02/13/2021    CO2 28 02/13/2021     02/13/2021     Lab Results   Component Value Date    ALT 8 (L) 02/13/2021    AST 10 02/13/2021    ALKPHOS 57 02/13/2021    BILITOT 0 7 11/18/2017       Lab Results   Component Value Date    WBC 7 7 02/13/2021    HGB 12 0 (L) 02/13/2021    HCT 38 4 (L) 02/13/2021    MCV 82 1 02/13/2021     02/13/2021     Lab Results   Component Value Date    CHOL 133 11/18/2017     Lab Results   Component Value Date    TRIG 109 02/13/2021     Lab Results   Component Value Date    HDL 42 02/13/2021     Lab Results   Component Value Date    LDLCALC 80 02/13/2021     Lab Results   Component Value Date    HGBA1C 8 0 (A) 05/27/2022       Results for orders placed or performed in visit on 05/27/22   Microalbumin / creatinine urine ratio   Result Value Ref Range    Creatinine, Ur 51 9 mg/dL    Microalbum  ,U,Random 7 3 0 0 - 20 0 mg/L    Microalb Creat Ratio 14 0 - 30 mg/g creatinine   POCT hemoglobin A1c   Result Value Ref Range    Hemoglobin A1C 8 0 (A) 6 5         ALLERGIES:  No Known Allergies    Current Medications     Current Outpatient Medications   Medication Sig Dispense Refill    Dulaglutide (Trulicity) 4 5 RU/6 8BS SOPN Inject 0 5 mL (4 5 mg total) under the skin once a week 2 mL 5    fluticasone (FLONASE) 50 mcg/act nasal spray 2 sprays into each nostril daily as needed for allergies 16 g 5    Jardiance 25 MG TABS TAKE 1 TABLET BY MOUTH EVERY DAY IN THE MORNING 90 tablet 0    metFORMIN (GLUCOPHAGE) 1000 MG tablet Take 1 tablet (1,000 mg total) by mouth 2 (two) times a day 180 tablet 1    Multiple Vitamin (MULTI-DAY) TABS Take by mouth daily      cholecalciferol (VITAMIN D3) 1,000 units tablet Take 1 tablet (1,000 Units total) by mouth daily Start after done with the high-dose   90 tablet 3     No current facility-administered medications for this visit           Health Maintenance     Health Maintenance   Topic Date Due    DM Eye Exam  10/30/2021    Annual Physical  02/12/2022    Colorectal Cancer Screening  03/24/2022    COVID-19 Vaccine (4 - Booster for Pfizer series) 04/22/2022    HEMOGLOBIN A1C  08/27/2022    HIV Screening  06/27/2022 (Originally 5/22/1975)    Diabetic Foot Exam  01/21/2023    Depression Screening  05/27/2023    BMI: Followup Plan  05/27/2023    BMI: Adult  05/27/2023    URINE MICROALBUMIN  05/27/2023    DTaP,Tdap,and Td Vaccines (2 - Td or Tdap) 09/18/2030    Hepatitis C Screening  Completed    Pneumococcal Vaccine: Pediatrics (0 to 5 Years) and At-Risk Patients (6 to 59 Years)  Completed    Influenza Vaccine  Completed    HIB Vaccine  Aged Out    Hepatitis B Vaccine  Aged Out    IPV Vaccine  Aged Out    Hepatitis A Vaccine  Aged Out    Meningococcal ACWY Vaccine  Aged Out    HPV Vaccine  Aged Out     Immunization History   Administered Date(s) Administered    COVID-19 PFIZER VACCINE 0 3 ML IM 02/19/2021, 03/12/2021, 12/22/2021    INFLUENZA 11/19/2021    Influenza Quadrivalent Preservative Free 3 years and older IM 10/03/2014, 11/25/2016, 12/13/2017    Influenza, recombinant, quadrivalent,injectable, preservative free 01/18/2019, 09/16/2019, 09/18/2020, 11/19/2021    Influenza, seasonal, injectable 01/30/2013, 11/29/2013    Pneumococcal Conjugate Vaccine 20-valent (Pcv20), Polysace 05/27/2022    Pneumococcal Polysaccharide PPV23 01/18/2019    Tdap 09/18/2020       Nicole Barriga PA-C  5/27/2022 5:50 PM  [facility]

## 2022-05-27 NOTE — ASSESSMENT & PLAN NOTE
Patient is pursuing a panniculectomy  He does report having recurrent trouble with rashes in this area and interference with his cycling for exercise

## 2022-05-27 NOTE — ASSESSMENT & PLAN NOTE
Patient is experiencing swelling of the turbinates/congestion  He was encouraged to start using Flonase 2 sprays per nostril daily  We reviewed that this is the a deal treatment for long-term use  Afrin should be limited to 3-5 days  Call with any concerns such as worsening symptoms or failure to improve

## 2022-05-27 NOTE — ASSESSMENT & PLAN NOTE
BMI Counseling: Body mass index is 32 86 kg/m²  The BMI is above normal  Nutrition recommendations include reducing portion sizes and moderation in carbohydrate intake  Exercise recommendations include exercising 3-5 times per week

## 2022-06-01 ENCOUNTER — TELEPHONE (OUTPATIENT)
Dept: ADMINISTRATIVE | Facility: OTHER | Age: 62
End: 2022-06-01

## 2022-06-01 NOTE — TELEPHONE ENCOUNTER
----- Message from Niall Jean-Baptiste MA sent at 5/27/2022 11:04 AM EDT -----  Regarding: DM EYE EXAM  Hi ,  Please updated patient HM as he had his DM Eye done with Winnie Barragan DO at [facility])  [address] [place] [address], [address] [place], [place] [address]  Amrita Thrasher [address]  [facility]: [phone]  Fax: [phone]  Thanks

## 2022-06-01 NOTE — LETTER
Diabetic Eye Exam Form    Date Requested: 22  Patient: Sheridan Godinez  Patient : 1960   Referring Provider: Abdi Anguiano PA-C    DIABETIC Eye Exam Date _______________________________    Type of Exam MUST be documented for Diabetic Eye Exams  Please CHECK ONE  Retinal Exam       Dilated Retinal Exam       OCT       Optomap-Iris Exam      Fundus Photography     Left Eye - Please check Retinopathy AND Type or No Retinopathy      Exam did show retinopathy    Exam did not show retinopathy         Mild     Proliferative           Moderate    Severe            None         Right Eye - Please check Retinopathy AND Type or No Retinopathy     Exam did show retinopathy    Exam did not show retinopathy         Mild     Proliferative        Moderate    Severe        None       Comments __________________________________________________________    Practice Providing Exam ______________________________________________    Exam Performed By (print name) _______________________________________      Provider Signature ___________________________________________________    These reports are needed for  compliance  Please fax this completed form and a copy of the Diabetic Eye Exam report to our office located at [place] [address] as soon as possible via [phone] attention Oneta Epley: Phone [phone]  We thank you for your assistance in treating our mutual patient

## 2022-06-01 NOTE — TELEPHONE ENCOUNTER
Upon review of the In Basket request and the patient's chart, initial outreach has been made via fax, please see Contacts section for details       Thank you  Sundar Meals

## 2022-06-03 NOTE — TELEPHONE ENCOUNTER
Upon review of the In Basket request we were able to locate, review, and update the patient chart as requested for Diabetic Eye Exam     Any additional questions or concerns should be emailed to the Practice Liaisons via [email or web address] email, please do not reply via In Basket      Thank you  Tammy Blackmon

## 2022-06-23 DIAGNOSIS — J30.89 ENVIRONMENTAL AND SEASONAL ALLERGIES: ICD-10-CM

## 2022-06-23 RX ORDER — FLUTICASONE PROPIONATE 50 MCG
SPRAY, SUSPENSION (ML) NASAL
Qty: 48 ML | Refills: 2 | Status: SHIPPED | OUTPATIENT
Start: 2022-06-23

## 2022-07-23 DIAGNOSIS — E11.3293 TYPE 2 DIABETES MELLITUS WITH MILD NONPROLIFERATIVE RETINOPATHY OF BOTH EYES, WITHOUT LONG-TERM CURRENT USE OF INSULIN, MACULAR EDEMA PRESENCE UNSPECIFIED (HCC): ICD-10-CM

## 2022-07-23 RX ORDER — EMPAGLIFLOZIN 25 MG/1
TABLET, FILM COATED ORAL
Qty: 90 TABLET | Refills: 0 | Status: SHIPPED | OUTPATIENT
Start: 2022-07-23 | End: 2022-10-23

## 2022-08-29 DIAGNOSIS — E11.3293 TYPE 2 DIABETES MELLITUS WITH MILD NONPROLIFERATIVE RETINOPATHY OF BOTH EYES, WITHOUT LONG-TERM CURRENT USE OF INSULIN, MACULAR EDEMA PRESENCE UNSPECIFIED (HCC): ICD-10-CM

## 2022-08-30 RX ORDER — SEMAGLUTIDE 2.68 MG/ML
2 INJECTION, SOLUTION SUBCUTANEOUS WEEKLY
Qty: 3 ML | Refills: 3 | Status: SHIPPED | OUTPATIENT
Start: 2022-08-30 | End: 2022-09-03

## 2022-08-30 NOTE — TELEPHONE ENCOUNTER
Covering for [facility]- called pharmacy to confirm that they cannot order in [address]  The are sparingly able to get in [address] but this medication has also been experiencing back orders  [facility] said they ordered it but they will see if their supplier actually ships it in  They confirmed that they will contact patient and office if unable to fill order for either Trulicity or Ozempic  I called patient to discuss change from Trulicity to ozempic  Discussed different in pen design for example he must attach needles and each pen has multiple doses, 4 total  Patient will contact office if he does not hear back from pharmacy by this Thursday  Last injection was Friday 8/26/22  Jooa Rubio      Pharmacist Tracking Tool  Reason For Outreach: Embedded Pharmacist  Demographics:  Intervention Method: Phone  Type of Intervention: New  Topics Addressed: Diabetes  Pharmacologic Interventions: N/A  Non-Pharmacologic Interventions: Medication/Device education  Time:  Direct Patient Care: 10 mins  Care Coordination: 10 mins  Recommendation Recipient: Patient/Caregiver  Outcome: Accepted

## 2022-08-30 NOTE — TELEPHONE ENCOUNTER
Please let patient know that since the pharmacy informed us that Trulicity is on backorder, a script has been sent to replace it with Ozempic  This will still be used once weekly  Please encourage patient to reach out with any questions or concerns  Hector Crowe, can you please follow-up with this patient to confirm he does okay with switching medications/devices?

## 2022-09-19 ENCOUNTER — RA CDI HCC (OUTPATIENT)
Dept: OTHER | Facility: HOSPITAL | Age: 62
End: 2022-09-19

## 2022-09-19 NOTE — PROGRESS NOTES
[facility]  coding opportunities          Chart Reviewed number of suggestions sent to Provider: 3     E11 42  E11 51 see 9/2/22 podiatry exam  [id number] on problem list      Patients Insurance        Commercial Insurance: [employer]

## 2022-09-22 NOTE — PROGRESS NOTES
FAMILY PRACTICE OFFICE VISIT  [facility] - [facility] PRIMARY CARE       NAME: Bobo Viera  AGE: 60 y o  SEX: male       : 1960        MRN: [id number]    DATE: 2022  TIME: 12:20 AM    Assessment and Plan     Problem List Items Addressed This Visit        Endocrine    Type 2 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, without long-term current use of insulin ([address] ) - Primary     Currently on metformin 1000 mg twice daily, Jardiance 25 mg daily and Trulicity 4 5 mg weekly  He states that he would like to switch to Ozempic in hopes of improving his blood sugar control  Script was sent to pharmacy  He is up-to-date on his eye exam and foot exam   He will go for his blood work as previously ordered  Lab Results   Component Value Date    HGBA1C 8 0 (A) 2022            Relevant Medications    Semaglutide, 2 MG/DOSE, (Ozempic, 2 MG/DOSE,) 8 MG/3ML SOPN    Other Relevant Orders    Hemoglobin A1C       Respiratory    Asthma     No recent symptoms  No inhalers needed  Monitor for recurrence of symptoms  Sleep apnea     Controlled  Continue regular CPAP use  Will continue to monitor  Other    Hyperlipidemia     Not on a statin  Recheck labs as previously ordered  Obesity (BMI 30-39  9)     BMI Counseling: Body mass index is 32 17 kg/m²  The BMI is above normal  Nutrition recommendations include reducing portion sizes and moderation in carbohydrate intake  Exercise recommendations include exercising 3-5 times per week  Pannus, abdominal     Patient will be pursuing a panniculectomy  He states that he needs a letter in a test ordered by us before he can get the surgery done  Staff was asked to call her clarification  Vitamin D deficiency     Currently on 1000 units daily of vitamin-D  Recheck level           Relevant Orders    Vitamin D 25 hydroxy      Other Visit Diagnoses     Lateral epicondylitis of left elbow        Encouraged to wear tennis elbow strap in the area  Call if worsens or persists  Relevant Orders    Tennis elbow strap    Influenza vaccine needed        Relevant Orders    influenza vaccine, quadrivalent, recombinant, PF, 0 5 mL, for patients 18 yr+ (FLUBLOK) (Completed)          Type 2 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, without long-term current use of insulin (HCC)  Currently on metformin 1000 mg twice daily, Jardiance 25 mg daily and Trulicity 4 5 mg weekly  He states that he would like to switch to Ozempic in hopes of improving his blood sugar control  Script was sent to pharmacy  He is up-to-date on his eye exam and foot exam   He will go for his blood work as previously ordered  Lab Results   Component Value Date    HGBA1C 8 0 (A) 05/27/2022       Asthma  No recent symptoms  No inhalers needed  Monitor for recurrence of symptoms  Sleep apnea  Controlled  Continue regular CPAP use  Will continue to monitor  Hyperlipidemia  Not on a statin  Recheck labs as previously ordered  Obesity (BMI 30-39  9)  BMI Counseling: Body mass index is 32 17 kg/m²  The BMI is above normal  Nutrition recommendations include reducing portion sizes and moderation in carbohydrate intake  Exercise recommendations include exercising 3-5 times per week  Pannus, abdominal  Patient will be pursuing a panniculectomy  He states that he needs a letter in a test ordered by us before he can get the surgery done  Staff was asked to call her clarification  Vitamin D deficiency  Currently on 1000 units daily of vitamin-D  Recheck level  Chief Complaint     Chief Complaint   Patient presents with    Follow-up     3 months follow up       History of Present Illness   Mario Claudio is a 60y o -year-old male who presents for 3 month follow-up on chronic conditions  The patient presents today for follow-up on diabetes    At the last visit, A1c was 8% on 5/27/22  The patient reports that current diabetic medications include metformin 1000 mg twice daily, Jardiance 25 mg daily, and Trulicity 4 5 mg weekly (but wants to switch to Ozempic)  He denies checking his blood sugar at night  Last eye exam was 4/2022  Last foot exam was 1/2022  Patient was nervous to have his A1c checked today due to concerns about cost     The patient feels that sleep apnea has been controlled  Regular CPAP use is confirmed  The patient denies excessive daytime sleepiness, known apneas, or nonrestorative sleep  The patient is not on a statin  Last LDL was 80 in 2/2021  The patient denies limiting fatty food  Since the patent's last visit, weight has decreased 5 pounds since last visit  Diet is reported to be improving - less carbs  Patient notes that he is still hoping to get a panniculectomy - notes that he is getting rashes under the pannus still     The patient has a history of vitamin-D deficiency and is currently taking a daily MV  Last vitamin-D level was 26 in 2/2021  He reports pain in the left elbow         Review of Systems   Review of Systems   Constitutional: Negative for chills and fever  Respiratory: Negative for shortness of breath  Cardiovascular: Negative for chest pain, palpitations and leg swelling  Musculoskeletal: Positive for arthralgias  Neurological: Negative for dizziness, syncope and headaches  Active Problem List     Patient Active Problem List   Diagnosis    Asthma    Basal cell carcinoma of skin    Environmental and seasonal allergies    Hyperlipidemia    History of hypertension    Obesity (BMI 30-39  9)    Onychomycosis of toenail    Pannus, abdominal    Type 2 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, without long-term current use of insulin (HCC)    Sciatica of left side    Sleep apnea    Bilateral impacted cerumen    Anemia    Decreased pulse    Vitamin D deficiency         Past Medical History:  Past Medical History:   Diagnosis Date    Diabetes mellitus ([address] )        Past Surgical History:  Past Surgical History:   Procedure Laterality Date    EAR SURGERY Right 2014    Basal cancer removal    GASTRIC BYPASS         Family History:  Family History   Problem Relation Age of Onset    Diabetes Family     Hypertension Family     Heart attack Family        Social History:  Social History     Socioeconomic History    Marital status: /Civil Union     Spouse name: Not on file    Number of children: Not on file    Years of education: Not on file    Highest education level: Not on file   Occupational History    Not on file   Tobacco Use    Smoking status: Former Smoker     Quit date: 1977     Years since quittin 7    Smokeless tobacco: Never Used   Vaping Use    Vaping Use: Never used   Substance and Sexual Activity    Alcohol use: Yes     Comment: Social     Drug use: No    Sexual activity: Not on file   Other Topics Concern    Not on file   Social History Narrative    Not on file     Social Determinants of Health     Financial Resource Strain: Not on file   Food Insecurity: Not on file   Transportation Needs: Not on file   Physical Activity: Not on file   Stress: Not on file   Social Connections: Not on file   Intimate Partner Violence: Not on file   Housing Stability: Not on file       Objective     Vitals:    22 1100   BP: 118/80   BP Location: Left arm   Cuff Size: Standard   Pulse: 78   Temp: (!) 97 4 °F (36 3 °C)   TempSrc: Tympanic   SpO2: 98%   Weight: 102 kg (224 lb 3 2 oz)   Height: 5' 10" (1 778 m)     Wt Readings from Last 3 Encounters:   22 102 kg (224 lb 3 2 oz)   22 104 kg (229 lb)   21 104 kg (230 lb)       Physical Exam  Vitals reviewed  Constitutional:       General: He is not in acute distress  Appearance: Normal appearance  He is well-developed  He is obese  He is not ill-appearing  HENT:      Head: Normocephalic and atraumatic  Neck:      Thyroid: No thyromegaly  Cardiovascular:      Rate and Rhythm: Normal rate and regular rhythm  Pulses: Normal pulses  Heart sounds: Normal heart sounds  No murmur heard  Comments: No carotid bruits noted  Pulmonary:      Effort: Pulmonary effort is normal       Breath sounds: Normal breath sounds  No wheezing, rhonchi or rales  Musculoskeletal:      Cervical back: Neck supple  Right lower leg: No edema  Left lower leg: No edema  Lymphadenopathy:      Cervical: No cervical adenopathy  Neurological:      Mental Status: He is alert  Psychiatric:         Mood and Affect: Mood normal          Behavior: Behavior normal          Thought Content:  Thought content normal          Judgment: Judgment normal          Pertinent Laboratory/Diagnostic Studies:  Lab Results   Component Value Date    BUN 14 02/13/2021    CREATININE 0 79 02/13/2021    CALCIUM 9 3 02/13/2021     11/18/2017    K 4 0 02/13/2021    CO2 28 02/13/2021     02/13/2021     Lab Results   Component Value Date    ALT 8 (L) 02/13/2021    AST 10 02/13/2021    ALKPHOS 57 02/13/2021    BILITOT 0 7 11/18/2017       Lab Results   Component Value Date    WBC 7 7 02/13/2021    HGB 12 0 (L) 02/13/2021    HCT 38 4 (L) 02/13/2021    MCV 82 1 02/13/2021     02/13/2021     Lab Results   Component Value Date    CHOL 133 11/18/2017     Lab Results   Component Value Date    TRIG 109 02/13/2021     Lab Results   Component Value Date    HDL 42 02/13/2021     Lab Results   Component Value Date    LDLCALC 80 02/13/2021     Lab Results   Component Value Date    HGBA1C 8 0 (A) 05/27/2022       Results for orders placed or performed in visit on 06/01/22    Diabetes Eye Exam   Result Value Ref Range    Severity Normal     Right Eye Diabetic Retinopathy None     Left Eye Diabetic Retinopathy None          ALLERGIES:  No Known Allergies    Current Medications     Current Outpatient Medications   Medication Sig Dispense Refill    fluticasone (FLONASE) 50 mcg/act nasal spray SPRAY 2 SPRAYS INTO EACH NOSTRIL DAILY AS NEEDED FOR ALLERGIES  48 mL 2    Jardiance 25 MG TABS TAKE 1 TABLET BY MOUTH EVERY DAY IN THE MORNING 90 tablet 0    metFORMIN (GLUCOPHAGE) 1000 MG tablet Take 1 tablet (1,000 mg total) by mouth 2 (two) times a day 180 tablet 1    Multiple Vitamin (MULTI-DAY) TABS Take by mouth daily      Semaglutide, 2 MG/DOSE, (Ozempic, 2 MG/DOSE,) 8 MG/3ML SOPN Inject 2 mg under the skin once a week 9 mL 1    cholecalciferol (VITAMIN D3) 1,000 units tablet Take 1 tablet (1,000 Units total) by mouth daily Start after done with the high-dose  90 tablet 3     No current facility-administered medications for this visit           Health Maintenance     Health Maintenance   Topic Date Due    HIV Screening  Never done    Annual Physical  02/12/2022    Colorectal Cancer Screening  03/24/2022    COVID-19 Vaccine (4 - Booster for Pfizer series) 04/22/2022    HEMOGLOBIN A1C  08/27/2022    Diabetic Foot Exam  01/21/2023    DM Eye Exam  04/22/2023    Depression Screening  05/27/2023    URINE MICROALBUMIN  05/27/2023    BMI: Adult  09/23/2023    BMI: Followup Plan  09/26/2023    DTaP,Tdap,and Td Vaccines (2 - Td or Tdap) 09/18/2030    Hepatitis C Screening  Completed    Pneumococcal Vaccine: Pediatrics (0 to 5 Years) and At-Risk Patients (6 to 59 Years)  Completed    Influenza Vaccine  Completed    HIB Vaccine  Aged Out    Hepatitis B Vaccine  Aged Out    IPV Vaccine  Aged Out    Hepatitis A Vaccine  Aged Out    Meningococcal ACWY Vaccine  Aged Out    HPV Vaccine  Aged Out     Immunization History   Administered Date(s) Administered    COVID-19 PFIZER VACCINE 0 3 ML IM 02/19/2021, 03/12/2021, 12/22/2021    INFLUENZA 11/19/2021    Influenza Quadrivalent Preservative Free 3 years and older IM 10/03/2014, 11/25/2016, 12/13/2017    Influenza, recombinant, quadrivalent,injectable, preservative free 01/18/2019, 09/16/2019, 09/18/2020, 11/19/2021, 09/23/2022    Influenza, seasonal, injectable 01/30/2013, 11/29/2013    Pneumococcal Conjugate Vaccine 20-valent (Pcv20), Polysace 05/27/2022    Pneumococcal Polysaccharide PPV23 01/18/2019    Tdap 09/18/2020       Jesús Spivey PA-C  9/26/2022 12:20 AM  [facility]

## 2022-09-23 ENCOUNTER — OFFICE VISIT (OUTPATIENT)
Dept: FAMILY MEDICINE CLINIC | Facility: CLINIC | Age: 62
End: 2022-09-23
Payer: COMMERCIAL

## 2022-09-23 VITALS
DIASTOLIC BLOOD PRESSURE: 80 MMHG | HEART RATE: 78 BPM | HEIGHT: 70 IN | SYSTOLIC BLOOD PRESSURE: 118 MMHG | BODY MASS INDEX: 32.1 KG/M2 | WEIGHT: 224.2 LBS | OXYGEN SATURATION: 98 % | TEMPERATURE: 97.4 F

## 2022-09-23 DIAGNOSIS — G47.30 SLEEP APNEA, UNSPECIFIED TYPE: ICD-10-CM

## 2022-09-23 DIAGNOSIS — E78.5 HYPERLIPIDEMIA, UNSPECIFIED HYPERLIPIDEMIA TYPE: ICD-10-CM

## 2022-09-23 DIAGNOSIS — E65 PANNUS, ABDOMINAL: ICD-10-CM

## 2022-09-23 DIAGNOSIS — E55.9 VITAMIN D DEFICIENCY: ICD-10-CM

## 2022-09-23 DIAGNOSIS — M77.12 LATERAL EPICONDYLITIS OF LEFT ELBOW: ICD-10-CM

## 2022-09-23 DIAGNOSIS — J45.20 MILD INTERMITTENT ASTHMA WITHOUT COMPLICATION: ICD-10-CM

## 2022-09-23 DIAGNOSIS — E66.9 OBESITY (BMI 30-39.9): ICD-10-CM

## 2022-09-23 DIAGNOSIS — Z23 INFLUENZA VACCINE NEEDED: ICD-10-CM

## 2022-09-23 DIAGNOSIS — E11.3293 TYPE 2 DIABETES MELLITUS WITH MILD NONPROLIFERATIVE RETINOPATHY OF BOTH EYES, WITHOUT LONG-TERM CURRENT USE OF INSULIN, MACULAR EDEMA PRESENCE UNSPECIFIED (HCC): Primary | ICD-10-CM

## 2022-09-23 PROCEDURE — 99214 OFFICE O/P EST MOD 30 MIN: CPT | Performed by: PHYSICIAN ASSISTANT

## 2022-09-23 PROCEDURE — 90682 RIV4 VACC RECOMBINANT DNA IM: CPT

## 2022-09-23 PROCEDURE — 90471 IMMUNIZATION ADMIN: CPT

## 2022-09-23 RX ORDER — SEMAGLUTIDE 2.68 MG/ML
2 INJECTION, SOLUTION SUBCUTANEOUS WEEKLY
Qty: 9 ML | Refills: 1 | Status: SHIPPED | OUTPATIENT
Start: 2022-09-23 | End: 2022-09-28

## 2022-09-25 LAB
25(OH)D3 SERPL-MCNC: 44 NG/ML (ref 30–100)
ALBUMIN SERPL-MCNC: 4.1 G/DL (ref 3.6–5.1)
ALBUMIN/GLOB SERPL: 1.7 (CALC) (ref 1–2.5)
ALP SERPL-CCNC: 68 U/L (ref 35–144)
ALT SERPL-CCNC: 10 U/L (ref 9–46)
AST SERPL-CCNC: 12 U/L (ref 10–35)
BASOPHILS # BLD AUTO: 41 CELLS/UL (ref 0–200)
BASOPHILS NFR BLD AUTO: 0.6 %
BILIRUB SERPL-MCNC: 0.6 MG/DL (ref 0.2–1.2)
BUN SERPL-MCNC: 12 MG/DL (ref 7–25)
BUN/CREAT SERPL: ABNORMAL (CALC) (ref 6–22)
CALCIUM SERPL-MCNC: 9.4 MG/DL (ref 8.6–10.3)
CHLORIDE SERPL-SCNC: 104 MMOL/L (ref 98–110)
CHOLEST SERPL-MCNC: 144 MG/DL
CHOLEST/HDLC SERPL: 3 (CALC)
CO2 SERPL-SCNC: 29 MMOL/L (ref 20–32)
CREAT SERPL-MCNC: 0.82 MG/DL (ref 0.7–1.35)
EOSINOPHIL # BLD AUTO: 242 CELLS/UL (ref 15–500)
EOSINOPHIL NFR BLD AUTO: 3.5 %
ERYTHROCYTE [DISTWIDTH] IN BLOOD BY AUTOMATED COUNT: 12.9 % (ref 11–15)
GFR/BSA.PRED SERPLBLD CYS-BASED-ARV: 99 ML/MIN/1.73M2
GLOBULIN SER CALC-MCNC: 2.4 G/DL (CALC) (ref 1.9–3.7)
GLUCOSE SERPL-MCNC: 117 MG/DL (ref 65–99)
HBA1C MFR BLD: 7.1 % OF TOTAL HGB
HCT VFR BLD AUTO: 43.9 % (ref 38.5–50)
HDLC SERPL-MCNC: 48 MG/DL
HGB BLD-MCNC: 14.7 G/DL (ref 13.2–17.1)
LDLC SERPL CALC-MCNC: 81 MG/DL (CALC)
LYMPHOCYTES # BLD AUTO: 1477 CELLS/UL (ref 850–3900)
LYMPHOCYTES NFR BLD AUTO: 21.4 %
MCH RBC QN AUTO: 29.6 PG (ref 27–33)
MCHC RBC AUTO-ENTMCNC: 33.5 G/DL (ref 32–36)
MCV RBC AUTO: 88.5 FL (ref 80–100)
MONOCYTES # BLD AUTO: 531 CELLS/UL (ref 200–950)
MONOCYTES NFR BLD AUTO: 7.7 %
NEUTROPHILS # BLD AUTO: 4609 CELLS/UL (ref 1500–7800)
NEUTROPHILS NFR BLD AUTO: 66.8 %
NONHDLC SERPL-MCNC: 96 MG/DL (CALC)
PLATELET # BLD AUTO: 258 THOUSAND/UL (ref 140–400)
PMV BLD REES-ECKER: 9.2 FL (ref 7.5–12.5)
POTASSIUM SERPL-SCNC: 4.5 MMOL/L (ref 3.5–5.3)
PROT SERPL-MCNC: 6.5 G/DL (ref 6.1–8.1)
RBC # BLD AUTO: 4.96 MILLION/UL (ref 4.2–5.8)
SODIUM SERPL-SCNC: 140 MMOL/L (ref 135–146)
TRIGL SERPL-MCNC: 72 MG/DL
TSH SERPL-ACNC: 0.88 MIU/L (ref 0.4–4.5)
WBC # BLD AUTO: 6.9 THOUSAND/UL (ref 3.8–10.8)

## 2022-09-25 PROCEDURE — 3051F HG A1C>EQUAL 7.0%<8.0%: CPT | Performed by: PHYSICIAN ASSISTANT

## 2022-09-26 ENCOUNTER — TELEPHONE (OUTPATIENT)
Dept: FAMILY MEDICINE CLINIC | Facility: CLINIC | Age: 62
End: 2022-09-26

## 2022-09-26 PROBLEM — E55.9 VITAMIN D DEFICIENCY: Status: ACTIVE | Noted: 2022-09-26

## 2022-09-26 NOTE — ASSESSMENT & PLAN NOTE
BMI Counseling: Body mass index is 32 17 kg/m²  The BMI is above normal  Nutrition recommendations include reducing portion sizes and moderation in carbohydrate intake  Exercise recommendations include exercising 3-5 times per week

## 2022-09-26 NOTE — ASSESSMENT & PLAN NOTE
Patient will be pursuing a panniculectomy  He states that he needs a letter in a test ordered by us before he can get the surgery done  Staff was asked to call her clarification

## 2022-09-26 NOTE — TELEPHONE ENCOUNTER
L/m for patient to return call  At [address] 9/23/22 with Shiloh Ortega, patient discussed Panniculectomy pre-op letter or test needed prior  Needs clarification   Please ask patient to provide info on surgeon, location, date, etc  Will need to reach out to surgeon's office for details on what is needed from PCP (pre-op visit?)

## 2022-09-26 NOTE — ASSESSMENT & PLAN NOTE
Currently on metformin 1000 mg twice daily, Jardiance 25 mg daily and Trulicity 4 5 mg weekly  He states that he would like to switch to Ozempic in hopes of improving his blood sugar control  Script was sent to pharmacy  He is up-to-date on his eye exam and foot exam   He will go for his blood work as previously ordered    Lab Results   Component Value Date    HGBA1C 8 0 (A) 05/27/2022

## 2022-09-28 NOTE — TELEPHONE ENCOUNTER
SADE for patient to call us back to obtain information regarding his surgery  (See previous message)

## 2022-09-28 NOTE — TELEPHONE ENCOUNTER
Per Blanca Rincon, patient had scheduled Panniculectomy with Dr Trudy Li office (p) [phone]  L/m for Surgical Coordinator asking for clarification on clearance needs from PCP side

## 2022-09-29 ENCOUNTER — TELEPHONE (OUTPATIENT)
Dept: FAMILY MEDICINE CLINIC | Facility: CLINIC | Age: 62
End: 2022-09-29

## 2022-09-29 NOTE — TELEPHONE ENCOUNTER
Please call the patient's pharmacy  A script for Ozempic was sent at the time of his visit, but a response was sent back that the medication is on back order  If this is not the case, they can use the script was just sent the other day (or a I can send another script if necessary)  However, if it is on back order, he should continue with the Trulicity that he has

## 2022-09-29 NOTE — TELEPHONE ENCOUNTER
Pt called stating that the pharmacy called him and told him he needs a script for Ozempic  He stated that you and he had spoken about switching from Trulicity to [address]  He is requesting you to put the script in for him so he can get started on it

## 2022-09-30 NOTE — TELEPHONE ENCOUNTER
Spoke with the pharmacy when originally Rx was send  They do not have any Ozempic or Trulicity at this time  Pharmacist state that all [facility] a [facility] this 2 medications  She give [facility] of different [facility] witch [facility] , I call there but they have only different strength of Ozempic and only 3 injections and she informed me that she do not see ant [facility] having this medications , they back order in all pharmacies

## 2022-10-03 NOTE — TELEPHONE ENCOUNTER
Ok  Please let patient know that we cannot switch currently  Please find out how much Trulicity he has for now  According to [facility], the [address] will hopefully be resolved by end of October  We could consider trying to switch at that time once it is in stock

## 2022-10-04 ENCOUNTER — TELEPHONE (OUTPATIENT)
Dept: FAMILY MEDICINE CLINIC | Facility: CLINIC | Age: 62
End: 2022-10-04

## 2022-10-04 NOTE — TELEPHONE ENCOUNTER
Please see other tasks regarding this  It is on backorder and not available  According to [id number] might be resolved by the end of the month

## 2022-10-04 NOTE — TELEPHONE ENCOUNTER
Called pt to let him know it is on back order, he states he spoke with pharmacy and they advised him that it was in stock again at his pharmacy

## 2022-10-04 NOTE — TELEPHONE ENCOUNTER
Pt called in stating he needs a refill for ozempic  I do not see that as a current med   He states he is taking this instead of trulicity

## 2022-10-04 NOTE — TELEPHONE ENCOUNTER
SADE for patient with info [facility] pharmacy regarding medications and to call us back haw much of Trulicity he is taking now

## 2022-10-06 DIAGNOSIS — E11.3293 TYPE 2 DIABETES MELLITUS WITH MILD NONPROLIFERATIVE RETINOPATHY OF BOTH EYES, WITHOUT LONG-TERM CURRENT USE OF INSULIN, MACULAR EDEMA PRESENCE UNSPECIFIED (HCC): Primary | ICD-10-CM

## 2022-10-06 RX ORDER — SEMAGLUTIDE 2.68 MG/ML
2 INJECTION, SOLUTION SUBCUTANEOUS WEEKLY
Qty: 3 ML | Refills: 3 | Status: SHIPPED | OUTPATIENT
Start: 2022-10-06 | End: 2022-10-12

## 2022-10-12 DIAGNOSIS — E11.3293 TYPE 2 DIABETES MELLITUS WITH MILD NONPROLIFERATIVE RETINOPATHY OF BOTH EYES, WITHOUT LONG-TERM CURRENT USE OF INSULIN, MACULAR EDEMA PRESENCE UNSPECIFIED (HCC): ICD-10-CM

## 2022-10-12 RX ORDER — SEMAGLUTIDE 2.68 MG/ML
2 INJECTION, SOLUTION SUBCUTANEOUS WEEKLY
Qty: 3 ML | Refills: 3 | Status: SHIPPED | OUTPATIENT
Start: 2022-10-12 | End: 2022-10-24

## 2022-10-23 DIAGNOSIS — E11.3293 TYPE 2 DIABETES MELLITUS WITH MILD NONPROLIFERATIVE RETINOPATHY OF BOTH EYES, WITHOUT LONG-TERM CURRENT USE OF INSULIN, MACULAR EDEMA PRESENCE UNSPECIFIED (HCC): ICD-10-CM

## 2022-10-23 RX ORDER — EMPAGLIFLOZIN 25 MG/1
TABLET, FILM COATED ORAL
Qty: 90 TABLET | Refills: 0 | Status: SHIPPED | OUTPATIENT
Start: 2022-10-23

## 2022-10-24 ENCOUNTER — OFFICE VISIT (OUTPATIENT)
Dept: FAMILY MEDICINE CLINIC | Facility: CLINIC | Age: 62
End: 2022-10-24
Payer: COMMERCIAL

## 2022-10-24 VITALS
OXYGEN SATURATION: 97 % | DIASTOLIC BLOOD PRESSURE: 80 MMHG | TEMPERATURE: 96.9 F | SYSTOLIC BLOOD PRESSURE: 110 MMHG | BODY MASS INDEX: 31.5 KG/M2 | HEART RATE: 83 BPM | HEIGHT: 70 IN | WEIGHT: 220 LBS

## 2022-10-24 DIAGNOSIS — E11.3293 TYPE 2 DIABETES MELLITUS WITH MILD NONPROLIFERATIVE RETINOPATHY OF BOTH EYES, WITHOUT LONG-TERM CURRENT USE OF INSULIN, MACULAR EDEMA PRESENCE UNSPECIFIED (HCC): ICD-10-CM

## 2022-10-24 DIAGNOSIS — J40 BRONCHITIS: Primary | ICD-10-CM

## 2022-10-24 PROCEDURE — 99213 OFFICE O/P EST LOW 20 MIN: CPT | Performed by: PHYSICIAN ASSISTANT

## 2022-10-24 RX ORDER — AZITHROMYCIN 250 MG/1
TABLET, FILM COATED ORAL
Qty: 6 TABLET | Refills: 0 | Status: SHIPPED | OUTPATIENT
Start: 2022-10-24 | End: 2022-10-29

## 2022-10-24 RX ORDER — ALBUTEROL SULFATE 90 UG/1
2 AEROSOL, METERED RESPIRATORY (INHALATION) EVERY 6 HOURS PRN
Qty: 8.5 G | Refills: 0 | Status: SHIPPED | OUTPATIENT
Start: 2022-10-24

## 2022-10-24 NOTE — PROGRESS NOTES
FAMILY PRACTICE ACUTE OFFICE VISIT  [facility] - [facility]       NAME: Malini Becerra  AGE: 60 y o  SEX: male       : 1960        MRN: [id number]    DATE: 10/25/2022  TIME: 2:05 PM    Assessment and Plan     Problem List Items Addressed This Visit        Endocrine    Type 2 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, without long-term current use of insulin ([address] )     Patient notes being unable to get the Ozempic that he wants to transition to  Will return to Trulicity 4 5 mg weekly  Lab Results   Component Value Date    HGBA1C 7 1 (H) 2022            Relevant Medications    Dulaglutide 4 5 MG/0 5ML SOPN      Other Visit Diagnoses     Bronchitis    -  Primary    Start Zpak and use albuterol as needed  Continue with Nyquil as needed  Relevant Medications    azithromycin (Zithromax) 250 mg tablet    albuterol (ProAir HFA) 90 mcg/act inhaler              Chief Complaint     Chief Complaint   Patient presents with   • Cold Like Symptoms     Same day appointment for cold, cough, mucus  History of Present Illness   Malini Becerra is a 60y o -year-old male who presents for cold symptoms  URI   This is a new problem  Episode onset: about a week ago  The problem has been waxing and waning (seemed improving and then got worse over the weekend)  There has been no fever  Associated symptoms include congestion, coughing (yellow), diarrhea (slight) and a sore throat  Pertinent negatives include no ear pain, nausea, rhinorrhea, sneezing, vomiting or wheezing  Treatments tried: taking Nyquil at night which helps him sleep  Review of Systems   Review of Systems   Constitutional: Negative for chills and fever  HENT: Positive for congestion, postnasal drip and sore throat  Negative for ear discharge, ear pain, rhinorrhea and sneezing  Respiratory: Positive for cough (yellow)   Negative for chest tightness, shortness of breath and wheezing  Gastrointestinal: Positive for diarrhea (slight)  Negative for constipation, nausea and vomiting  Musculoskeletal: Negative for myalgias  Active Problem List     Patient Active Problem List   Diagnosis   • Asthma   • Basal cell carcinoma of skin   • Environmental and seasonal allergies   • Hyperlipidemia   • History of hypertension   • Obesity (BMI 30-39  9)   • Onychomycosis of toenail   • Pannus, abdominal   • Type 2 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, without long-term current use of insulin (HCC)   • Sciatica of left side   • Sleep apnea   • Bilateral impacted cerumen   • Anemia   • Decreased pulse   • Vitamin D deficiency         Social History:  Social History     Socioeconomic History   • Marital status: /Civil Union     Spouse name: Not on file   • Number of children: Not on file   • Years of education: Not on file   • Highest education level: Not on file   Occupational History   • Not on file   Tobacco Use   • Smoking status: Former Smoker     Quit date: 1977     Years since quittin 8   • Smokeless tobacco: Never Used   Vaping Use   • Vaping Use: Never used   Substance and Sexual Activity   • Alcohol use: Yes     Comment: Social    • Drug use: No   • Sexual activity: Not on file   Other Topics Concern   • Not on file   Social History Narrative   • Not on file     Social Determinants of Health     Financial Resource Strain: Not on file   Food Insecurity: Not on file   Transportation Needs: Not on file   Physical Activity: Not on file   Stress: Not on file   Social Connections: Not on file   Intimate Partner Violence: Not on file   Housing Stability: Not on file       Objective     Vitals:    10/24/22 1100   BP: 110/80   BP Location: Right arm   Cuff Size: Standard   Pulse: 83   Temp: (!) 96 9 °F (36 1 °C)   TempSrc: Tympanic   SpO2: 97%   Weight: 99 8 kg (220 lb)   Height: 5' 10" (1 778 m)     Wt Readings from Last 3 Encounters: 10/24/22 99 8 kg (220 lb)   09/23/22 102 kg (224 lb 3 2 oz)   05/27/22 104 kg (229 lb)       Physical Exam  Vitals reviewed  Constitutional:       General: He is not in acute distress  Appearance: Normal appearance  He is well-developed  He is obese  He is not ill-appearing  HENT:      Head: Normocephalic and atraumatic  Right Ear: Tympanic membrane, ear canal and external ear normal       Left Ear: Tympanic membrane, ear canal and external ear normal       Nose: Congestion (without erythema) present  Right Sinus: No maxillary sinus tenderness or frontal sinus tenderness  Left Sinus: No maxillary sinus tenderness or frontal sinus tenderness  Mouth/Throat:      Mouth: Mucous membranes are moist       Pharynx: No oropharyngeal exudate  Eyes:      General: Lids are normal       Conjunctiva/sclera: Conjunctivae normal       Pupils: Pupils are equal, round, and reactive to light  Neck:      Thyroid: No thyromegaly  Trachea: Trachea normal    Cardiovascular:      Rate and Rhythm: Normal rate and regular rhythm  Pulses: Normal pulses  Radial pulses are 2+ on the right side and 2+ on the left side  Heart sounds: Normal heart sounds  No murmur heard  Pulmonary:      Effort: Pulmonary effort is normal       Breath sounds: Wheezing (faint expiratory wheezing scattered bilaterally) present  No decreased breath sounds, rhonchi or rales  Musculoskeletal:      Cervical back: Normal range of motion and neck supple  Right lower leg: No edema  Left lower leg: No edema  Lymphadenopathy:      Cervical: No cervical adenopathy  Skin:     Findings: No rash  Neurological:      Mental Status: He is alert  Psychiatric:         Mood and Affect: Mood normal          Behavior: Behavior normal          Thought Content:  Thought content normal          Judgment: Judgment normal          Pertinent Laboratory/Diagnostic Studies:  Results for orders placed or performed in visit on 09/24/22   Lipid Panel with Direct LDL reflex   Result Value Ref Range    Total Cholesterol 144 <200 mg/dL    HDL 48 > OR = 40 mg/dL    Triglycerides 72 <150 mg/dL    LDL Calculated 81 mg/dL (calc)    Chol HDLC Ratio 3 0 <5 0 (calc)    Non-HDL Cholesterol 96 <130 mg/dL (calc)   Comprehensive metabolic panel   Result Value Ref Range    Glucose, Random 117 (H) 65 - 99 mg/dL    BUN 12 7 - 25 mg/dL    Creatinine 0 82 0 70 - 1 35 mg/dL    eGFR 99 > OR = 60 mL/min/1 73m2    SL AMB BUN/CREATININE RATIO NOT APPLICABLE 6 - 22 (calc)    Sodium 140 135 - 146 mmol/L    Potassium 4 5 3 5 - 5 3 mmol/L    Chloride 104 98 - 110 mmol/L    CO2 29 20 - 32 mmol/L    Calcium 9 4 8 6 - 10 3 mg/dL    Protein, Total 6 5 6 1 - 8 1 g/dL    Albumin 4 1 3 6 - 5 1 g/dL    Globulin 2 4 1 9 - 3 7 g/dL (calc)    Albumin/Globulin Ratio 1 7 1 0 - 2 5 (calc)    TOTAL BILIRUBIN 0 6 0 2 - 1 2 mg/dL    Alkaline Phosphatase 68 35 - 144 U/L    AST 12 10 - 35 U/L    ALT 10 9 - 46 U/L   CBC and differential   Result Value Ref Range    White Blood Cell Count 6 9 3 8 - 10 8 Thousand/uL    Red Blood Cell Count 4 96 4 20 - 5 80 Million/uL    Hemoglobin 14 7 13 2 - 17 1 g/dL    HCT 43 9 38 5 - 50 0 %    MCV 88 5 80 0 - 100 0 fL    MCH 29 6 27 0 - 33 0 pg    MCHC 33 5 32 0 - 36 0 g/dL    RDW 12 9 11 0 - 15 0 %    Platelet Count 583 597 - 400 Thousand/uL    SL AMB MPV 9 2 7 5 - 12 5 fL    Neutrophils (Absolute) 4,609 1,500 - 7,800 cells/uL    Lymphocytes (Absolute) 1,477 850 - 3,900 cells/uL    Monocytes (Absolute) 531 200 - 950 cells/uL    Eosinophils (Absolute) 242 15 - 500 cells/uL    Basophils ABS 41 0 - 200 cells/uL    Neutrophils 66 8 %    Lymphocytes 21 4 %    Monocytes 7 7 %    Eosinophils 3 5 %    Basophils PCT 0 6 %   TSH, 3rd generation with Free T4 reflex   Result Value Ref Range    TSH W/RFX TO FREE T4 0 88 0 40 - 4 50 mIU/L   Vitamin D 25 hydroxy   Result Value Ref Range    Vitamin D, 25-Hydroxy, Serum 44 30 - 100 ng/mL   Hemoglobin A1c (w/out EAG)   Result Value Ref Range    Hemoglobin A1C 7 1 (H) <5 7 % of total Hgb         ALLERGIES:  No Known Allergies    Current Medications     Current Outpatient Medications   Medication Sig Dispense Refill   • albuterol (ProAir HFA) 90 mcg/act inhaler Inhale 2 puffs every 6 (six) hours as needed for wheezing or shortness of breath 8 5 g 0   • azithromycin (Zithromax) 250 mg tablet Take 2 tablets (500 mg total) by mouth daily for 1 day, THEN 1 tablet (250 mg total) daily for 4 days  6 tablet 0   • Dulaglutide 4 5 MG/0 5ML SOPN Inject 0 5 mL (4 5 mg total) under the skin once a week 2 mL 5   • fluticasone (FLONASE) 50 mcg/act nasal spray SPRAY 2 SPRAYS INTO EACH NOSTRIL DAILY AS NEEDED FOR ALLERGIES  48 mL 2   • Jardiance 25 MG TABS TAKE 1 TABLET BY MOUTH EVERY DAY IN THE MORNING 90 tablet 0   • metFORMIN (GLUCOPHAGE) 1000 MG tablet Take 1 tablet (1,000 mg total) by mouth 2 (two) times a day 180 tablet 1   • Multiple Vitamin (MULTI-DAY) TABS Take by mouth daily     • cholecalciferol (VITAMIN D3) 1,000 units tablet Take 1 tablet (1,000 Units total) by mouth daily Start after done with the high-dose  90 tablet 3     No current facility-administered medications for this visit           Aneta Mcburney  10/25/2022 2:05 PM  [facility]

## 2022-10-25 DIAGNOSIS — E11.3293 TYPE 2 DIABETES MELLITUS WITH MILD NONPROLIFERATIVE RETINOPATHY OF BOTH EYES, WITHOUT LONG-TERM CURRENT USE OF INSULIN, MACULAR EDEMA PRESENCE UNSPECIFIED (HCC): ICD-10-CM

## 2022-10-25 NOTE — ASSESSMENT & PLAN NOTE
Patient notes being unable to get the Ozempic that he wants to transition to  Will return to Trulicity 4 5 mg weekly    Lab Results   Component Value Date    HGBA1C 7 1 (H) 09/24/2022

## 2022-10-27 NOTE — TELEPHONE ENCOUNTER
Please reach out to pharmacy to see if another location has this  (About a month ago, I spoke with a pharmacist and was able to find out which local [facility] had the medication   If not available at any, can Crista Bergman help us with [facility] coupons?)

## 2022-10-28 RX ORDER — TIRZEPATIDE 5 MG/.5ML
5 INJECTION, SOLUTION SUBCUTANEOUS WEEKLY
Qty: 2 ML | Refills: 1 | Status: SHIPPED | OUTPATIENT
Start: 2022-10-28 | End: 2022-10-31 | Stop reason: SDUPTHER

## 2022-10-28 NOTE — TELEPHONE ENCOUNTER
[id number] [facility]    PCP: order updated and pended  ____________________________________________________________________    Communication with patient: Yes, Phone call with patient    Patient does not check home blood sugar readings  Last took Trulicity ~1 month ago  Denied GI signs/symptoms when he increased GLP1 dose previously  Wanted to convert to Ozempic due to more weight loss potential      Lab Results   Component Value Date    HGBA1C 7 1 (H) 09/24/2022     Ozempic and Trulicity are currently on backorder, supposed to be resolved by end of Oct however uncertain likelihood of that  May benefit from converting to [facility]; reviewed starting at 2 5mg vs 5mg  Patient would like to try higher dose as he was able to tolerate GLP1 previously  Agrees to contact PharmD or PCP if any tolerability issues  Will send [address] with [facility] copay card information  Will call patient in 4 weeks to follow-up on tolerability      Pharmacist Tracking Tool  Reason For Outreach: Embedded Pharmacist    Demographics:  Intervention Method: Phone  Type of Intervention: New  Topics Addressed: Diabetes  Pharmacologic Interventions: Medication Conversion  Non-Pharmacologic Interventions: Cost  Time:  Direct Patient Care: 5 mins   Care Coordination: 10 mins  Recommendation Recipient: Patient/Caregiver  Outcome: Accepted

## 2022-10-28 NOTE — TELEPHONE ENCOUNTER
Spoke with pharmacy  [facility] is no [facility] [place] that [facility] this in stock  Please advise how you would like to proceed   Adding Imani to message

## 2022-10-31 DIAGNOSIS — E11.3293 TYPE 2 DIABETES MELLITUS WITH MILD NONPROLIFERATIVE RETINOPATHY OF BOTH EYES, WITHOUT LONG-TERM CURRENT USE OF INSULIN, MACULAR EDEMA PRESENCE UNSPECIFIED (HCC): ICD-10-CM

## 2022-11-01 RX ORDER — TIRZEPATIDE 5 MG/.5ML
5 INJECTION, SOLUTION SUBCUTANEOUS WEEKLY
Qty: 2 ML | Refills: 1 | Status: SHIPPED | OUTPATIENT
Start: 2022-11-01

## 2022-12-07 DIAGNOSIS — E11.3293 TYPE 2 DIABETES MELLITUS WITH MILD NONPROLIFERATIVE RETINOPATHY OF BOTH EYES, WITHOUT LONG-TERM CURRENT USE OF INSULIN, MACULAR EDEMA PRESENCE UNSPECIFIED (HCC): ICD-10-CM

## 2022-12-07 RX ORDER — TIRZEPATIDE 5 MG/.5ML
5 INJECTION, SOLUTION SUBCUTANEOUS WEEKLY
Qty: 2 ML | Refills: 1 | Status: SHIPPED | OUTPATIENT
Start: 2022-12-07

## 2022-12-07 NOTE — TELEPHONE ENCOUNTER
Call returned  Patient reports pharmacy never received [facility] script, off of Trulicity   Pending order for PCP signature/concurrence

## 2022-12-08 DIAGNOSIS — E11.65 POORLY CONTROLLED DIABETES MELLITUS (HCC): ICD-10-CM

## 2022-12-16 ENCOUNTER — OFFICE VISIT (OUTPATIENT)
Dept: FAMILY MEDICINE CLINIC | Facility: CLINIC | Age: 62
End: 2022-12-16

## 2022-12-16 ENCOUNTER — APPOINTMENT (OUTPATIENT)
Dept: RADIOLOGY | Facility: MEDICAL CENTER | Age: 62
End: 2022-12-16

## 2022-12-16 VITALS
HEART RATE: 70 BPM | BODY MASS INDEX: 31.78 KG/M2 | HEIGHT: 70 IN | DIASTOLIC BLOOD PRESSURE: 80 MMHG | WEIGHT: 222 LBS | SYSTOLIC BLOOD PRESSURE: 122 MMHG

## 2022-12-16 DIAGNOSIS — M25.551 RIGHT HIP PAIN: Primary | ICD-10-CM

## 2022-12-16 DIAGNOSIS — E11.3293 TYPE 2 DIABETES MELLITUS WITH MILD NONPROLIFERATIVE RETINOPATHY OF BOTH EYES, WITHOUT LONG-TERM CURRENT USE OF INSULIN, MACULAR EDEMA PRESENCE UNSPECIFIED (HCC): ICD-10-CM

## 2022-12-16 DIAGNOSIS — M25.551 RIGHT HIP PAIN: ICD-10-CM

## 2022-12-16 NOTE — PROGRESS NOTES
Assessment/Plan:       Problem List Items Addressed This Visit        Endocrine    Type 2 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, without long-term current use of insulin ([facility])    Relevant Medications    Dulaglutide 1 5 MG/0 5ML SOPN   Other Visit Diagnoses     Right hip pain    -  Primary    Relevant Medications    Diclofenac Sodium (VOLTAREN) 1 %    Other Relevant Orders    XR hip/pelv 2-3 vws right if performed    Ambulatory Referral to Physical Therapy        1  Right hip pain  Likely hip flexor strain ,send for PT check xray  - XR hip/pelv 2-3 vws right if performed; Future  - Ambulatory Referral to Physical Therapy; Future  - Diclofenac Sodium (VOLTAREN) 1 %; Apply 2 g topically 4 (four) times a day  Dispense: 50 g; Refill: 0    2  Type 2 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, without long-term current use of insulin, macular edema presence unspecified ([facility] )  Start on 1 5 mg, as pharmacy out of mounjaro previously on 3mg but out at pharmacy, increase dose or change when able  - Dulaglutide 1 5 MG/0 5ML SOPN; Inject 0 5 mL (1 5 mg total) under the skin once a week  Dispense: 6 mL; Refill: 2      Subjective:      Patient ID: Surinder Landry is a 60 y o  male  HPI    60year old presenting for sporadic right pain, he has had this for about a week  Pain with getting in out of truck at work  Worse in AM     Has not had hip pains in the past     Reports pharmacy out of tirzepatide, and 3mg dulaglutide          The following portions of the patient's history were reviewed and updated as appropriate: allergies, current medications, past family history, past medical history, past social history, past surgical history and problem list       Current Outpatient Medications:   •  albuterol (ProAir HFA) 90 mcg/act inhaler, Inhale 2 puffs every 6 (six) hours as needed for wheezing or shortness of breath, Disp: 8 5 g, Rfl: 0  •  cholecalciferol (VITAMIN D3) 1,000 units tablet, Take 1 tablet (1,000 Units total) by mouth daily Start after done with the high-dose , Disp: 90 tablet, Rfl: 3  •  Diclofenac Sodium (VOLTAREN) 1 %, Apply 2 g topically 4 (four) times a day, Disp: 50 g, Rfl: 0  •  Dulaglutide 1 5 MG/0 5ML SOPN, Inject 0 5 mL (1 5 mg total) under the skin once a week, Disp: 6 mL, Rfl: 2  •  fluticasone (FLONASE) 50 mcg/act nasal spray, SPRAY 2 SPRAYS INTO EACH NOSTRIL DAILY AS NEEDED FOR ALLERGIES , Disp: 48 mL, Rfl: 2  •  Jardiance 25 MG TABS, TAKE 1 TABLET BY MOUTH EVERY DAY IN THE MORNING, Disp: 90 tablet, Rfl: 0  •  metFORMIN (GLUCOPHAGE) 1000 MG tablet, TAKE 1 TABLET BY MOUTH TWICE A DAY, Disp: 180 tablet, Rfl: 1  •  Multiple Vitamin (MULTI-DAY) TABS, Take by mouth daily, Disp: , Rfl:   •  Tirzepatide (Mounjaro) 5 MG/0 5ML SOPN, Inject 5 mg under the skin once a week For diabetes *USE MOUNJARO COPAY CARD*, Disp: 2 mL, Rfl: 1     Review of Systems   Constitutional: Negative for activity change and appetite change  Respiratory: Negative for apnea and chest tightness  Cardiovascular: Negative for chest pain and leg swelling  Gastrointestinal: Negative for abdominal distention and abdominal pain  Musculoskeletal: Negative for arthralgias and back pain  Objective:      /80 (BP Location: Left arm, Patient Position: Sitting, Cuff Size: Adult)   Pulse 70   Ht 5' 10" (1 778 m)   Wt 101 kg (222 lb)   BMI 31 85 kg/m²          Physical Exam  Constitutional:       Appearance: Normal appearance  Cardiovascular:      Rate and Rhythm: Normal rate and regular rhythm  Pulmonary:      Effort: Pulmonary effort is normal       Breath sounds: Normal breath sounds  Musculoskeletal:         General: Normal range of motion  Right hip: Normal range of motion  Left hip: Normal       Comments: Pain with abduction, no tenderness, crepitus     Neurological:      Mental Status: He is alert             Bud Davis MD

## 2022-12-16 NOTE — PATIENT INSTRUCTIONS

## 2023-01-13 ENCOUNTER — EVALUATION (OUTPATIENT)
Dept: PHYSICAL THERAPY | Facility: CLINIC | Age: 63
End: 2023-01-13

## 2023-01-13 DIAGNOSIS — M25.551 RIGHT HIP PAIN: ICD-10-CM

## 2023-01-13 NOTE — PROGRESS NOTES
PT Evaluation     Today's date: 2023  Patient name: Debra Rebollar  : 1960  MRN: [id number]  Referring provider: Miguel Angel Ritter*  Dx:   Encounter Diagnosis     ICD-10-CM    1  Right hip pain  M25 551 Ambulatory Referral to Physical Therapy                     Assessment  Assessment details: Debra Rebollar is a pleasant 60 y o  male presents with R hip pain low irritability  Pt with significant hip mobility restrictions and weakness leading to altered gait pattern  Educated on stretching/strengthening to address  No further referral appears necessary at this time  Skilled PT services appropriate to facilitate return to prior level of function with transition to home exercise program for independent management when appropriate  Impairments: abnormal muscle firing, abnormal muscle tone, abnormal or restricted ROM, impaired physical strength, lacks appropriate home exercise program and pain with function  Understanding of Dx/Px/POC: good   Prognosis: good    Goals  Patient will be able to manage symptoms independently  LT weeks  1  pt will reach foto predicted  2  pt will decrease worst pain 75%   ST weeks  1  Pt will decrease worst pain 50%  2   Patient will successfully manage HEP    Plan  Patient would benefit from: skilled PT  Referral necessary: No  Planned modality interventions: thermotherapy: hydrocollator packs  Planned therapy interventions: home exercise program, manual therapy, neuromuscular re-education, patient education, functional ROM exercises, strengthening, stretching, joint mobilization, graded activity, graded exercise, therapeutic exercise, body mechanics training, motor coordination training and activity modification  Frequency: 2x week  Duration in weeks: 12  Treatment plan discussed with: patient        Subjective Evaluation    History of Present Illness  Mechanism of injury: Pt reports significant R lateral and posterior hip pain one morning when he got out of bed  He has no known VANDANA and no prior hx of hip pain  He is a  and getting in/out tends to bother it  He denies night and morning pain  Minimal irritation with sitting long periods in his truck  Overall pain has been decreasing since injury  Hip x-ray is showing mild OA  He wants to be ready for cycling when the weather gets nicer     Pain  Current pain ratin  At worst pain ratin  Quality: sharp and dull ache    Treatments  Current treatment: physical therapy  Patient Goals  Patient goals for therapy: decreased pain, increased strength and increased motion          Objective     Passive Range of Motion   Left Hip   Flexion: 120 degrees   External rotation (90/90): 30 degrees   Internal rotation (90/90): 10 degrees     Right Hip   External rotation (90/90): 30 degrees   Internal rotation (90/90): 10 degrees     Strength/Myotome Testing     Right Hip   Planes of Motion   Flexion: 3+  Extension: 4-  Abduction: 3+    General Comments:      Hip Comments   Piriformis tender to palpation    R lateral lean in gait    Mild posterior hip pain in stance and going up stairs in WBing             Precautions: none      Manuals             Piriformis release nv                                                   Neuro Re-Ed                                                                                                        Ther Ex             Piriformis stretch reviewed            Active elongation hamstring reviewed            bridge reviewed            SAQ to SLR  nv            SL clam shells nv            bike nv                                      Ther Activity                                       Gait Training                                       Modalities

## 2023-01-18 DIAGNOSIS — E11.3293 TYPE 2 DIABETES MELLITUS WITH MILD NONPROLIFERATIVE RETINOPATHY OF BOTH EYES, WITHOUT LONG-TERM CURRENT USE OF INSULIN, MACULAR EDEMA PRESENCE UNSPECIFIED (HCC): ICD-10-CM

## 2023-01-18 RX ORDER — EMPAGLIFLOZIN 25 MG/1
TABLET, FILM COATED ORAL
Qty: 90 TABLET | Refills: 0 | Status: SHIPPED | OUTPATIENT
Start: 2023-01-18

## 2023-01-20 ENCOUNTER — APPOINTMENT (OUTPATIENT)
Dept: PHYSICAL THERAPY | Facility: CLINIC | Age: 63
End: 2023-01-20

## 2023-01-27 ENCOUNTER — APPOINTMENT (OUTPATIENT)
Dept: PHYSICAL THERAPY | Facility: CLINIC | Age: 63
End: 2023-01-27

## 2023-03-07 ENCOUNTER — TELEPHONE (OUTPATIENT)
Dept: FAMILY MEDICINE CLINIC | Facility: CLINIC | Age: 63
End: 2023-03-07

## 2023-03-07 NOTE — TELEPHONE ENCOUNTER
Date/Time: 3-7-23 / 2:28 PM    Pt called inquiring about information pertaining to his CPAP machine  He also would like to switch to Ozempic  He is requesting a phone call from you

## 2023-03-10 ENCOUNTER — TELEPHONE (OUTPATIENT)
Dept: FAMILY MEDICINE CLINIC | Facility: CLINIC | Age: 63
End: 2023-03-10

## 2023-03-10 NOTE — TELEPHONE ENCOUNTER
Pt called in regards to his CPAP machine and a error with an rx   Asks that he can get a call back from someone to advise

## 2023-03-13 DIAGNOSIS — E11.3293 TYPE 2 DIABETES MELLITUS WITH MILD NONPROLIFERATIVE RETINOPATHY OF BOTH EYES, WITHOUT LONG-TERM CURRENT USE OF INSULIN, MACULAR EDEMA PRESENCE UNSPECIFIED (HCC): Primary | ICD-10-CM

## 2023-03-13 NOTE — TELEPHONE ENCOUNTER
Spoke with patient  He was questioning if we have heard anything about his replacement CPAP machine  I have not  I recommended that he reach out to the company again and ask for an update  He was given out fax number in case he needs to have anything faxed here  He also requested a switch to Ozempic  He has still not been able to try this GLP-1  He notes that he lost some weight with Trulicity but really wants to see how Delories Simpers works for him  He reports his blood sugars have been mostly well-controlled with Trulicity 1 5 mg weekly  A script was sent to the pharmacy for Ozempic 1 mg weekly  He will follow-up as scheduled in about 2 weeks

## 2023-03-13 NOTE — TELEPHONE ENCOUNTER
Date/Time: 3-13-23 / 11:15 AM    Pt called in requesting a phone call from [facility] regarding his CPAP machine and switching to Ozempic  His phone number is [phone]

## 2023-03-20 ENCOUNTER — RA CDI HCC (OUTPATIENT)
Dept: OTHER | Facility: HOSPITAL | Age: 63
End: 2023-03-20

## 2023-03-20 NOTE — PROGRESS NOTES
P[facility]  coding opportunities          Chart Reviewed number of suggestions sent to Provider: 3   [id number] [phone]  [id number] [phone]  [id number] [phone]    Patients Insurance        Commercial Insurance: [employer]

## 2023-03-24 ENCOUNTER — OFFICE VISIT (OUTPATIENT)
Dept: FAMILY MEDICINE CLINIC | Facility: CLINIC | Age: 63
End: 2023-03-24

## 2023-03-24 VITALS
HEART RATE: 100 BPM | TEMPERATURE: 97.3 F | SYSTOLIC BLOOD PRESSURE: 140 MMHG | DIASTOLIC BLOOD PRESSURE: 90 MMHG | WEIGHT: 232.8 LBS | RESPIRATION RATE: 18 BRPM | OXYGEN SATURATION: 99 % | HEIGHT: 70 IN | BODY MASS INDEX: 33.33 KG/M2

## 2023-03-24 DIAGNOSIS — G47.30 SLEEP APNEA, UNSPECIFIED TYPE: ICD-10-CM

## 2023-03-24 DIAGNOSIS — J45.20 MILD INTERMITTENT ASTHMA WITHOUT COMPLICATION: ICD-10-CM

## 2023-03-24 DIAGNOSIS — E66.9 OBESITY (BMI 30-39.9): ICD-10-CM

## 2023-03-24 DIAGNOSIS — E55.9 VITAMIN D DEFICIENCY: ICD-10-CM

## 2023-03-24 DIAGNOSIS — E11.3293 TYPE 2 DIABETES MELLITUS WITH MILD NONPROLIFERATIVE RETINOPATHY OF BOTH EYES, WITHOUT LONG-TERM CURRENT USE OF INSULIN, MACULAR EDEMA PRESENCE UNSPECIFIED (HCC): Primary | ICD-10-CM

## 2023-03-24 DIAGNOSIS — E11.3293 TYPE 2 DIABETES MELLITUS WITH MILD NONPROLIFERATIVE RETINOPATHY OF BOTH EYES, WITHOUT LONG-TERM CURRENT USE OF INSULIN, MACULAR EDEMA PRESENCE UNSPECIFIED (HCC): ICD-10-CM

## 2023-03-24 DIAGNOSIS — E78.5 HYPERLIPIDEMIA, UNSPECIFIED HYPERLIPIDEMIA TYPE: ICD-10-CM

## 2023-03-24 LAB — SL AMB POCT HEMOGLOBIN AIC: 8 (ref ?–6.5)

## 2023-03-24 NOTE — PROGRESS NOTES
Diabetic Foot Exam    Patient's shoes and socks removed  Right Foot/Ankle   Right Foot Inspection  Skin Exam: skin normal and skin intact  No dry skin, no warmth, no callus, no erythema, no maceration, no abnormal color, no pre-ulcer, no ulcer and no callus  Sensory   Monofilament testing: diminished    Vascular  The right DP pulse is 1+  The right PT pulse is 1+  Left Foot/Ankle  Left Foot Inspection  Skin Exam: skin normal and skin intact  No dry skin, no warmth, no erythema, no maceration, normal color, no pre-ulcer, no ulcer and no callus  Sensory   Monofilament testing: diminished    Vascular  The left DP pulse is 1+  The left PT pulse is 1+  Assign Risk Category  No deformity present  Loss of protective sensation  Weak pulses  Risk: 2         FAMILY PRACTICE OFFICE VISIT  [facility] - [facility]       NAME: Kranthi Jiang  AGE: 60 y o  SEX: male       : 1960        MRN: [id number]    DATE: 3/25/2023  TIME: 4:50 PM    Assessment and Plan     Problem List Items Addressed This Visit        Endocrine    Type 2 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, without long-term current use of insulin (HCC) - Primary     Uncontrolled, but was also off of any GLP-1 for at least a month over the last 3 months due to his desire to transition brands  Patient will continue for now with Ozempic 1 mg weekly, metformin 1000 mg twice daily, and Jardiance 25 mg daily  He was encouraged to work on limiting intake of carbohydrates  Foot exam was updated today  He is up-to-date on his eye exam   Follow-up in 3 months  Lab Results   Component Value Date    HGBA1C 8 0 (A) 2023            Relevant Medications    Empagliflozin (Jardiance) 25 MG TABS    Other Relevant Orders    POCT hemoglobin A1c (Completed)       Respiratory    Asthma     Well-controlled  He reports rare albuterol use  We will continue to monitor           Sleep apnea     Continue consistent CPAP use nightly  He is currently still waiting for his replacement  Other    Hyperlipidemia     Patient is not currently on a statin  LDL in September was 81  Patient will work on improving diabetic control  We will look for improved cholesterol readings with improved diabetic control  Obesity (BMI 30-39  9)     BMI Counseling: Body mass index is 33 4 kg/m²  The BMI is above normal  Nutrition recommendations include 3-5 servings of fruits/vegetables daily and moderation in carbohydrate intake  Exercise recommendations include moderate aerobic physical activity for 150 minutes/week and exercising 3-5 times per week  Vitamin D deficiency     Patient currently on 1000 units daily  Level was well controlled in September at 40  Type 2 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, without long-term current use of insulin (HCC)  Uncontrolled, but was also off of any GLP-1 for at least a month over the last 3 months due to his desire to transition brands  Patient will continue for now with Ozempic 1 mg weekly, metformin 1000 mg twice daily, and Jardiance 25 mg daily  He was encouraged to work on limiting intake of carbohydrates  Foot exam was updated today  He is up-to-date on his eye exam   Follow-up in 3 months  Lab Results   Component Value Date    HGBA1C 8 0 (A) 03/24/2023       Asthma  Well-controlled  He reports rare albuterol use  We will continue to monitor  Sleep apnea  Continue consistent CPAP use nightly  He is currently still waiting for his replacement  Hyperlipidemia  Patient is not currently on a statin  LDL in September was 81  Patient will work on improving diabetic control  We will look for improved cholesterol readings with improved diabetic control  Obesity (BMI 30-39  9)  BMI Counseling: Body mass index is 33 4 kg/m²   The BMI is above normal  Nutrition recommendations include 3-5 servings of fruits/vegetables daily and moderation in carbohydrate intake  Exercise recommendations include moderate aerobic physical activity for 150 minutes/week and exercising 3-5 times per week  Vitamin D deficiency  Patient currently on 1000 units daily  Level was well controlled in September at 40  Chief Complaint     Chief Complaint   Patient presents with   • Follow-up       History of Present Illness   Deisy Hester is a 60y o -year-old male who presents for follow-up on chronic conditions  DM2-Ozempic 1 mg weekly, metformin 1000 mg twice daily, and Jardiance 25 mg daily-A1c today is 9 0% - not checking glucose at home - notes he was off the GLP-1 for almost a month with the desire to change brands    Asthma-albuterol use is rare - usually only in the springtime    Sleep apnea-on CPAP - has still been waiting for this to be replaced - uses current machine nightly - and sleeps 4-5 hours a night    Hyperlipidemia - LDL in September was 80  - not on a statin    Vitamin D deficiency - 1000 units daily - level in September was 40 -         Review of Systems   Review of Systems   Constitutional: Negative for chills and fever  Respiratory: Negative for shortness of breath  Cardiovascular: Negative for chest pain, palpitations and leg swelling  Neurological: Negative for dizziness and headaches  Active Problem List     Patient Active Problem List   Diagnosis   • Asthma   • Basal cell carcinoma of skin   • Environmental and seasonal allergies   • Hyperlipidemia   • History of hypertension   • Obesity (BMI 30-39  9)   • Onychomycosis of toenail   • Pannus, abdominal   • Type 2 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, without long-term current use of insulin (HCC)   • Sciatica of left side   • Sleep apnea   • Bilateral impacted cerumen   • Anemia   • Decreased pulse   • Vitamin D deficiency         Past Medical History:  Past Medical History:   Diagnosis Date   • Diabetes mellitus ([facility] [address] )    • Hip pain        Past Surgical History:  Past Surgical History:   Procedure Laterality Date   • EAR SURGERY Right 2014    Basal cancer removal   • GASTRIC BYPASS         Family History:  Family History   Problem Relation Age of Onset   • Diabetes Family    • Hypertension Family    • Heart attack Family        Social History:  Social History     Socioeconomic History   • Marital status: /Civil Union     Spouse name: Not on file   • Number of children: Not on file   • Years of education: Not on file   • Highest education level: Not on file   Occupational History   • Not on file   Tobacco Use   • Smoking status: Former     Types: Cigarettes     Quit date: 1977     Years since quittin 2   • Smokeless tobacco: Never   Vaping Use   • Vaping Use: Never used   Substance and Sexual Activity   • Alcohol use: Yes     Comment: Social    • Drug use: No   • Sexual activity: Not on file   Other Topics Concern   • Not on file   Social History Narrative   • Not on file     Social Determinants of Health     Financial Resource Strain: Not on file   Food Insecurity: Not on file   Transportation Needs: Not on file   Physical Activity: Not on file   Stress: Not on file   Social Connections: Not on file   Intimate Partner Violence: Not on file   Housing Stability: Not on file       Objective     Vitals:    23 0900   BP: 140/90   BP Location: Left arm   Cuff Size: Standard   Pulse: 100   Resp: 18   Temp: (!) 97 3 °F (36 3 °C)   TempSrc: Tympanic   SpO2: 99%   Weight: 106 kg (232 lb 12 8 oz)   Height: 5' 10" (1 778 m)     Wt Readings from Last 3 Encounters:   23 106 kg (232 lb 12 8 oz)   22 101 kg (222 lb)   10/24/22 99 8 kg (220 lb)       Physical Exam  Vitals reviewed  Constitutional:       General: He is not in acute distress  Appearance: Normal appearance  He is well-developed  He is obese  He is not ill-appearing     HENT:      Head: Normocephalic and atraumatic  Right Ear: There is impacted cerumen  Left Ear: Tympanic membrane, ear canal and external ear normal  There is no impacted cerumen  Neck:      Thyroid: No thyromegaly  Cardiovascular:      Rate and Rhythm: Normal rate and regular rhythm  Pulses: Pulses are weak  Dorsalis pedis pulses are 1+ on the right side and 1+ on the left side  Posterior tibial pulses are 1+ on the right side and 1+ on the left side  Heart sounds: Normal heart sounds  No murmur heard  Comments: No carotid bruits noted  Pulmonary:      Effort: Pulmonary effort is normal       Breath sounds: Normal breath sounds  No wheezing, rhonchi or rales  Musculoskeletal:      Cervical back: Neck supple  Right lower leg: No edema  Left lower leg: No edema  Feet:      Right foot:      Skin integrity: No ulcer, skin breakdown, erythema, warmth, callus or dry skin  Left foot:      Skin integrity: No ulcer, skin breakdown, erythema, warmth, callus or dry skin  Lymphadenopathy:      Cervical: No cervical adenopathy  Skin:     General: Skin is warm and dry  Neurological:      Mental Status: He is alert  Psychiatric:         Mood and Affect: Mood normal          Behavior: Behavior normal          Thought Content:  Thought content normal          Judgment: Judgment normal          Pertinent Laboratory/Diagnostic Studies:  Lab Results   Component Value Date    BUN 12 09/24/2022    CREATININE 0 82 09/24/2022    CALCIUM 9 4 09/24/2022     11/18/2017    K 4 5 09/24/2022    CO2 29 09/24/2022     09/24/2022     Lab Results   Component Value Date    ALT 10 09/24/2022    AST 12 09/24/2022    ALKPHOS 68 09/24/2022    BILITOT 0 7 11/18/2017       Lab Results   Component Value Date    WBC 6 9 09/24/2022    HGB 14 7 09/24/2022    HCT 43 9 09/24/2022    MCV 88 5 09/24/2022     09/24/2022     Lab Results   Component Value Date    CHOL 133 11/18/2017     Lab Results   Component Value Date    TRIG 72 09/24/2022     Lab Results   Component Value Date    HDL 48 09/24/2022     Lab Results   Component Value Date    LDLCALC 81 09/24/2022     Lab Results   Component Value Date    HGBA1C 8 0 (A) 03/24/2023       Results for orders placed or performed in visit on 03/24/23   POCT hemoglobin A1c   Result Value Ref Range    Hemoglobin A1C 8 0 (A) 6 5       Orders Placed This Encounter   Procedures   • POCT hemoglobin A1c       ALLERGIES:  No Known Allergies    Current Medications     Current Outpatient Medications   Medication Sig Dispense Refill   • albuterol (ProAir HFA) 90 mcg/act inhaler Inhale 2 puffs every 6 (six) hours as needed for wheezing or shortness of breath 8 5 g 0   • Diclofenac Sodium (VOLTAREN) 1 % Apply 2 g topically 4 (four) times a day 50 g 0   • Empagliflozin (Jardiance) 25 MG TABS Take 1 tablet (25 mg total) by mouth every morning 90 tablet 1   • fluticasone (FLONASE) 50 mcg/act nasal spray SPRAY 2 SPRAYS INTO EACH NOSTRIL DAILY AS NEEDED FOR ALLERGIES  48 mL 2   • metFORMIN (GLUCOPHAGE) 1000 MG tablet TAKE 1 TABLET BY MOUTH TWICE A  tablet 1   • Multiple Vitamin (MULTI-DAY) TABS Take by mouth daily     • semaglutide, 1 mg/dose, (Ozempic) 4 mg/3 mL injection pen Inject 0 75 mL (1 mg total) under the skin once a week 9 mL 1   • cholecalciferol (VITAMIN D3) 1,000 units tablet Take 1 tablet (1,000 Units total) by mouth daily Start after done with the high-dose  90 tablet 3     No current facility-administered medications for this visit           Health Maintenance     Health Maintenance   Topic Date Due   • HIV Screening  Never done   • Annual Physical  02/12/2022   • COVID-19 Vaccine (4 - Booster for Pfizer series) 02/16/2022   • Colorectal Cancer Screening  03/24/2022   • Diabetic Foot Exam  01/21/2023   • PT PLAN OF CARE  02/12/2023   • DM Eye Exam  04/22/2023   • Kidney Health Evaluation: Microalbumin/Creatinine Ratio  05/27/2023   • HEMOGLOBIN A1C  06/24/2023   • Kidney Health Evaluation: GFR  09/24/2023   • Depression Screening  03/24/2024   • BMI: Adult  03/24/2024   • BMI: Followup Plan  03/25/2024   • DTaP,Tdap,and Td Vaccines (2 - Td or Tdap) 09/18/2030   • Hepatitis C Screening  Completed   • Pneumococcal Vaccine: Pediatrics (0 to 5 Years) and At-Risk Patients (6 to 59 Years)  Completed   • Influenza Vaccine  Completed   • HIB Vaccine  Aged Out   • IPV Vaccine  Aged Out   • Hepatitis A Vaccine  Aged Out   • Meningococcal ACWY Vaccine  Aged Out   • HPV Vaccine  Aged Out     Immunization History   Administered Date(s) Administered   • COVID-19 PFIZER VACCINE 0 3 ML IM 02/19/2021, 03/12/2021, 12/22/2021   • INFLUENZA 11/19/2021   • Influenza Quadrivalent Preservative Free 3 years and older IM 10/03/2014, 11/25/2016, 12/13/2017   • Influenza, recombinant, quadrivalent,injectable, preservative free 01/18/2019, 09/16/2019, 09/18/2020, 11/19/2021, 09/23/2022   • Influenza, seasonal, injectable 01/30/2013, 11/29/2013   • Pneumococcal Conjugate Vaccine 20-valent (Pcv20), Polysace 05/27/2022   • Pneumococcal Polysaccharide PPV23 01/18/2019   • Tdap 09/18/2020       Jennifre Joyner PA-C  3/25/2023 4:50 PM  [place] [id number] [facility]

## 2023-05-01 ENCOUNTER — TELEPHONE (OUTPATIENT)
Dept: FAMILY MEDICINE CLINIC | Facility: CLINIC | Age: 63
End: 2023-05-01

## 2023-05-01 NOTE — TELEPHONE ENCOUNTER
Date/Time: 5-1-23 / 10:56 AM    Pt called in stating that he needs a  Script sent to [facility] for CPAP supplies  He would like a phone call when the script has been sent  Please place if appropriate

## 2023-05-03 ENCOUNTER — TELEPHONE (OUTPATIENT)
Dept: FAMILY MEDICINE CLINIC | Facility: CLINIC | Age: 63
End: 2023-05-03

## 2023-05-03 NOTE — TELEPHONE ENCOUNTER
PT needs a prescription for his CPAPA supplies sent to his respiratory therapist,Xiao, at [facility] [place]  Please advise, thank you!

## 2023-05-03 NOTE — TELEPHONE ENCOUNTER
Called PT to go over supplies that are needed but he did not answer  I left a VM advising him to call back

## 2023-05-04 ENCOUNTER — TELEPHONE (OUTPATIENT)
Dept: FAMILY MEDICINE CLINIC | Facility: CLINIC | Age: 63
End: 2023-05-04

## 2023-05-04 NOTE — TELEPHONE ENCOUNTER
Pt called for a CPAP script  He would like a phone call when the script has been sent  Please place if appropriate

## 2023-05-05 ENCOUNTER — TELEPHONE (OUTPATIENT)
Dept: FAMILY MEDICINE CLINIC | Facility: CLINIC | Age: 63
End: 2023-05-05

## 2023-05-05 NOTE — TELEPHONE ENCOUNTER
Pt walked in and provided the fax number for [facility]  He would like the script for the CPAP supplies to be sent there when ordered  The fax number is [phone]     The supplies the pt needs are   The mask & headgear

## 2023-05-07 LAB
DME PARACHUTE DELIVERY DATE REQUESTED: NORMAL
DME PARACHUTE ITEM DESCRIPTION: NORMAL
DME PARACHUTE ITEM DESCRIPTION: NORMAL
DME PARACHUTE ORDER STATUS: NORMAL
DME PARACHUTE SUPPLIER NAME: NORMAL
DME PARACHUTE SUPPLIER PHONE: NORMAL

## 2023-05-17 ENCOUNTER — TELEPHONE (OUTPATIENT)
Dept: FAMILY MEDICINE CLINIC | Facility: CLINIC | Age: 63
End: 2023-05-17

## 2023-05-17 NOTE — TELEPHONE ENCOUNTER
Supriya Orantes called from [address] [facility] to request and itemized script with each of these items listed with the following codes for the Pt CPAP equipment   Must be faxed to [phone] [facility]  [id number]  [facility]  Water tank   Chin strap   Full face mask

## 2023-05-23 DIAGNOSIS — G47.30 SLEEP APNEA, UNSPECIFIED TYPE: Primary | ICD-10-CM

## 2023-06-08 DIAGNOSIS — E11.65 POORLY CONTROLLED DIABETES MELLITUS (HCC): ICD-10-CM

## 2023-06-30 ENCOUNTER — OFFICE VISIT (OUTPATIENT)
Dept: FAMILY MEDICINE CLINIC | Facility: CLINIC | Age: 63
End: 2023-06-30
Payer: COMMERCIAL

## 2023-06-30 VITALS
SYSTOLIC BLOOD PRESSURE: 120 MMHG | BODY MASS INDEX: 32.16 KG/M2 | WEIGHT: 224.6 LBS | OXYGEN SATURATION: 98 % | DIASTOLIC BLOOD PRESSURE: 70 MMHG | TEMPERATURE: 97.3 F | RESPIRATION RATE: 12 BRPM | HEIGHT: 70 IN | HEART RATE: 83 BPM

## 2023-06-30 DIAGNOSIS — E78.5 HYPERLIPIDEMIA, UNSPECIFIED HYPERLIPIDEMIA TYPE: ICD-10-CM

## 2023-06-30 DIAGNOSIS — E55.9 VITAMIN D DEFICIENCY: ICD-10-CM

## 2023-06-30 DIAGNOSIS — J01.90 ACUTE SINUSITIS, RECURRENCE NOT SPECIFIED, UNSPECIFIED LOCATION: ICD-10-CM

## 2023-06-30 DIAGNOSIS — E11.3293 TYPE 2 DIABETES MELLITUS WITH MILD NONPROLIFERATIVE RETINOPATHY OF BOTH EYES, WITHOUT LONG-TERM CURRENT USE OF INSULIN, MACULAR EDEMA PRESENCE UNSPECIFIED (HCC): Primary | ICD-10-CM

## 2023-06-30 DIAGNOSIS — J45.20 MILD INTERMITTENT ASTHMA WITHOUT COMPLICATION: ICD-10-CM

## 2023-06-30 DIAGNOSIS — D64.9 ANEMIA, UNSPECIFIED TYPE: ICD-10-CM

## 2023-06-30 DIAGNOSIS — E66.9 OBESITY (BMI 30-39.9): ICD-10-CM

## 2023-06-30 DIAGNOSIS — G47.30 SLEEP APNEA, UNSPECIFIED TYPE: ICD-10-CM

## 2023-06-30 LAB
LEFT EYE DIABETIC RETINOPATHY: ABNORMAL
LEFT EYE IMAGE QUALITY: ABNORMAL
LEFT EYE MACULAR EDEMA: ABNORMAL
LEFT EYE OTHER RETINOPATHY: ABNORMAL
RIGHT EYE DIABETIC RETINOPATHY: ABNORMAL
RIGHT EYE IMAGE QUALITY: ABNORMAL
RIGHT EYE MACULAR EDEMA: ABNORMAL
RIGHT EYE OTHER RETINOPATHY: ABNORMAL
SEVERITY (EYE EXAM): ABNORMAL
SL AMB POCT HEMOGLOBIN AIC: 8.5 (ref ?–6.5)

## 2023-06-30 PROCEDURE — 83036 HEMOGLOBIN GLYCOSYLATED A1C: CPT | Performed by: PHYSICIAN ASSISTANT

## 2023-06-30 PROCEDURE — 2025F 7 FLD RTA PHOTO W/O RTNOPTHY: CPT | Performed by: PHYSICIAN ASSISTANT

## 2023-06-30 PROCEDURE — 99214 OFFICE O/P EST MOD 30 MIN: CPT | Performed by: PHYSICIAN ASSISTANT

## 2023-06-30 RX ORDER — AMOXICILLIN AND CLAVULANATE POTASSIUM 875; 125 MG/1; MG/1
1 TABLET, FILM COATED ORAL EVERY 12 HOURS SCHEDULED
Qty: 14 TABLET | Refills: 0 | Status: SHIPPED | OUTPATIENT
Start: 2023-06-30 | End: 2023-07-07

## 2023-06-30 RX ORDER — ASCORBATE CALCIUM 500 MG
500 TABLET ORAL DAILY
COMMUNITY

## 2023-06-30 NOTE — PROGRESS NOTES
FAMILY PRACTICE OFFICE VISIT  [facility] - [facility]       NAME: Louise Acosta  AGE: 58 y.o. SEX: male       : 1960        MRN: [id number]    DATE: 7/3/2023  TIME: 1:35 AM    Assessment and Plan     Problem List Items Addressed This Visit        Endocrine    Type 2 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, without long-term current use of insulin ([address]) - Primary     Uncontrolled. Increase Ozempic to 2 mg weekly and continue metformin 1000 mg twice daily plus Jardiance 25 mg daily. Recheck in 3 months with labs prior to that visit. Lab Results   Component Value Date    HGBA1C 8.5 (A) 2023            Relevant Medications    semaglutide, 2 mg/dose, (Ozempic) 8 mg/ mL injection pen    Other Relevant Orders    POCT hemoglobin A1c (Completed)    Albumin / creatinine urine ratio    Comprehensive metabolic panel    Hemoglobin A1C       Respiratory    Asthma     Controlled. Continue albuterol rarely as needed. Sleep apnea     Controlled. Continue CPAP nightly. Other    Hyperlipidemia     Not on a statin. Last LDL was 81. Recheck with labs prior to next visit. Relevant Orders    IRIS Diabetic eye exam (Completed)    Lipid Panel with Direct LDL reflex    Obesity (BMI 30-39. 9)     BMI Counseling: Body mass index is 32.23 kg/m². The BMI is above normal. Nutrition recommendations include moderation in carbohydrate intake. Exercise recommendations include exercising 3-5 times per week. Relevant Orders    TSH, 3rd generation with Free T4 reflex    Anemia     Recheck with labs as ordered. Relevant Orders    CBC and differential    Vitamin D deficiency     Currently on multivitamin. Recheck level prior to next visit.          Relevant Orders    Vitamin D 25 hydroxy   Other Visit Diagnoses     Acute sinusitis, recurrence not specified, unspecified location        Given prescription for Augmentin. Encouraged to use Flonase 2 sprays per nostril daily. Call if worsens or persists. Relevant Medications    amoxicillin-clavulanate (AUGMENTIN) 875-125 mg per tablet          Type 2 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, without long-term current use of insulin (HCC)  Uncontrolled. Increase Ozempic to 2 mg weekly and continue metformin 1000 mg twice daily plus Jardiance 25 mg daily. Recheck in 3 months with labs prior to that visit. Lab Results   Component Value Date    HGBA1C 8.5 (A) 06/30/2023       Asthma  Controlled. Continue albuterol rarely as needed. Sleep apnea  Controlled. Continue CPAP nightly. Anemia  Recheck with labs as ordered. Hyperlipidemia  Not on a statin. Last LDL was 81. Recheck with labs prior to next visit. Obesity (BMI 30-39. 9)  BMI Counseling: Body mass index is 32.23 kg/m². The BMI is above normal. Nutrition recommendations include moderation in carbohydrate intake. Exercise recommendations include exercising 3-5 times per week. Vitamin D deficiency  Currently on multivitamin. Recheck level prior to next visit. Chief Complaint     Chief Complaint   Patient presents with   • Follow-up     3 month follow up   • Cold Like Symptoms     Started 2 weeks ago pt said. History of Present Illness   Louise Acosta is a 58y.o.-year-old male who presents for 3 month follow-up on chronic conditions.      DM2 - on Ozempic 1 mg weekly, metformin 1000 mg twice daily, and Jardiance 25 mg daily - A1c today is 8.5% (8% in March) - has not been able to exercise much lately but notes that he has been consistent with his meds lately      Asthma - albuterol has not been required recently    Sleep apnea - on CPAP nightly - feels rested upon waking     Hyperlipidemia - not on statin - LDL in September was 80    Vitamin D deficiency - on MV - level in September was 44    Notes cold symptoms began 2 weeks ago - was improving but worsening over the last 2 days - has congestion an productive cough - has sore throat but no SOB/wheeze         Review of Systems   Review of Systems   Constitutional: Negative for chills and fever. Respiratory: Negative for shortness of breath. Cardiovascular: Negative for chest pain, palpitations and leg swelling. Neurological: Negative for dizziness and headaches. Active Problem List     Patient Active Problem List   Diagnosis   • Asthma   • Basal cell carcinoma of skin   • Environmental and seasonal allergies   • Hyperlipidemia   • History of hypertension   • Obesity (BMI 30-39. 9)   • Onychomycosis of toenail   • Pannus, abdominal   • Type 2 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, without long-term current use of insulin (HCC)   • Sciatica of left side   • Sleep apnea   • Bilateral impacted cerumen   • Anemia   • Decreased pulse   • Vitamin D deficiency         Past Medical History:  Past Medical History:   Diagnosis Date   • Diabetes mellitus ([address])    • Hip pain        Past Surgical History:  Past Surgical History:   Procedure Laterality Date   • EAR SURGERY Right 2014    Basal cancer removal   • GASTRIC BYPASS         Family History:  Family History   Problem Relation Age of Onset   • Diabetes Family    • Hypertension Family    • Heart attack Family        Social History:  Social History     Socioeconomic History   • Marital status: /Civil Union     Spouse name: Not on file   • Number of children: Not on file   • Years of education: Not on file   • Highest education level: Not on file   Occupational History   • Not on file   Tobacco Use   • Smoking status: Former     Types: Cigarettes     Quit date: 1977     Years since quittin.5   • Smokeless tobacco: Never   Vaping Use   • Vaping Use: Never used   Substance and Sexual Activity   • Alcohol use: Yes     Comment: Social    • Drug use: No   • Sexual activity: Not on file   Other Topics Concern • Not on file   Social History Narrative   • Not on file     Social Determinants of Health     Financial Resource Strain: Not on file   Food Insecurity: Not on file   Transportation Needs: Not on file   Physical Activity: Not on file   Stress: Not on file   Social Connections: Not on file   Intimate Partner Violence: Not on file   Housing Stability: Not on file       Objective     Vitals:    06/30/23 0901   BP: 120/70   BP Location: Left arm   Patient Position: Sitting   Cuff Size: Large   Pulse: 83   Resp: 12   Temp: (!) 97.3 °F (36.3 °C)   TempSrc: Temporal   SpO2: 98%   Weight: 102 kg (224 lb 9.6 oz)   Height: 5' 10" (1.778 m)     Wt Readings from Last 3 Encounters:   06/30/23 102 kg (224 lb 9.6 oz)   03/24/23 106 kg (232 lb 12.8 oz)   12/16/22 101 kg (222 lb)       Physical Exam  Vitals reviewed. Constitutional:       General: He is not in acute distress. Appearance: Normal appearance. He is well-developed. He is obese. He is not ill-appearing. HENT:      Head: Normocephalic and atraumatic. Right Ear: Tympanic membrane, ear canal and external ear normal. There is no impacted cerumen. Left Ear: Tympanic membrane, ear canal and external ear normal. There is no impacted cerumen. Nose: Congestion present. Mouth/Throat:      Mouth: Mucous membranes are moist.      Pharynx: No oropharyngeal exudate or posterior oropharyngeal erythema. Neck:      Thyroid: No thyromegaly. Cardiovascular:      Rate and Rhythm: Normal rate and regular rhythm. Pulses: Normal pulses. Heart sounds: Normal heart sounds. No murmur heard. Comments: No carotid bruits noted  Pulmonary:      Effort: Pulmonary effort is normal.      Breath sounds: Normal breath sounds. No wheezing, rhonchi or rales. Musculoskeletal:      Cervical back: Neck supple. Right lower leg: No edema. Left lower leg: No edema. Lymphadenopathy:      Cervical: No cervical adenopathy.    Skin:     General: Skin is warm and dry. Findings: No rash. Neurological:      Mental Status: He is alert. Psychiatric:         Mood and Affect: Mood normal.         Behavior: Behavior normal.         Thought Content: Thought content normal.         Judgment: Judgment normal.         Pertinent Laboratory/Diagnostic Studies:  Lab Results   Component Value Date    BUN 12 2022    CREATININE 0.82 2022    CALCIUM 9.4 2022     2017    K 4.5 2022    CO2 29 2022     2022     Lab Results   Component Value Date    ALT 10 2022    AST 12 2022    ALKPHOS 68 2022    BILITOT 0.7 2017       Lab Results   Component Value Date    WBC 6.9 2022    HGB 14.7 2022    HCT 43.9 2022    MCV 88.5 2022     2022     Lab Results   Component Value Date    CHOL 133 2017     Lab Results   Component Value Date    TRIG 72 2022     Lab Results   Component Value Date    HDL 48 2022     Lab Results   Component Value Date    LDLCALC 81 2022     Lab Results   Component Value Date    HGBA1C 8.5 (A) 2023       Results for orders placed or performed in visit on 23   IRIS Diabetic eye exam   Result Value Ref Range    Severity ALERT (A)     Right Eye Diabetic Retinopathy None     Right Eye Macular Edema None     Right Eye Other Retinopathy None     Right Eye Image Quality Not Gradable Image (A)     Left Eye Diabetic Retinopathy None     Left Eye Macular Edema None     Left Eye Other Retinopathy None     Left Eye Image Quality Not Gradable Image (A)     Result Retinal Study Result for LELIA PRITCHETT     Result       Result        tarah BLANCHARD 59 y/o, M (: 1960, MRN: [id number])    Result        presented to [address] [place] [address] Primary Care on 2023 for a retinal imaging study of the left and right eyes.     Result       Result        Based on the findings of the study, the following is recommended for Pasquale Brightly Result        Not Gradable Eye(s) Found: Schedule an appointment with a retina specialist for further evaluation within 3 months. Result       Result        Interpreting Provider's Comments:  No comments provided    Result        Diagnoses Present: E785 - Hyperlipidemia, unspecified    Result       Result        Right eye findings: Image not gradable for reasons listed: Insufficient View of Macula    Result       Result        Left eye findings: Image not gradable for reasons listed: Insufficient View of Macula    Result       Result        This result was electronically signed by Davion Chaudhry MD, NPI: [id number], Taxonomy: [id number] on 06- 15:[address] [facility]. Result      Result       NOTE:  Any pathology noted on this diabetic retinal evaluation should be confirmed by an appropriate ophthalmic examination. POCT hemoglobin A1c   Result Value Ref Range    Hemoglobin A1C 8.5 (A) 6.5       Orders Placed This Encounter   Procedures   • IRIS Diabetic eye exam   • Albumin / creatinine urine ratio   • CBC and differential   • Comprehensive metabolic panel   • Hemoglobin A1C   • Lipid Panel with Direct LDL reflex   • TSH, 3rd generation with Free T4 reflex   • Vitamin D 25 hydroxy   • POCT hemoglobin A1c       ALLERGIES:  No Known Allergies    Current Medications     Current Outpatient Medications   Medication Sig Dispense Refill   • albuterol (ProAir HFA) 90 mcg/act inhaler Inhale 2 puffs every 6 (six) hours as needed for wheezing or shortness of breath 8.5 g 0   • amoxicillin-clavulanate (AUGMENTIN) 875-125 mg per tablet Take 1 tablet by mouth every 12 (twelve) hours for 7 days 14 tablet 0   • Calcium Ascorbate (VITAMIN C) 500 mg tablet Take 500 mg by mouth daily     • Empagliflozin (Jardiance) 25 MG TABS Take 1 tablet (25 mg total) by mouth every morning 90 tablet 1   • fluticasone (FLONASE) 50 mcg/act nasal spray SPRAY 2 SPRAYS INTO EACH NOSTRIL DAILY AS NEEDED FOR ALLERGIES.  48 mL 2   • metFORMIN (GLUCOPHAGE) 1000 MG tablet TAKE 1 TABLET BY MOUTH TWICE A  tablet 1   • Multiple Vitamin (MULTI-DAY) TABS Take by mouth daily     • semaglutide, 2 mg/dose, (Ozempic) 8 mg/ mL injection pen Inject 0.75 mL (2 mg total) under the skin every 7 days 9 mL 1   • Diclofenac Sodium (VOLTAREN) 1 % Apply 2 g topically 4 (four) times a day (Patient not taking: Reported on 6/30/2023) 50 g 0     No current facility-administered medications for this visit.          Health Maintenance     Health Maintenance   Topic Date Due   • Annual Physical  02/12/2022   • PT PLAN OF CARE  02/12/2023   • DM Eye Exam  04/22/2023   • Kidney Health Evaluation: Albumin/Creatinine Ratio  05/27/2023   • Influenza Vaccine (1) 09/01/2023   • Kidney Health Evaluation: GFR  09/24/2023   • HEMOGLOBIN A1C  09/30/2023   • HIV Screening  07/30/2023 (Originally 5/22/1975)   • COVID-19 Vaccine (4 - Pfizer series) 09/30/2023 (Originally 2/16/2022)   • Colorectal Cancer Screening  06/30/2024 (Originally 5/22/2004)   • Depression Screening  03/24/2024   • Diabetic Foot Exam  03/25/2024   • BMI: Adult  06/30/2024   • BMI: Followup Plan  07/03/2024   • DTaP,Tdap,and Td Vaccines (2 - Td or Tdap) 09/18/2030   • Hepatitis C Screening  Completed   • Pneumococcal Vaccine: Pediatrics (0 to 5 Years) and At-Risk Patients (6 to 59 Years)  Completed   • HIB Vaccine  Aged Out   • IPV Vaccine  Aged Out   • Hepatitis A Vaccine  Aged Out   • Meningococcal ACWY Vaccine  Aged Out   • HPV Vaccine  Aged Out     Immunization History   Administered Date(s) Administered   • COVID-19 PFIZER VACCINE 0.3 ML IM 02/19/2021, 03/12/2021, 12/22/2021   • INFLUENZA 11/19/2021, 09/23/2022   • Influenza Quadrivalent Preservative Free 3 years and older IM 10/03/2014, 11/25/2016, 12/13/2017   • Influenza, recombinant, quadrivalent,injectable, preservative free 01/18/2019, 09/16/2019, 09/18/2020, 11/19/2021, 09/23/2022   • Influenza, seasonal, injectable 01/30/2013, 11/29/2013   • Pneumococcal Conjugate Vaccine 20-valent (Pcv20), Polysace 05/27/2022   • Pneumococcal Polysaccharide PPV23 01/18/2019   • Tdap 09/18/2020       Lida Lyles PA-C  7/3/2023 1:35 AM  [facility]

## 2023-07-03 NOTE — ASSESSMENT & PLAN NOTE
Uncontrolled. Increase Ozempic to 2 mg weekly and continue metformin 1000 mg twice daily plus Jardiance 25 mg daily. Recheck in 3 months with labs prior to that visit.   Lab Results   Component Value Date    HGBA1C 8.5 (A) 06/30/2023

## 2023-07-03 NOTE — ASSESSMENT & PLAN NOTE
BMI Counseling: Body mass index is 32.23 kg/m². The BMI is above normal. Nutrition recommendations include moderation in carbohydrate intake. Exercise recommendations include exercising 3-5 times per week.

## 2023-08-01 DIAGNOSIS — E11.3293 TYPE 2 DIABETES MELLITUS WITH MILD NONPROLIFERATIVE RETINOPATHY OF BOTH EYES, WITHOUT LONG-TERM CURRENT USE OF INSULIN, MACULAR EDEMA PRESENCE UNSPECIFIED (HCC): ICD-10-CM

## 2023-08-02 NOTE — TELEPHONE ENCOUNTER
Please call pharmacy to see what the closest available strength of Ozempic is (usually on 2 mg weekly).

## 2023-08-03 NOTE — TELEPHONE ENCOUNTER
Pharmacy states they have 1mg of ozempic available. Please let me know what you'd like me to do further.

## 2023-08-03 NOTE — TELEPHONE ENCOUNTER
Please let the patient know that I have sent a prescription for the Ozempic 1 mg strength since the 2 mg strength is not currently available. We can plan to increase back to the 2 mg strength when it is available.

## 2023-08-04 ENCOUNTER — TELEPHONE (OUTPATIENT)
Dept: FAMILY MEDICINE CLINIC | Facility: CLINIC | Age: 63
End: 2023-08-04

## 2023-09-14 LAB
25(OH)D3 SERPL-MCNC: 32 NG/ML (ref 30–100)
ALBUMIN SERPL-MCNC: 4 G/DL (ref 3.6–5.1)
ALBUMIN/CREAT UR: 6 MCG/MG CREAT
ALBUMIN/GLOB SERPL: 1.6 (CALC) (ref 1–2.5)
ALP SERPL-CCNC: 62 U/L (ref 35–144)
ALT SERPL-CCNC: 9 U/L (ref 9–46)
AST SERPL-CCNC: 11 U/L (ref 10–35)
BASOPHILS # BLD AUTO: 41 CELLS/UL (ref 0–200)
BASOPHILS NFR BLD AUTO: 0.6 %
BILIRUB SERPL-MCNC: 0.6 MG/DL (ref 0.2–1.2)
BUN SERPL-MCNC: 13 MG/DL (ref 7–25)
BUN/CREAT SERPL: ABNORMAL (CALC) (ref 6–22)
CALCIUM SERPL-MCNC: 9.1 MG/DL (ref 8.6–10.3)
CHLORIDE SERPL-SCNC: 105 MMOL/L (ref 98–110)
CHOLEST SERPL-MCNC: 146 MG/DL
CHOLEST/HDLC SERPL: 3.2 (CALC)
CO2 SERPL-SCNC: 27 MMOL/L (ref 20–32)
CREAT SERPL-MCNC: 0.87 MG/DL (ref 0.7–1.35)
CREAT UR-MCNC: 68 MG/DL (ref 20–320)
EOSINOPHIL # BLD AUTO: 211 CELLS/UL (ref 15–500)
EOSINOPHIL NFR BLD AUTO: 3.1 %
ERYTHROCYTE [DISTWIDTH] IN BLOOD BY AUTOMATED COUNT: 12.8 % (ref 11–15)
GFR/BSA.PRED SERPLBLD CYS-BASED-ARV: 96 ML/MIN/1.73M2
GLOBULIN SER CALC-MCNC: 2.5 G/DL (CALC) (ref 1.9–3.7)
GLUCOSE SERPL-MCNC: 129 MG/DL (ref 65–99)
HBA1C MFR BLD: 7.5 % OF TOTAL HGB
HCT VFR BLD AUTO: 39.8 % (ref 38.5–50)
HDLC SERPL-MCNC: 46 MG/DL
HGB BLD-MCNC: 13.2 G/DL (ref 13.2–17.1)
LDLC SERPL CALC-MCNC: 80 MG/DL (CALC)
LYMPHOCYTES # BLD AUTO: 1618 CELLS/UL (ref 850–3900)
LYMPHOCYTES NFR BLD AUTO: 23.8 %
MCH RBC QN AUTO: 28 PG (ref 27–33)
MCHC RBC AUTO-ENTMCNC: 33.2 G/DL (ref 32–36)
MCV RBC AUTO: 84.5 FL (ref 80–100)
MICROALBUMIN UR-MCNC: 0.4 MG/DL
MONOCYTES # BLD AUTO: 469 CELLS/UL (ref 200–950)
MONOCYTES NFR BLD AUTO: 6.9 %
NEUTROPHILS # BLD AUTO: 4461 CELLS/UL (ref 1500–7800)
NEUTROPHILS NFR BLD AUTO: 65.6 %
NONHDLC SERPL-MCNC: 100 MG/DL (CALC)
PLATELET # BLD AUTO: 270 THOUSAND/UL (ref 140–400)
PMV BLD REES-ECKER: 9.6 FL (ref 7.5–12.5)
POTASSIUM SERPL-SCNC: 4.3 MMOL/L (ref 3.5–5.3)
PROT SERPL-MCNC: 6.5 G/DL (ref 6.1–8.1)
RBC # BLD AUTO: 4.71 MILLION/UL (ref 4.2–5.8)
SODIUM SERPL-SCNC: 139 MMOL/L (ref 135–146)
TRIGL SERPL-MCNC: 106 MG/DL
TSH SERPL-ACNC: 1.01 MIU/L (ref 0.4–4.5)
WBC # BLD AUTO: 6.8 THOUSAND/UL (ref 3.8–10.8)

## 2023-09-29 ENCOUNTER — RA CDI HCC (OUTPATIENT)
Dept: OTHER | Facility: HOSPITAL | Age: 63
End: 2023-09-29

## 2023-09-29 NOTE — PROGRESS NOTES
[facility] coding opportunities       Chart reviewed, no opportunity found: CHART REVIEWED, NO OPPORTUNITY FOUND        Patients Insurance        Commercial Insurance: [employer]

## 2023-10-03 DIAGNOSIS — E11.3293 TYPE 2 DIABETES MELLITUS WITH MILD NONPROLIFERATIVE RETINOPATHY OF BOTH EYES, WITHOUT LONG-TERM CURRENT USE OF INSULIN, MACULAR EDEMA PRESENCE UNSPECIFIED (HCC): ICD-10-CM

## 2023-10-03 RX ORDER — EMPAGLIFLOZIN 25 MG/1
25 TABLET, FILM COATED ORAL EVERY MORNING
Qty: 90 TABLET | Refills: 1 | Status: SHIPPED | OUTPATIENT
Start: 2023-10-03

## 2023-10-06 ENCOUNTER — OFFICE VISIT (OUTPATIENT)
Dept: FAMILY MEDICINE CLINIC | Facility: CLINIC | Age: 63
End: 2023-10-06
Payer: COMMERCIAL

## 2023-10-06 VITALS
DIASTOLIC BLOOD PRESSURE: 72 MMHG | WEIGHT: 221 LBS | SYSTOLIC BLOOD PRESSURE: 120 MMHG | BODY MASS INDEX: 31.64 KG/M2 | HEART RATE: 90 BPM | HEIGHT: 70 IN | OXYGEN SATURATION: 97 %

## 2023-10-06 DIAGNOSIS — Z23 INFLUENZA VACCINE NEEDED: ICD-10-CM

## 2023-10-06 DIAGNOSIS — E66.9 OBESITY (BMI 30-39.9): ICD-10-CM

## 2023-10-06 DIAGNOSIS — J45.20 MILD INTERMITTENT ASTHMA WITHOUT COMPLICATION: ICD-10-CM

## 2023-10-06 DIAGNOSIS — E11.3293 TYPE 2 DIABETES MELLITUS WITH MILD NONPROLIFERATIVE RETINOPATHY OF BOTH EYES, WITHOUT LONG-TERM CURRENT USE OF INSULIN, MACULAR EDEMA PRESENCE UNSPECIFIED (HCC): Primary | ICD-10-CM

## 2023-10-06 DIAGNOSIS — E78.5 HYPERLIPIDEMIA, UNSPECIFIED HYPERLIPIDEMIA TYPE: ICD-10-CM

## 2023-10-06 DIAGNOSIS — E55.9 VITAMIN D DEFICIENCY: ICD-10-CM

## 2023-10-06 DIAGNOSIS — G47.30 SLEEP APNEA, UNSPECIFIED TYPE: ICD-10-CM

## 2023-10-06 LAB
LEFT EYE DIABETIC RETINOPATHY: ABNORMAL
LEFT EYE IMAGE QUALITY: ABNORMAL
LEFT EYE MACULAR EDEMA: ABNORMAL
LEFT EYE OTHER RETINOPATHY: ABNORMAL
RIGHT EYE DIABETIC RETINOPATHY: ABNORMAL
RIGHT EYE IMAGE QUALITY: ABNORMAL
RIGHT EYE MACULAR EDEMA: ABNORMAL
RIGHT EYE OTHER RETINOPATHY: ABNORMAL
SEVERITY (EYE EXAM): ABNORMAL

## 2023-10-06 PROCEDURE — 99214 OFFICE O/P EST MOD 30 MIN: CPT | Performed by: PHYSICIAN ASSISTANT

## 2023-10-06 PROCEDURE — 90471 IMMUNIZATION ADMIN: CPT

## 2023-10-06 PROCEDURE — 90686 IIV4 VACC NO PRSV 0.5 ML IM: CPT

## 2023-10-06 NOTE — ASSESSMENT & PLAN NOTE
Improved. Patient lost another 3.5 pounds since last visit. He will continue with cycling and continue to strive for a balanced diet. BMI Counseling: Body mass index is 31.71 kg/m². The BMI is above normal. Nutrition recommendations include 3-5 servings of fruits/vegetables daily, moderation in carbohydrate intake and reducing intake of saturated fat and trans fat. Exercise recommendations include moderate aerobic physical activity for 150 minutes/week and exercising 3-5 times per week.

## 2023-10-06 NOTE — ASSESSMENT & PLAN NOTE
Not on a statin. LDL last month was 80. Will continue to limit fatty food intake and work on improved diabetes control.

## 2023-10-06 NOTE — ASSESSMENT & PLAN NOTE
Improved. Patient will check with his pharmacy to see if he is able to get 2 mg strength of Ozempic. Until this is able to be obtained, he will continue with Ozempic 1 mg weekly, metformin 1000 mg twice daily, and Jardiance 25 mg daily. IRIS exam was performed today.    Lab Results   Component Value Date    HGBA1C 7.5 (H) 09/13/2023

## 2023-10-06 NOTE — PROGRESS NOTES
FAMILY PRACTICE OFFICE VISIT  [facility] - [facility]ly [facility]       NAME: Yonny Rivero  AGE: 58 y.o. SEX: male       : 1960        MRN: [id number]    DATE: 10/6/2023  TIME: 10:09 AM    Assessment and Plan     Problem List Items Addressed This Visit        Endocrine    Type 2 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, without long-term current use of insulin ([address]) - Primary     Improved. Patient will check with his pharmacy to see if he is able to get 2 mg strength of Ozempic. Until this is able to be obtained, he will continue with Ozempic 1 mg weekly, metformin 1000 mg twice daily, and Jardiance 25 mg daily. IRIS exam was performed today. Lab Results   Component Value Date    HGBA1C 7.5 (H) 2023            Relevant Orders    IRIS Diabetic eye exam       Respiratory    Asthma     Controlled. Continue rare albuterol use as needed. Sleep apnea     Stable. Continue nightly CPAP use. Other    Hyperlipidemia     Not on a statin. LDL last month was 80. Will continue to limit fatty food intake and work on improved diabetes control. Obesity (BMI 30-39. 9)     Improved. Patient lost another 3.5 pounds since last visit. He will continue with cycling and continue to strive for a balanced diet. BMI Counseling: Body mass index is 31.71 kg/m². The BMI is above normal. Nutrition recommendations include 3-5 servings of fruits/vegetables daily, moderation in carbohydrate intake and reducing intake of saturated fat and trans fat. Exercise recommendations include moderate aerobic physical activity for 150 minutes/week and exercising 3-5 times per week. Vitamin D deficiency     Currently on a multivitamin. Level last month was 32. Will continue to monitor.         Other Visit Diagnoses     Influenza vaccine needed        Relevant Orders    influenza vaccine, quadrivalent, 0.5 mL, preservative-free, for adult and pediatric patients 6 mos+ (AFLURIA, FLUARIX, FLULAVAL, FLUZONE) (Completed)          Type 2 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, without long-term current use of insulin (HCC)  Improved. Patient will check with his pharmacy to see if he is able to get 2 mg strength of Ozempic. Until this is able to be obtained, he will continue with Ozempic 1 mg weekly, metformin 1000 mg twice daily, and Jardiance 25 mg daily. IRIS exam was performed today. Lab Results   Component Value Date    HGBA1C 7.5 (H) 09/13/2023       Asthma  Controlled. Continue rare albuterol use as needed. Sleep apnea  Stable. Continue nightly CPAP use. Hyperlipidemia  Not on a statin. LDL last month was 80. Will continue to limit fatty food intake and work on improved diabetes control. Obesity (BMI 30-39. 9)  Improved. Patient lost another 3.5 pounds since last visit. He will continue with cycling and continue to strive for a balanced diet. BMI Counseling: Body mass index is 31.71 kg/m². The BMI is above normal. Nutrition recommendations include 3-5 servings of fruits/vegetables daily, moderation in carbohydrate intake and reducing intake of saturated fat and trans fat. Exercise recommendations include moderate aerobic physical activity for 150 minutes/week and exercising 3-5 times per week. Vitamin D deficiency  Currently on a multivitamin. Level last month was 32. Will continue to monitor. Chief Complaint     Chief Complaint   Patient presents with   • Follow-up     Follow up   • Flu Vaccine       History of Present Illness   Giovanna García is a 58y.o.-year-old male who presents for follow-up on chronic conditions.      DM2 - on Ozempic 1 mg weekly (increased last visit to 2 mg but had to bump down to 1 mg temporarily due to supply issues), metformin 1000 mg twice daily, and Jardiance 25 mg daily - A1c last month was 7.5%     Asthma - albuterol use is rare      Sleep apnea - on CPAP nightly - wakes rested    Hyperlipidemia - not on statin - LDL in September was 80    Vitamin D deficiency - on MV daily - level in September was 32        Review of Systems   Review of Systems   Constitutional: Negative for chills and fever. Respiratory: Negative for cough, shortness of breath and wheezing. Cardiovascular: Negative for chest pain, palpitations and leg swelling. Gastrointestinal: Negative for diarrhea, nausea and vomiting. Endocrine: Negative for polydipsia. Neurological: Negative for dizziness, syncope and headaches. Active Problem List     Patient Active Problem List   Diagnosis   • Asthma   • Basal cell carcinoma of skin   • Environmental and seasonal allergies   • Hyperlipidemia   • History of hypertension   • Obesity (BMI 30-39. 9)   • Onychomycosis of toenail   • Pannus, abdominal   • Type 2 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, without long-term current use of insulin (HCC)   • Sciatica of left side   • Sleep apnea   • Bilateral impacted cerumen   • Anemia   • Decreased pulse   • Vitamin D deficiency         Past Medical History:  Past Medical History:   Diagnosis Date   • Diabetes mellitus ([address])    • Hip pain        Past Surgical History:  Past Surgical History:   Procedure Laterality Date   • EAR SURGERY Right 2014    Basal cancer removal   • GASTRIC BYPASS         Family History:  Family History   Problem Relation Age of Onset   • Diabetes Family    • Hypertension Family    • Heart attack Family        Social History:  Social History     Socioeconomic History   • Marital status: /Civil Union     Spouse name: Not on file   • Number of children: Not on file   • Years of education: Not on file   • Highest education level: Not on file   Occupational History   • Not on file   Tobacco Use   • Smoking status: Former     Types: Cigarettes     Quit date: 1977     Years since quittin.7   • Smokeless tobacco: Never   Vaping Use   • Vaping Use: Never used   Substance and Sexual Activity   • Alcohol use: Yes     Comment: Social    • Drug use: No   • Sexual activity: Not on file   Other Topics Concern   • Not on file   Social History Narrative   • Not on file     Social Determinants of Health     Financial Resource Strain: Not on file   Food Insecurity: Not on file   Transportation Needs: Not on file   Physical Activity: Not on file   Stress: Not on file   Social Connections: Not on file   Intimate Partner Violence: Not on file   Housing Stability: Not on file       Objective     Vitals:    10/06/23 0913   BP: 120/72   BP Location: Left arm   Patient Position: Sitting   Cuff Size: Adult   Pulse: 90   SpO2: 97%   Weight: 100 kg (221 lb)   Height: 5' 10" (1.778 m)     Wt Readings from Last 3 Encounters:   10/06/23 100 kg (221 lb)   06/30/23 102 kg (224 lb 9.6 oz)   03/24/23 106 kg (232 lb 12.8 oz)       Physical Exam  Vitals reviewed. Constitutional:       General: He is not in acute distress. Appearance: Normal appearance. He is well-developed. He is obese. He is not ill-appearing. HENT:      Head: Normocephalic and atraumatic. Neck:      Thyroid: No thyromegaly. Cardiovascular:      Rate and Rhythm: Normal rate and regular rhythm. Heart sounds: Normal heart sounds. No murmur heard. Comments: No carotid bruits noted  Pulmonary:      Effort: Pulmonary effort is normal.      Breath sounds: Normal breath sounds. No wheezing or rales. Musculoskeletal:      Cervical back: Neck supple. Right lower leg: No edema. Left lower leg: No edema. Lymphadenopathy:      Cervical: No cervical adenopathy. Skin:     General: Skin is warm and dry. Findings: No rash. Neurological:      Mental Status: He is alert. Psychiatric:         Mood and Affect: Mood normal.         Behavior: Behavior normal.         Thought Content:  Thought content normal.         Judgment: Judgment normal.         Pertinent Laboratory/Diagnostic Studies:  Lab Results   Component Value Date    BUN 13 09/13/2023    CREATININE 0.87 09/13/2023    CALCIUM 9.1 09/13/2023     11/18/2017    K 4.3 09/13/2023    CO2 27 09/13/2023     09/13/2023     Lab Results   Component Value Date    ALT 9 09/13/2023    AST 11 09/13/2023    ALKPHOS 62 09/13/2023    BILITOT 0.7 11/18/2017       Lab Results   Component Value Date    WBC 6.8 09/13/2023    HGB 13.2 09/13/2023    HCT 39.8 09/13/2023    MCV 84.5 09/13/2023     09/13/2023     Lab Results   Component Value Date    CHOL 133 11/18/2017     Lab Results   Component Value Date    TRIG 106 09/13/2023     Lab Results   Component Value Date    HDL 46 09/13/2023     Lab Results   Component Value Date    LDLCALC 80 09/13/2023     Lab Results   Component Value Date    HGBA1C 7.5 (H) 09/13/2023       Results for orders placed or performed in visit on 09/13/23   Lipid Panel with Direct LDL reflex   Result Value Ref Range    Total Cholesterol 146 <200 mg/dL    HDL 46 > OR = 40 mg/dL    Triglycerides 106 <150 mg/dL    LDL Calculated 80 mg/dL (calc)    Chol HDLC Ratio 3.2 <5.0 (calc)    Non-HDL Cholesterol 100 <130 mg/dL (calc)   Microalbumin, Random Urine (W/Creatinine)   Result Value Ref Range    Creatinine, Urine 68 20 - 320 mg/dL    Albumin,U,Random 0.4 See Note: mg/dL    Microalb/Creat Ratio 6 <30 mcg/mg creat   Comprehensive metabolic panel   Result Value Ref Range    Glucose, Random 129 (H) 65 - 99 mg/dL    BUN 13 7 - 25 mg/dL    Creatinine 0.87 0.70 - 1.35 mg/dL    eGFR 96 > OR = 60 mL/min/1.73m2    SL AMB BUN/CREATININE RATIO SEE NOTE: 6 - 22 (calc)    Sodium 139 135 - 146 mmol/L    Potassium 4.3 3.5 - 5.3 mmol/L    Chloride 105 98 - 110 mmol/L    CO2 27 20 - 32 mmol/L    Calcium 9.1 8.6 - 10.3 mg/dL    Protein, Total 6.5 6.1 - 8.1 g/dL    Albumin 4.0 3.6 - 5.1 g/dL    Globulin 2.5 1.9 - 3.7 g/dL (calc)    Albumin/Globulin Ratio 1.6 1.0 - 2.5 (calc)    TOTAL BILIRUBIN 0.6 0.2 - 1.2 mg/dL    Alkaline Phosphatase 62 35 - 144 U/L    AST 11 10 - 35 U/L    ALT 9 9 - 46 U/L   CBC and differential   Result Value Ref Range    White Blood Cell Count 6.8 3.8 - 10.8 Thousand/uL    Red Blood Cell Count 4.71 4.20 - 5.80 Million/uL    Hemoglobin 13.2 13.2 - 17.1 g/dL    HCT 39.8 38.5 - 50.0 %    MCV 84.5 80.0 - 100.0 fL    MCH 28.0 27.0 - 33.0 pg    MCHC 33.2 32.0 - 36.0 g/dL    RDW 12.8 11.0 - 15.0 %    Platelet Count 869 596 - 400 Thousand/uL    SL AMB MPV 9.6 7.5 - 12.5 fL    Neutrophils (Absolute) 4,461 1,500 - 7,800 cells/uL    Lymphocytes (Absolute) 1,618 850 - 3,900 cells/uL    Monocytes (Absolute) 469 200 - 950 cells/uL    Eosinophils (Absolute) 211 15 - 500 cells/uL    Basophils ABS 41 0 - 200 cells/uL    Neutrophils 65.6 %    Lymphocytes 23.8 %    Monocytes 6.9 %    Eosinophils 3.1 %    Basophils PCT 0.6 %   TSH, 3rd generation with Free T4 reflex   Result Value Ref Range    TSH W/RFX TO FREE T4 1.01 0.40 - 4.50 mIU/L   Vitamin D 25 hydroxy   Result Value Ref Range    Vitamin D, 25-Hydroxy, Serum 32 30 - 100 ng/mL   Hemoglobin A1c (w/out EAG)   Result Value Ref Range    Hemoglobin A1C 7.5 (H) <5.7 % of total Hgb         ALLERGIES:  No Known Allergies    Current Medications     Current Outpatient Medications   Medication Sig Dispense Refill   • albuterol (ProAir HFA) 90 mcg/act inhaler Inhale 2 puffs every 6 (six) hours as needed for wheezing or shortness of breath 8.5 g 0   • Calcium Ascorbate (VITAMIN C) 500 mg tablet Take 500 mg by mouth daily     • fluticasone (FLONASE) 50 mcg/act nasal spray SPRAY 2 SPRAYS INTO EACH NOSTRIL DAILY AS NEEDED FOR ALLERGIES.  48 mL 2   • Jardiance 25 MG TABS TAKE 1 TABLET BY MOUTH EVERY DAY IN THE MORNING 90 tablet 1   • metFORMIN (GLUCOPHAGE) 1000 MG tablet TAKE 1 TABLET BY MOUTH TWICE A  tablet 1   • Multiple Vitamin (MULTI-DAY) TABS Take by mouth daily     • semaglutide, 1 mg/dose, (Ozempic) 4 mg/3 mL injection pen Inject 0.75 mL (1 mg total) under the skin once a week 9 mL 0   • Diclofenac Sodium (VOLTAREN) 1 % Apply 2 g topically 4 (four) times a day (Patient not taking: Reported on 6/30/2023) 50 g 0     No current facility-administered medications for this visit.          Health Maintenance     Health Maintenance   Topic Date Due   • HIV Screening  Never done   • Annual Physical  02/12/2022   • COVID-19 Vaccine (4 - Pfizer series) 02/16/2022   • PT PLAN OF CARE  02/12/2023   • DM Eye Exam  04/22/2023   • HEMOGLOBIN A1C  12/13/2023   • Colorectal Cancer Screening  06/30/2024 (Originally 5/22/2004)   • Diabetic Foot Exam  03/25/2024   • Kidney Health Evaluation: GFR  09/13/2024   • Kidney Health Evaluation: Albumin/Creatinine Ratio  09/13/2024   • Depression Screening  10/06/2024   • BMI: Followup Plan  10/06/2024   • BMI: Adult  10/06/2024   • DTaP,Tdap,and Td Vaccines (2 - Td or Tdap) 09/18/2030   • Hepatitis C Screening  Completed   • Pneumococcal Vaccine: Pediatrics (0 to 5 Years) and At-Risk Patients (6 to 59 Years)  Completed   • Influenza Vaccine  Completed   • HIB Vaccine  Aged Out   • IPV Vaccine  Aged Out   • Hepatitis A Vaccine  Aged Out   • Meningococcal ACWY Vaccine  Aged Out   • HPV Vaccine  Aged Out     Immunization History   Administered Date(s) Administered   • COVID-19 PFIZER VACCINE 0.3 ML IM 02/19/2021, 03/12/2021, 12/22/2021   • INFLUENZA 11/19/2021, 09/23/2022   • Influenza Quadrivalent Preservative Free 3 years and older IM 10/03/2014, 11/25/2016, 12/13/2017   • Influenza, injectable, quadrivalent, preservative free 0.5 mL 10/06/2023   • Influenza, recombinant, quadrivalent,injectable, preservative free 01/18/2019, 09/16/2019, 09/18/2020, 11/19/2021, 09/23/2022   • Influenza, seasonal, injectable 01/30/2013, 11/29/2013   • Pneumococcal Conjugate Vaccine 20-valent (Pcv20), Polysace 05/27/2022   • Pneumococcal Polysaccharide PPV23 01/18/2019   • Tdap 09/18/2020       Pool Quigley PA-C  10/6/2023 10:09 AM  [facility]

## 2023-11-22 ENCOUNTER — TELEPHONE (OUTPATIENT)
Dept: FAMILY MEDICINE CLINIC | Facility: CLINIC | Age: 63
End: 2023-11-22

## 2023-11-22 DIAGNOSIS — E11.3293 TYPE 2 DIABETES MELLITUS WITH MILD NONPROLIFERATIVE RETINOPATHY OF BOTH EYES, WITHOUT LONG-TERM CURRENT USE OF INSULIN, MACULAR EDEMA PRESENCE UNSPECIFIED (HCC): Primary | ICD-10-CM

## 2023-11-22 NOTE — TELEPHONE ENCOUNTER
Yanira Tony, can you please help troubleshoot what he might be able to get? He had gone down to Ozempic 1 mg weekly since that was what he could get; however, he would preferably be on 2 mg weekly. If neither of these are an option, then we will need to change brands.

## 2023-11-22 NOTE — TELEPHONE ENCOUNTER
11/22 11:55am: PT called to say that Ozempic is out of stock at his [facility]. Please send in script for an equivalent medication. PT can be reached at [phone].

## 2023-11-24 NOTE — TELEPHONE ENCOUNTER
Patient returned phone call. Patient took last Ozempic dose on 11/17, due for dose today; pharmacy was uncertain when they would get Ozempic back in stock. Reviewed that he could miss 1-2 Ozempic doses and likely have minimal side effect when restarting vs reducing to Ozempic 0.5mg/week. Patient would prefer to not wait for Ozempic and is interested in reducing dose to 0.5mg/week and then reassessing Ozempic 1mg pen availability next month     Ozempic order pended for covering PCP. Patient will call PharmD/PCP on availability of Ozempic 1mg pen next month.     Pharmacist Tracking Tool  Reason For Outreach: Embedded Pharmacist  Demographics:  Intervention Method: Phone  Type of Intervention: New  Topics Addressed: Diabetes  Pharmacologic Interventions: Dose or Frequency Adjusted  Non-Pharmacologic Interventions: Care coordination  Time:  Direct Patient Care:  5  mins  Care Coordination:  5  mins  Recommendation Recipient: Patient/Caregiver  Outcome: Accepted

## 2023-11-24 NOTE — TELEPHONE ENCOUNTER
Return VM from patient. Called patient back but unable to reach. Vm left. Of note, Renetta Mcallisterir is not covered under most [employer].

## 2023-12-07 DIAGNOSIS — E11.65 POORLY CONTROLLED DIABETES MELLITUS (HCC): ICD-10-CM

## 2023-12-21 DIAGNOSIS — E11.3293 TYPE 2 DIABETES MELLITUS WITH MILD NONPROLIFERATIVE RETINOPATHY OF BOTH EYES, WITHOUT LONG-TERM CURRENT USE OF INSULIN, MACULAR EDEMA PRESENCE UNSPECIFIED (HCC): ICD-10-CM

## 2024-01-01 NOTE — ASSESSMENT & PLAN NOTE
BMI Counseling: Body mass index is 33 4 kg/m²  The BMI is above normal  Nutrition recommendations include 3-5 servings of fruits/vegetables daily and moderation in carbohydrate intake  Exercise recommendations include moderate aerobic physical activity for 150 minutes/week and exercising 3-5 times per week 
Continue consistent CPAP use nightly  He is currently still waiting for his replacement 
Patient currently on 1000 units daily  Level was well controlled in September at 40 
Patient is not currently on a statin  LDL in September was 81  Patient will work on improving diabetic control  We will look for improved cholesterol readings with improved diabetic control 
Uncontrolled, but was also off of any GLP-1 for at least a month over the last 3 months due to his desire to transition brands  Patient will continue for now with Ozempic 1 mg weekly, metformin 1000 mg twice daily, and Jardiance 25 mg daily  He was encouraged to work on limiting intake of carbohydrates  Foot exam was updated today  He is up-to-date on his eye exam   Follow-up in 3 months    Lab Results   Component Value Date    HGBA1C 8 0 (A) 03/24/2023
Well-controlled  He reports rare albuterol use  We will continue to monitor 
(2) cough or sneeze

## 2024-01-10 ENCOUNTER — TELEPHONE (OUTPATIENT)
Dept: FAMILY MEDICINE CLINIC | Facility: CLINIC | Age: 64
End: 2024-01-10

## 2024-01-10 ENCOUNTER — TELEPHONE (OUTPATIENT)
Age: 64
End: 2024-01-10

## 2024-01-10 ENCOUNTER — TELEMEDICINE (OUTPATIENT)
Dept: FAMILY MEDICINE CLINIC | Facility: CLINIC | Age: 64
End: 2024-01-10
Payer: COMMERCIAL

## 2024-01-10 VITALS — TEMPERATURE: 99 F

## 2024-01-10 DIAGNOSIS — E11.3293 TYPE 2 DIABETES MELLITUS WITH MILD NONPROLIFERATIVE RETINOPATHY OF BOTH EYES, WITHOUT LONG-TERM CURRENT USE OF INSULIN, MACULAR EDEMA PRESENCE UNSPECIFIED (HCC): ICD-10-CM

## 2024-01-10 DIAGNOSIS — U07.1 COVID-19: Primary | ICD-10-CM

## 2024-01-10 PROCEDURE — 99442 PR PHYS/QHP TELEPHONE EVALUATION 11-20 MIN: CPT | Performed by: PHYSICIAN ASSISTANT

## 2024-01-10 RX ORDER — NIRMATRELVIR AND RITONAVIR 300-100 MG
3 KIT ORAL 2 TIMES DAILY
Qty: 30 TABLET | Refills: 0 | Status: SHIPPED | OUTPATIENT
Start: 2024-01-10 | End: 2024-01-15

## 2024-01-10 NOTE — PROGRESS NOTES
COVID-19 Outpatient Progress Note    Assessment/Plan:    Problem List Items Addressed This Visit          Endocrine    Type 2 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, without long-term current use of insulin (HCC)     Patient requested prescription be sent to pharmacy for next dose up of Ozempic to see if he is able to obtain it at this time.  Prescription sent for 1 mg weekly strength of Ozempic.  He was encouraged to reach out with any difficulties in obtaining.  Otherwise he will follow-up as neck scheduled for regular follow-up.  Lab Results   Component Value Date    HGBA1C 7.5 (H) 09/13/2023            Relevant Medications    semaglutide, 1 mg/dose, (Ozempic) 4 mg/3 mL injection pen     Other Visit Diagnoses       COVID-19    -  Primary    Relevant Medications    nirmatrelvir & ritonavir (Paxlovid, 300/100,) tablet therapy pack           Disposition:     Patient with asymptomatic/mild COVID-19: They were recommended to isolate for at least 5 days (day 0 is the day symptoms appeared or the date the specimen was collected for the positive test for people who are asymptomatic). If they are asymptomatic or symptoms are improving with no fevers in the past 24 hours, isolation may be ended followed by 5 days of wearing a high quality mask when around others to minimize risk of infecting others. They should wear a mask through day 10 and a test-based strategy may be used to remove a mask sooner.      Discussed symptom directed medication options with patient.     Patient meets criteria for Paxlovid and they have been counseled appropriately regarding risks, benefits, side effects, and alternative treatment options. After discussion, patient agrees to treatment.    Possible side effects of Paxlovid?    Possible side effects of Paxlovid are:  - Liver Problems. Notify us right away if you start to experience loss of appetite, yellowing of your skin and the whites of eyes (jaundice),  dark-colored urine, pale colored stools and itchy skin, stomach area (abdominal) pain.  - Resistance to HIV Medicines. If you have untreated HIV infection, Paxlovid may lead to some HIV medicines not working as well in the future.  - Other possible side effects include: altered sense of taste, diarrhea, high blood pressure, or muscle aches.    I have spent a total time of 15 minutes on the day of the encounter for this patient including discussing diagnostic results, discussing prognosis, risks and benefits of treatment options, instructions for management, documenting in the medical record and obtaining or reviewing history.       Encounter provider: Danay Hernández PA-C     Provider located at: [facility]  [address] [facility] [address]  [facility] [address] [phone]     Recent Visits  Date Type Provider Dept   01/10/24 Telemedicine Danay Hernández PA-C [facility]   01/10/24 Telephone Russ Johnson [facility]   Showing recent visits within past 7 days and meeting all other requirements  Future Appointments  No visits were found meeting these conditions.  Showing future appointments within next 150 days and meeting all other requirements     This virtual check-in was done via telephone and he agrees to proceed.    Patient agrees to participate in a virtual check in via telephone or video visit instead of presenting to the office to address urgent/immediate medical needs. Patient is aware this is a billable service. He acknowledged consent and understanding of privacy and security of the video platform. The patient has agreed to participate and understands they can discontinue the visit at any time.    After connecting through Telephone, the patient was identified by name and date of birth. Edward Pham was informed that this was a telemedicine visit and that the exam was being conducted confidentially over secure lines.  "My office door was closed. No one else was in the room. Edward Pham acknowledged consent and understanding of privacy and security of the telemedicine visit. I informed the patient that I have reviewed his record in Epic and presented the opportunity for him to ask any questions regarding the visit today. The patient agreed to participate.    It was my intent to perform this visit via video technology but the patient was not able to do a video connection so the visit was completed via audio telephone only.        Verification of patient location:  Patient is located in the following state in which I hold an active license: [place]    Subjective:   Edward Pham is a 63 y.o. male who has been screened for COVID-19. Symptom change since last report: unchanged. Patient's symptoms include fever (up to 99), nasal congestion, rhinorrhea, sore throat (improving), cough (slightly productive) and myalgias. Patient denies chills, anosmia, loss of taste, shortness of breath, chest tightness, abdominal pain, nausea, vomiting, diarrhea and headaches.     - Date of symptom onset: 1/8/2024  - Date of positive COVID-19 test: 1/9/2024. Type of test: Home antigen. Patient with typical symptoms of COVID-19 and they attest that they were positive on home rapid antigen testing. Image of positive result is not able to be uploaded into their chart.     COVID-19 vaccination status: Fully vaccinated with booster    Edward has been staying home and has isolated themselves in his home. He is taking care to not share personal items and is cleaning all surfaces that are touched often, like counters, tabletops, and doorknobs using household cleaning sprays or wipes.     Wearing a mask when leaving room?: is not      Takes Dayquil and Nyquil and Afrin have been used but Afrin not helping too much    No results found for: \"SARSCOV2\", \"TSPPGMX1UFL\", \"SARSCORONAVI\", \"CORONAVIRUSR\", \"SARSCOVAG\", \"SARSCOVAGH\"    Review of Systems   Constitutional:  Positive " for appetite change (decreased) and fever (up to 99). Negative for chills.   HENT:  Positive for congestion, rhinorrhea and sore throat (improving). Negative for ear pain and sinus pressure.    Respiratory:  Positive for cough (slightly productive). Negative for chest tightness, shortness of breath and wheezing.    Gastrointestinal:  Negative for abdominal pain, diarrhea, nausea and vomiting.   Musculoskeletal:  Positive for myalgias.   Neurological:  Negative for headaches.     Current Outpatient Medications on File Prior to Visit   Medication Sig    Calcium Ascorbate (VITAMIN C) 500 mg tablet Take 500 mg by mouth daily    Jardiance 25 MG TABS TAKE 1 TABLET BY MOUTH EVERY DAY IN THE MORNING    metFORMIN (GLUCOPHAGE) 1000 MG tablet TAKE 1 TABLET BY MOUTH TWICE A DAY    Multiple Vitamin (MULTI-DAY) TABS Take by mouth daily    albuterol (ProAir HFA) 90 mcg/act inhaler Inhale 2 puffs every 6 (six) hours as needed for wheezing or shortness of breath (Patient not taking: Reported on 1/10/2024)    Diclofenac Sodium (VOLTAREN) 1 % Apply 2 g topically 4 (four) times a day (Patient not taking: Reported on 6/30/2023)    fluticasone (FLONASE) 50 mcg/act nasal spray SPRAY 2 SPRAYS INTO EACH NOSTRIL DAILY AS NEEDED FOR ALLERGIES. (Patient not taking: Reported on 1/10/2024)       Objective:    Temp 99 °F (37.2 °C) (Oral)        Physical Exam  Danay Hernández PA-C

## 2024-01-10 NOTE — TELEPHONE ENCOUNTER
from [facility] called because Pt requested Paxlovid for testing positive for covid. I informed her Elisabet whom he spoke to earlier incorrectly advised him that we will be sending a script because he needs and appt for us to assess his symptoms.

## 2024-01-10 NOTE — TELEPHONE ENCOUNTER
Pt was unable to join telehealth and is in need of covid medication. He isconcerned since he cannt get on his appt that the med wont be sent in time for when his wife has to leave by. I warm transferred to clerical to assist further

## 2024-01-10 NOTE — TELEPHONE ENCOUNTER
Patient called for follow up regarding request for Paxlovid; I informed him he needed to be scheduled for appointment; scheduled Virtual appt for today with provider.

## 2024-01-14 NOTE — ASSESSMENT & PLAN NOTE
Patient requested prescription be sent to pharmacy for next dose up of Ozempic to see if he is able to obtain it at this time.  Prescription sent for 1 mg weekly strength of Ozempic.  He was encouraged to reach out with any difficulties in obtaining.  Otherwise he will follow-up as neck scheduled for regular follow-up.  Lab Results   Component Value Date    HGBA1C 7.5 (H) 09/13/2023

## 2024-01-16 ENCOUNTER — TELEPHONE (OUTPATIENT)
Dept: FAMILY MEDICINE CLINIC | Facility: CLINIC | Age: 64
End: 2024-01-16

## 2024-01-16 NOTE — TELEPHONE ENCOUNTER
Called patient to review, unable to reach. VM left.     Lab Results   Component Value Date    HGBA1C 7.5 (H) 09/13/2023     Patient is overdue for A1c; canceled PCP appointment in 3/2024. Would recommend updating A1c prior to converting to alternate GLP1 to assess for appropriate dose.

## 2024-02-21 PROBLEM — H61.23 BILATERAL IMPACTED CERUMEN: Status: RESOLVED | Noted: 2019-06-13 | Resolved: 2024-02-21

## 2024-03-12 ENCOUNTER — APPOINTMENT (OUTPATIENT)
Dept: RADIOLOGY | Facility: MEDICAL CENTER | Age: 64
End: 2024-03-12
Payer: COMMERCIAL

## 2024-03-12 ENCOUNTER — OFFICE VISIT (OUTPATIENT)
Dept: FAMILY MEDICINE CLINIC | Facility: CLINIC | Age: 64
End: 2024-03-12
Payer: COMMERCIAL

## 2024-03-12 VITALS
OXYGEN SATURATION: 97 % | DIASTOLIC BLOOD PRESSURE: 70 MMHG | WEIGHT: 220 LBS | HEART RATE: 91 BPM | SYSTOLIC BLOOD PRESSURE: 120 MMHG | BODY MASS INDEX: 31.57 KG/M2

## 2024-03-12 DIAGNOSIS — E11.3293 TYPE 2 DIABETES MELLITUS WITH MILD NONPROLIFERATIVE RETINOPATHY OF BOTH EYES, WITHOUT LONG-TERM CURRENT USE OF INSULIN, MACULAR EDEMA PRESENCE UNSPECIFIED (HCC): Primary | ICD-10-CM

## 2024-03-12 DIAGNOSIS — M77.8 RIGHT WRIST TENDONITIS: ICD-10-CM

## 2024-03-12 PROCEDURE — 73110 X-RAY EXAM OF WRIST: CPT

## 2024-03-12 PROCEDURE — 99214 OFFICE O/P EST MOD 30 MIN: CPT | Performed by: FAMILY MEDICINE

## 2024-03-12 NOTE — ASSESSMENT & PLAN NOTE
Increase Ozempic to 2mg follow up 2 months with regular PCP    Lab Results   Component Value Date    HGBA1C 7.5 (H) 09/13/2023

## 2024-03-12 NOTE — PROGRESS NOTES
Assessment/Plan:       Problem List Items Addressed This Visit          Endocrine    Type 2 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, without long-term current use of insulin ([facility]) - Primary     Increase Ozempic to 2mg follow up 2 months with regular PCP    Lab Results   Component Value Date    HGBA1C 7.5 (H) 09/13/2023            Relevant Medications    semaglutide, 2 mg/dose, (Ozempic) 8 mg/ mL injection pen       Musculoskeletal and Integument    Right wrist tendonitis     Advised brace check xray, patient did have carpal tunnel in this wrist, exam today more compatible with arthritis/tendonitis.         Relevant Orders    XR wrist 3+ vw right         Subjective:      Patient ID: Edward Pham is a 63 y.o. male.    HPI    63 year old presenting for two weeks of wrist pain.    Pain worse with movement of wrist, no tingling or numbness.    Had carpal tunnel surgery on this wrist many years ago.    No known injury.    Does have repetive movements as .    On ozempic 1mg, tolerating well, last A1c 7.5    The following portions of the patient's history were reviewed and updated as appropriate: allergies, current medications, past family history, past medical history, past social history, past surgical history and problem list.      Current Outpatient Medications:     Calcium Ascorbate (VITAMIN C) 500 mg tablet, Take 500 mg by mouth daily, Disp: , Rfl:     Jardiance 25 MG TABS, TAKE 1 TABLET BY MOUTH EVERY DAY IN THE MORNING, Disp: 90 tablet, Rfl: 1    metFORMIN (GLUCOPHAGE) 1000 MG tablet, TAKE 1 TABLET BY MOUTH TWICE A DAY, Disp: 180 tablet, Rfl: 1    Multiple Vitamin (MULTI-DAY) TABS, Take by mouth daily, Disp: , Rfl:     semaglutide, 1 mg/dose, (Ozempic) 4 mg/3 mL injection pen, Inject 0.75 mL (1 mg total) under the skin once a week, Disp: 3 mL, Rfl: 1    semaglutide, 2 mg/dose, (Ozempic) 8 mg/ mL injection pen, Inject 0.75 mL (2 mg total) under the skin once a week For  diabetes, Disp: 9 mL, Rfl: 1    albuterol (ProAir HFA) 90 mcg/act inhaler, Inhale 2 puffs every 6 (six) hours as needed for wheezing or shortness of breath (Patient not taking: Reported on 1/10/2024), Disp: 8.5 g, Rfl: 0    Diclofenac Sodium (VOLTAREN) 1 %, Apply 2 g topically 4 (four) times a day (Patient not taking: Reported on 6/30/2023), Disp: 50 g, Rfl: 0    fluticasone (FLONASE) 50 mcg/act nasal spray, SPRAY 2 SPRAYS INTO EACH NOSTRIL DAILY AS NEEDED FOR ALLERGIES. (Patient not taking: Reported on 1/10/2024), Disp: 48 mL, Rfl: 2     Review of Systems   Constitutional:  Negative for activity change and appetite change.   Respiratory:  Negative for apnea and chest tightness.    Cardiovascular:  Negative for chest pain and palpitations.   Gastrointestinal:  Negative for abdominal distention and abdominal pain.   Musculoskeletal:  Negative for arthralgias and back pain.         Objective:      /70 (BP Location: Left arm, Patient Position: Sitting, Cuff Size: Large)   Pulse 91   Wt 99.8 kg (220 lb)   SpO2 97%   BMI 31.57 kg/m²          Physical Exam  Constitutional:       Appearance: Normal appearance.   Cardiovascular:      Rate and Rhythm: Normal rate.   Pulmonary:      Effort: Pulmonary effort is normal.   Abdominal:      General: Abdomen is flat.      Tenderness: There is no abdominal tenderness.   Musculoskeletal:      Right wrist: Tenderness present. No bony tenderness or snuff box tenderness. Decreased range of motion.      Left wrist: Normal.      Comments: Negative grind test, negative phalen/tinnels, pain in wrist with movement generalized,    Neurological:      Mental Status: He is alert.           Porter Blackmon MD

## 2024-03-12 NOTE — ASSESSMENT & PLAN NOTE
Advised brace check xray, patient did have carpal tunnel in this wrist, exam today more compatible with arthritis/tendonitis.

## 2024-03-26 DIAGNOSIS — E11.3293 TYPE 2 DIABETES MELLITUS WITH MILD NONPROLIFERATIVE RETINOPATHY OF BOTH EYES, WITHOUT LONG-TERM CURRENT USE OF INSULIN, MACULAR EDEMA PRESENCE UNSPECIFIED (HCC): ICD-10-CM

## 2024-03-26 RX ORDER — EMPAGLIFLOZIN 25 MG/1
25 TABLET, FILM COATED ORAL EVERY MORNING
Qty: 30 TABLET | Refills: 0 | Status: SHIPPED | OUTPATIENT
Start: 2024-03-26

## 2024-04-22 DIAGNOSIS — E11.3293 TYPE 2 DIABETES MELLITUS WITH MILD NONPROLIFERATIVE RETINOPATHY OF BOTH EYES, WITHOUT LONG-TERM CURRENT USE OF INSULIN, MACULAR EDEMA PRESENCE UNSPECIFIED (HCC): ICD-10-CM

## 2024-04-22 RX ORDER — EMPAGLIFLOZIN 25 MG/1
25 TABLET, FILM COATED ORAL EVERY MORNING
Qty: 30 TABLET | Refills: 5 | Status: SHIPPED | OUTPATIENT
Start: 2024-04-22

## 2024-05-21 ENCOUNTER — OCCMED (OUTPATIENT)
Dept: URGENT CARE | Facility: CLINIC | Age: 64
End: 2024-05-21

## 2024-05-21 DIAGNOSIS — Z02.83 ENCOUNTER FOR DRUG SCREENING: Primary | ICD-10-CM

## 2024-05-24 ENCOUNTER — OFFICE VISIT (OUTPATIENT)
Dept: FAMILY MEDICINE CLINIC | Facility: CLINIC | Age: 64
End: 2024-05-24
Payer: COMMERCIAL

## 2024-05-24 VITALS
WEIGHT: 226.6 LBS | SYSTOLIC BLOOD PRESSURE: 120 MMHG | OXYGEN SATURATION: 97 % | HEART RATE: 84 BPM | TEMPERATURE: 98.6 F | BODY MASS INDEX: 32.51 KG/M2 | DIASTOLIC BLOOD PRESSURE: 76 MMHG

## 2024-05-24 DIAGNOSIS — E11.3293 TYPE 2 DIABETES MELLITUS WITH MILD NONPROLIFERATIVE RETINOPATHY OF BOTH EYES, WITHOUT LONG-TERM CURRENT USE OF INSULIN, MACULAR EDEMA PRESENCE UNSPECIFIED (HCC): Primary | ICD-10-CM

## 2024-05-24 DIAGNOSIS — E78.5 HYPERLIPIDEMIA, UNSPECIFIED HYPERLIPIDEMIA TYPE: ICD-10-CM

## 2024-05-24 DIAGNOSIS — Z63.0 MARITAL STRESS: ICD-10-CM

## 2024-05-24 DIAGNOSIS — E55.9 VITAMIN D DEFICIENCY: ICD-10-CM

## 2024-05-24 DIAGNOSIS — D64.9 ANEMIA, UNSPECIFIED TYPE: ICD-10-CM

## 2024-05-24 DIAGNOSIS — E66.9 OBESITY (BMI 30-39.9): ICD-10-CM

## 2024-05-24 DIAGNOSIS — B35.1 ONYCHOMYCOSIS OF TOENAIL: ICD-10-CM

## 2024-05-24 DIAGNOSIS — G47.30 SLEEP APNEA, UNSPECIFIED TYPE: ICD-10-CM

## 2024-05-24 LAB
CREAT UR-MCNC: 55.6 MG/DL
MICROALBUMIN UR-MCNC: <7 MG/L
MICROALBUMIN/CREAT 24H UR: <13 MG/G CREATININE (ref 0–30)
SL AMB POCT HEMOGLOBIN AIC: 7.9 (ref ?–6.5)

## 2024-05-24 PROCEDURE — 82043 UR ALBUMIN QUANTITATIVE: CPT | Performed by: PHYSICIAN ASSISTANT

## 2024-05-24 PROCEDURE — 99214 OFFICE O/P EST MOD 30 MIN: CPT | Performed by: PHYSICIAN ASSISTANT

## 2024-05-24 PROCEDURE — 83036 HEMOGLOBIN GLYCOSYLATED A1C: CPT | Performed by: PHYSICIAN ASSISTANT

## 2024-05-24 PROCEDURE — 82570 ASSAY OF URINE CREATININE: CPT | Performed by: PHYSICIAN ASSISTANT

## 2024-05-24 RX ORDER — GLIPIZIDE 2.5 MG/1
2.5 TABLET, EXTENDED RELEASE ORAL DAILY
Qty: 100 TABLET | Refills: 1 | Status: SHIPPED | OUTPATIENT
Start: 2024-05-24

## 2024-05-24 SDOH — SOCIAL STABILITY - SOCIAL INSECURITY: PROBLEMS IN RELATIONSHIP WITH SPOUSE OR PARTNER: Z63.0

## 2024-05-24 NOTE — ASSESSMENT & PLAN NOTE
BMI Counseling: Body mass index is 32.51 kg/m². The BMI is above normal. Nutrition recommendations include decreasing overall calorie intake and moderation in carbohydrate intake. Exercise recommendations include exercising 3-5 times per week.

## 2024-05-24 NOTE — ASSESSMENT & PLAN NOTE
Uncontrolled. Patient will continue Ozempic 2 mg weekly, Jardiance 25 mg daily, metformin 1000 mg twice daily and add on glipizide 2.5 mg daily. Discussed benefit of testing glucose at home. I suggested CGM for increased ease of testing. Patient would like to hold off on CGM and other testing supplies in general for now. He declined referral to DM Education. He was open to a consult with Endocrinology. He was encouraged to limit carb intake. He should follow-up in 3 months. He was encouraged to call with any concerns in the interim.   Lab Results   Component Value Date    HGBA1C 7.9 (A) 05/24/2024

## 2024-05-24 NOTE — PROGRESS NOTES
Diabetic Foot Exam    Patient's shoes and socks removed.    Right Foot/Ankle   Right Foot Inspection  Skin Exam: skin normal and skin intact. No dry skin, no warmth, no callus, no erythema, no maceration, no abnormal color, no pre-ulcer, no ulcer and no callus.     Toe Exam: ROM and strength within normal limits.     Sensory   Monofilament testing: diminished    Vascular  The right DP pulse is 2+. The right PT pulse is 2+.     Left Foot/Ankle  Left Foot Inspection  Skin Exam: skin normal and skin intact. No dry skin, no warmth, no erythema, no maceration, normal color, no pre-ulcer, no ulcer and no callus.     Toe Exam: ROM and strength within normal limits.     Sensory   Monofilament testing: diminished    Vascular  The left DP pulse is 2+. The left PT pulse is 2+.     Assign Risk Category  No deformity present  Loss of protective sensation  No weak pulses  Risk: 1               FAMILY PRACTICE OFFICE VISIT  [facility]       NAME: Edward Pham  AGE: 64 y.o. SEX: male       : 1960        MRN: [id number]    DATE: 2024  TIME: 5:19 PM    Assessment and Plan     Problem List Items Addressed This Visit          Respiratory    Sleep apnea     Continue nightly CPAP.            Endocrine    Type 2 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, without long-term current use of insulin (HCC) - Primary     Uncontrolled. Patient will continue Ozempic 2 mg weekly, Jardiance 25 mg daily, metformin 1000 mg twice daily and add on glipizide 2.5 mg daily. Discussed benefit of testing glucose at home. I suggested CGM for increased ease of testing. Patient would like to hold off on CGM and other testing supplies in general for now. He declined referral to DM Education. He was open to a consult with Endocrinology. He was encouraged to limit carb intake. He should follow-up in 3 months. He was encouraged to call with any concerns in the interim.    Lab Results   Component Value Date    HGBA1C 7.9 (A) 05/24/2024            Relevant Medications    glipiZIDE (GLUCOTROL XL) 2.5 mg 24 hr tablet    Other Relevant Orders    POCT hemoglobin A1c (Completed)    Ambulatory referral to Endocrinology    Hemoglobin A1C    Comprehensive metabolic panel    CBC and differential    TSH, 3rd generation with Free T4 reflex    Albumin / creatinine urine ratio       Musculoskeletal and Integument    Onychomycosis of toenail     Discussed treatment options. Patient will consider Lamisil after doing labs to evaluate current baseline LFTs.            Blood    Anemia     Recheck with labs prior to next visit.            Other    Hyperlipidemia     Recheck lipid panel.          Relevant Orders    Lipid Panel with Direct LDL reflex    Obesity (BMI 30-39.9)     BMI Counseling: Body mass index is 32.51 kg/m². The BMI is above normal. Nutrition recommendations include decreasing overall calorie intake and moderation in carbohydrate intake. Exercise recommendations include exercising 3-5 times per week.           Vitamin D deficiency     Currently supplementing with MV. Recheck level.         Relevant Orders    Vitamin D 25 hydroxy     Other Visit Diagnoses       Marital stress        Relevant Orders    Ambulatory referral to Psych Services            Type 2 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, without long-term current use of insulin (HCC)  Uncontrolled. Patient will continue Ozempic 2 mg weekly, Jardiance 25 mg daily, metformin 1000 mg twice daily and add on glipizide 2.5 mg daily. Discussed benefit of testing glucose at home. I suggested CGM for increased ease of testing. Patient would like to hold off on CGM and other testing supplies in general for now. He declined referral to DM Education. He was open to a consult with Endocrinology. He was encouraged to limit carb intake. He should follow-up in 3 months. He was encouraged to call with any concerns  in the interim.   Lab Results   Component Value Date    HGBA1C 7.9 (A) 05/24/2024       Hyperlipidemia  Recheck lipid panel.     Obesity (BMI 30-39.9)  BMI Counseling: Body mass index is 32.51 kg/m². The BMI is above normal. Nutrition recommendations include decreasing overall calorie intake and moderation in carbohydrate intake. Exercise recommendations include exercising 3-5 times per week.      Vitamin D deficiency  Currently supplementing with MV. Recheck level.    Anemia  Recheck with labs prior to next visit.    Onychomycosis of toenail  Discussed treatment options. Patient will consider Lamisil after doing labs to evaluate current baseline LFTs.    Sleep apnea  Continue nightly CPAP.            Chief Complaint     Chief Complaint   Patient presents with    Follow-up       History of Present Illness   Edward Pham is a 64 y.o.-year-old male who presents for follow-up on chronic conditions.     DM2 - on Ozempic 2 mg weekly, metformin 1000 mg twice daily, and Jardiance 25 mg daily - A1c today is 7.9% - notes that he is not checking glucose at home - notes diet is poor - reports that he had teeth removed so he struggles with certain foods (no more steak for example)    Asthma - albuterol use is rare    Sleep apnea - on CPAP nightly    Hyperlipidemia - not on statin - LDL in September was 81    Vitamin D deficiency - on MV daily - level in September was 32          Review of Systems   Review of Systems   Constitutional:  Negative for chills and fever.   Respiratory:  Negative for shortness of breath.    Cardiovascular:  Negative for chest pain, palpitations and leg swelling.   Neurological:  Negative for dizziness and headaches.       Active Problem List     Patient Active Problem List   Diagnosis    Asthma    Basal cell carcinoma of skin    Environmental and seasonal allergies    Hyperlipidemia    History of hypertension    Obesity (BMI 30-39.9)    Onychomycosis of toenail    Pannus, abdominal    Type 2 diabetes  mellitus with mild nonproliferative retinopathy of both eyes, without long-term current use of insulin (HCC)    Sciatica of left side    Sleep apnea    Anemia    Decreased pulse    Vitamin D deficiency    Right wrist tendonitis         Past Medical History:  Past Medical History:   Diagnosis Date    Diabetes mellitus (HCC)     Hip pain        Past Surgical History:  Past Surgical History:   Procedure Laterality Date    EAR SURGERY Right 2014    Basal cancer removal    GASTRIC BYPASS         Family History:  Family History   Problem Relation Age of Onset    Diabetes Family     Hypertension Family     Heart attack Family        Social History:  Social History     Socioeconomic History    Marital status: /Civil Union     Spouse name: Not on file    Number of children: Not on file    Years of education: Not on file    Highest education level: Not on file   Occupational History    Not on file   Tobacco Use    Smoking status: Former     Current packs/day: 0.00     Types: Cigarettes     Quit date: 1977     Years since quittin.4    Smokeless tobacco: Never   Vaping Use    Vaping status: Never Used   Substance and Sexual Activity    Alcohol use: Yes     Comment: Social     Drug use: No    Sexual activity: Not on file   Other Topics Concern    Not on file   Social History Narrative    Not on file     Social Determinants of Health     Financial Resource Strain: Not on file   Food Insecurity: Not on file   Transportation Needs: Not on file   Physical Activity: Not on file   Stress: Not on file   Social Connections: Not on file   Intimate Partner Violence: Not on file   Housing Stability: Not on file       Objective     Vitals:    24 1600 24 1705   BP: 140/100 120/76   BP Location: Left arm    Cuff Size: Standard    Pulse: 84    Temp: 98.6 °F (37 °C)    TempSrc: Tympanic    SpO2: 97%    Weight: 103 kg (226 lb 9.6 oz)      Wt Readings from Last 3 Encounters:   24 103 kg (226 lb 9.6 oz)    03/12/24 99.8 kg (220 lb)   10/06/23 100 kg (221 lb)       Physical Exam  Vitals reviewed.   Constitutional:       General: He is not in acute distress.     Appearance: Normal appearance. He is well-developed. He is obese. He is not ill-appearing.   HENT:      Head: Normocephalic and atraumatic.   Neck:      Thyroid: No thyromegaly.      Vascular: No carotid bruit.   Cardiovascular:      Rate and Rhythm: Normal rate and regular rhythm.      Pulses: no weak pulses.           Dorsalis pedis pulses are 2+ on the right side and 2+ on the left side.        Posterior tibial pulses are 2+ on the right side and 2+ on the left side.      Heart sounds: Normal heart sounds. No murmur heard.  Pulmonary:      Effort: Pulmonary effort is normal.      Breath sounds: Normal breath sounds. No wheezing, rhonchi or rales.   Musculoskeletal:      Cervical back: Neck supple.      Right lower leg: No edema.      Left lower leg: No edema.        Feet:    Feet:      Right foot:      Skin integrity: No ulcer, skin breakdown, erythema, warmth, callus or dry skin.      Left foot:      Skin integrity: No ulcer, skin breakdown, erythema, warmth, callus or dry skin.   Lymphadenopathy:      Cervical: No cervical adenopathy.   Skin:     General: Skin is warm and dry.      Comments: Thick, discolored toenails scattered on feet B/L   Neurological:      Mental Status: He is alert.   Psychiatric:         Mood and Affect: Mood normal.         Behavior: Behavior normal.         Thought Content: Thought content normal.         Judgment: Judgment normal.         Pertinent Laboratory/Diagnostic Studies:  Lab Results   Component Value Date    BUN 13 09/13/2023    CREATININE 0.87 09/13/2023    CALCIUM 9.1 09/13/2023     11/18/2017    K 4.3 09/13/2023    CO2 27 09/13/2023     09/13/2023     Lab Results   Component Value Date    ALT 9 09/13/2023    AST 11 09/13/2023    ALKPHOS 62 09/13/2023    BILITOT 0.7 11/18/2017       Lab Results   Component  Value Date    WBC 6.8 09/13/2023    HGB 13.2 09/13/2023    HCT 39.8 09/13/2023    MCV 84.5 09/13/2023     09/13/2023     Lab Results   Component Value Date    CHOL 133 11/18/2017     Lab Results   Component Value Date    TRIG 106 09/13/2023     Lab Results   Component Value Date    HDL 46 09/13/2023     Lab Results   Component Value Date    LDLCALC 80 09/13/2023     Lab Results   Component Value Date    HGBA1C 7.9 (A) 05/24/2024       Results for orders placed or performed in visit on 05/24/24   POCT hemoglobin A1c   Result Value Ref Range    Hemoglobin A1C 7.9 (A) 6.5       Orders Placed This Encounter   Procedures    Hemoglobin A1C    Lipid Panel with Direct LDL reflex    Comprehensive metabolic panel    CBC and differential    TSH, 3rd generation with Free T4 reflex    Albumin / creatinine urine ratio    Vitamin D 25 hydroxy    Ambulatory referral to Endocrinology    Ambulatory referral to Psych Services    POCT hemoglobin A1c       ALLERGIES:  No Known Allergies    Current Medications     Current Outpatient Medications   Medication Sig Dispense Refill    Calcium Ascorbate (VITAMIN C) 500 mg tablet Take 500 mg by mouth daily      Empagliflozin (Jardiance) 25 MG TABS TAKE 1 TABLET BY MOUTH EVERY DAY IN THE MORNING 30 tablet 5    glipiZIDE (GLUCOTROL XL) 2.5 mg 24 hr tablet Take 1 tablet (2.5 mg total) by mouth daily 100 tablet 1    metFORMIN (GLUCOPHAGE) 1000 MG tablet TAKE 1 TABLET BY MOUTH TWICE A  tablet 1    Multiple Vitamin (MULTI-DAY) TABS Take by mouth daily      semaglutide, 2 mg/dose, (Ozempic) 8 mg/ mL injection pen Inject 0.75 mL (2 mg total) under the skin once a week For diabetes 9 mL 1    albuterol (ProAir HFA) 90 mcg/act inhaler Inhale 2 puffs every 6 (six) hours as needed for wheezing or shortness of breath (Patient not taking: Reported on 1/10/2024) 8.5 g 0    Diclofenac Sodium (VOLTAREN) 1 % Apply 2 g topically 4 (four) times a day (Patient not taking: Reported on 6/30/2023) 50 g 0     fluticasone (FLONASE) 50 mcg/act nasal spray SPRAY 2 SPRAYS INTO EACH NOSTRIL DAILY AS NEEDED FOR ALLERGIES. (Patient not taking: Reported on 1/10/2024) 48 mL 2     No current facility-administered medications for this visit.         Health Maintenance     Health Maintenance   Topic Date Due    HIV Screening  Never done    Zoster Vaccine (1 of 2) Never done    RSV Vaccine Age 60+ Years (1 - 1-dose 60+ series) Never done    Annual Physical  02/12/2022    PT PLAN OF CARE  02/12/2023    COVID-19 Vaccine (4 - 2023-24 season) 09/01/2023    Diabetic Foot Exam  03/24/2024    Fall Risk  05/22/2024    HEMOGLOBIN A1C  08/24/2024    Colorectal Cancer Screening  06/30/2024 (Originally 5/22/2004)    Kidney Health Evaluation: GFR  09/13/2024    Kidney Health Evaluation: Albumin/Creatinine Ratio  09/13/2024    DM Eye Exam  10/06/2024    Depression Screening  05/24/2025    DTaP,Tdap,and Td Vaccines (2 - Td or Tdap) 09/18/2030    Hepatitis C Screening  Completed    Pneumococcal Vaccine: 65+ Years  Completed    Influenza Vaccine  Completed    RSV Vaccine age 0-20 Months  Aged Out    HIB Vaccine  Aged Out    IPV Vaccine  Aged Out    Hepatitis A Vaccine  Aged Out    Meningococcal ACWY Vaccine  Aged Out    HPV Vaccine  Aged Out     Immunization History   Administered Date(s) Administered    COVID-19 PFIZER VACCINE 0.3 ML IM 02/19/2021, 03/12/2021, 12/22/2021    INFLUENZA 11/19/2021, 09/23/2022    Influenza Quadrivalent Preservative Free 3 years and older IM 10/03/2014, 11/25/2016, 12/13/2017    Influenza, injectable, quadrivalent, preservative free 0.5 mL 10/06/2023    Influenza, recombinant, quadrivalent,injectable, preservative free 01/18/2019, 09/16/2019, 09/18/2020, 11/19/2021, 09/23/2022    Influenza, seasonal, injectable 01/30/2013, 11/29/2013    Pneumococcal Conjugate Vaccine 20-valent (Pcv20), Polysace 05/27/2022    Pneumococcal Polysaccharide PPV23 01/18/2019    Tdap 09/18/2020       Danay Hernández PA-C  5/24/2024 5:19  [facility]

## 2024-05-24 NOTE — ASSESSMENT & PLAN NOTE
Discussed treatment options. Patient will consider Lamisil after doing labs to evaluate current baseline LFTs.

## 2024-05-31 DIAGNOSIS — E11.65 POORLY CONTROLLED DIABETES MELLITUS (HCC): ICD-10-CM

## 2024-06-03 ENCOUNTER — TELEPHONE (OUTPATIENT)
Dept: OTHER | Facility: HOSPITAL | Age: 64
End: 2024-06-03

## 2024-06-03 DIAGNOSIS — E11.3293 TYPE 2 DIABETES MELLITUS WITH MILD NONPROLIFERATIVE RETINOPATHY OF BOTH EYES, WITHOUT LONG-TERM CURRENT USE OF INSULIN, MACULAR EDEMA PRESENCE UNSPECIFIED (HCC): ICD-10-CM

## 2024-06-03 NOTE — TELEPHONE ENCOUNTER
Patient called in stating that [facility] is requiring an approval from doctors office before they release his ozempic. He is going away on Thursday and is out.

## 2024-06-04 DIAGNOSIS — E11.3293 TYPE 2 DIABETES MELLITUS WITH MILD NONPROLIFERATIVE RETINOPATHY OF BOTH EYES, WITHOUT LONG-TERM CURRENT USE OF INSULIN, MACULAR EDEMA PRESENCE UNSPECIFIED (HCC): ICD-10-CM

## 2024-06-04 NOTE — TELEPHONE ENCOUNTER
Reason for call:   [] Refill   [x] Prior Auth  [] Other:     Office:   [x] PCP/Provider - Porter Blackmon MD   [] Specialty/Provider -     Medication: semaglutide, 2 mg/dose, (Ozempic) 8 mg/ mL injection pen     Dose/Frequency: Inject 0.75 mL (2 mg total) under the skin once a week For diabetes     Quantity: 9 ml    Pharmacy: [facility]/pharm[facility] #[id number]  [place] - [id number]     Does the patient have enough for 3 days?   [] Yes   [x] No - Send as HP to POD

## 2024-06-05 ENCOUNTER — TELEPHONE (OUTPATIENT)
Age: 64
End: 2024-06-05

## 2024-06-05 NOTE — TELEPHONE ENCOUNTER
Last visit 05/24/2024  Next 09/06/2024    Patient is inquiring about the prior authorization for Ozempic. Patient stated he is leaving out of town tomorrow.  I explained to the patient that it all depends on his medical insurance and that it takes several days.

## 2024-06-05 NOTE — TELEPHONE ENCOUNTER
PA for Ozempic 2mg Approved     Date(s) approved 06/05/2024-06/05/2027    Case #    Patient advised by          [] MyChart Message  [] Phone call   []LMOM  []L/M to call office as no active Communication consent on file  []Unable to leave detailed message as VM not approved on Communication consent       Pharmacy advised by    [x]Fax  []Phone call    Approval letter scanned into Media Yes

## 2024-06-05 NOTE — TELEPHONE ENCOUNTER
PA for Ozempic 2mg     Submitted via    [x]CMM-KEY [id number]  []S[employer]idePal-Case ID #   []Faxed to plan   []Other website   []Phone call Case ID #     Office notes sent, clinical questions answered. Awaiting determination    Turnaround time for your insurance to make a decision on your Prior Authorization can take 7-21 business days.

## 2024-06-12 ENCOUNTER — TELEPHONE (OUTPATIENT)
Dept: PSYCHIATRY | Facility: CLINIC | Age: 64
End: 2024-06-12

## 2024-06-17 ENCOUNTER — OCCMED (OUTPATIENT)
Dept: URGENT CARE | Facility: CLINIC | Age: 64
End: 2024-06-17

## 2024-06-17 DIAGNOSIS — Z02.83 ENCOUNTER FOR DRUG SCREENING: Primary | ICD-10-CM

## 2024-06-17 NOTE — PROGRESS NOTES
This encounter was created for [facility] orders only . This encounter was created for [facility] orders only .

## 2024-06-21 NOTE — TELEPHONE ENCOUNTER
Patient called regarding services. Placed on Epic wait list with preferences below.    Medication Management and Talk Therapy    Hector Guido, Female provider, ROF, and prefers in person    Insurance: [employer] PPO  Type: Commercial  [facility]: [place]    PC: To get along with wife better

## 2024-08-16 ENCOUNTER — CONSULT (OUTPATIENT)
Dept: ENDOCRINOLOGY | Facility: CLINIC | Age: 64
End: 2024-08-16
Payer: COMMERCIAL

## 2024-08-16 VITALS
WEIGHT: 223 LBS | DIASTOLIC BLOOD PRESSURE: 72 MMHG | HEIGHT: 69 IN | OXYGEN SATURATION: 97 % | SYSTOLIC BLOOD PRESSURE: 118 MMHG | HEART RATE: 96 BPM | BODY MASS INDEX: 33.03 KG/M2

## 2024-08-16 DIAGNOSIS — E11.3293 TYPE 2 DIABETES MELLITUS WITH MILD NONPROLIFERATIVE RETINOPATHY OF BOTH EYES, WITHOUT LONG-TERM CURRENT USE OF INSULIN, MACULAR EDEMA PRESENCE UNSPECIFIED (HCC): Primary | ICD-10-CM

## 2024-08-16 DIAGNOSIS — E55.9 VITAMIN D DEFICIENCY: ICD-10-CM

## 2024-08-16 DIAGNOSIS — E66.9 OBESITY (BMI 30-39.9): ICD-10-CM

## 2024-08-16 DIAGNOSIS — Z86.79 HISTORY OF HYPERTENSION: ICD-10-CM

## 2024-08-16 DIAGNOSIS — E78.5 HYPERLIPIDEMIA, UNSPECIFIED HYPERLIPIDEMIA TYPE: ICD-10-CM

## 2024-08-16 PROCEDURE — 99204 OFFICE O/P NEW MOD 45 MIN: CPT | Performed by: INTERNAL MEDICINE

## 2024-08-16 NOTE — PROGRESS NOTES
New Patient Consult Note      No chief complaint on file.     Referring Provider  Jacquie Lawson Jameson   [address]  [place] [address]     History of Present Illness:   Edward Pham is a 64 y.o. male with a history of type 2 diabetes with complications of mild diabetic retinopathy who presents today to establish care for his diabetes.  See below for a specific discussion regarding his disease course and applicable treatments.    Patient was first diagnosed with T2DM:  early 1990s  Control since diagnosis: Uncontrolled  Medications tried thus far: See below  Current regimen: Ozempic 2 mg weekly, metformin 1000 mg twice daily, Jardiance 25 mg daily, glipizide 2.5 mg daily; very briefly on insulin in the past  BG control: does not check at home  Hypoglycemic episodes: none known nor any concerning symptomatology  Hyperglycemia symptoms: No symptoms other than polyuria, attributed to Jardiance usage  Diet: minimal fast food, snacking while on the road, juice in the past, sandwichs while on the road with formal dinners at home; solids are difficult to eat due to poor dentition  Activity: frequent cycling, some limited activity throughout the day    Opthamology: Last IRIS from October 2023 showed no disease in the left eye but ungradeable right eye image  Hx of hyperlipidemia: yes, not on statin therapy  Hx of hypertension: yes, not on pharmacotherapy  Last Lipid: September 2023, cholesterol 146, triglycerides 106, HDL 46, LDL 80  Last urine microalbumin: May 2024, less than 13  Last hbA1C: May 2024, 7.9    Social Hx: , planning to work for the next 2-3 years  Family HX: grandfather with diabetes    Patient Active Problem List   Diagnosis    Asthma    Basal cell carcinoma of skin    Environmental and seasonal allergies    Hyperlipidemia    History of hypertension    Obesity (BMI 30-39.9)    Onychomycosis of toenail    Pannus, abdominal    Type 2 diabetes mellitus with mild  nonproliferative retinopathy of both eyes, without long-term current use of insulin (HCC)    Sciatica of left side    Sleep apnea    Anemia    Decreased pulse    Vitamin D deficiency    Right wrist tendonitis      Past Medical History:   Diagnosis Date    Diabetes mellitus (HCC)     Hip pain       Past Surgical History:   Procedure Laterality Date    EAR SURGERY Right 2014    Basal cancer removal    GASTRIC BYPASS        Family History   Problem Relation Age of Onset    Diabetes Family     Hypertension Family     Heart attack Family      Social History     Tobacco Use    Smoking status: Former     Current packs/day: 0.00     Types: Cigarettes     Quit date: 1977     Years since quittin.6    Smokeless tobacco: Never   Substance Use Topics    Alcohol use: Yes     Comment: Social      No Known Allergies      Current Outpatient Medications:     Calcium Ascorbate (VITAMIN C) 500 mg tablet, Take 500 mg by mouth daily, Disp: , Rfl:     Empagliflozin (Jardiance) 25 MG TABS, TAKE 1 TABLET BY MOUTH EVERY DAY IN THE MORNING, Disp: 30 tablet, Rfl: 5    fluticasone (FLONASE) 50 mcg/act nasal spray, SPRAY 2 SPRAYS INTO EACH NOSTRIL DAILY AS NEEDED FOR ALLERGIES., Disp: 48 mL, Rfl: 2    glipiZIDE (GLUCOTROL XL) 2.5 mg 24 hr tablet, Take 1 tablet (2.5 mg total) by mouth daily, Disp: 100 tablet, Rfl: 1    metFORMIN (GLUCOPHAGE) 1000 MG tablet, TAKE 1 TABLET BY MOUTH TWICE A DAY, Disp: 180 tablet, Rfl: 1    Multiple Vitamin (MULTI-DAY) TABS, Take by mouth daily, Disp: , Rfl:     semaglutide, 2 mg/dose, (Ozempic) 8 mg/ mL injection pen, Inject 0.75 mL (2 mg total) under the skin once a week For diabetes, Disp: 9 mL, Rfl: 1    albuterol (ProAir HFA) 90 mcg/act inhaler, Inhale 2 puffs every 6 (six) hours as needed for wheezing or shortness of breath (Patient not taking: Reported on 1/10/2024), Disp: 8.5 g, Rfl: 0    Diclofenac Sodium (VOLTAREN) 1 %, Apply 2 g topically 4 (four) times a day (Patient not taking: Reported  "on 6/30/2023), Disp: 50 g, Rfl: 0    Review of Systems   Constitutional:  Positive for fatigue. Negative for chills and fever.   HENT:  Negative for voice change.    Eyes:  Negative for visual disturbance.   Respiratory:  Negative for shortness of breath.    Cardiovascular:  Negative for chest pain.   Gastrointestinal:  Negative for abdominal pain.   Endocrine: Positive for polyuria (Attributed to Jardiance usage). Negative for cold intolerance, heat intolerance, polydipsia and polyphagia.   Genitourinary:  Negative for dysuria.   Musculoskeletal:  Negative for arthralgias and myalgias.   Skin:  Negative for rash.   Neurological:  Negative for dizziness, weakness and light-headedness.   Psychiatric/Behavioral:  Negative for dysphoric mood.        Physical Exam:  Body mass index is 32.93 kg/m².  /72   Pulse 96   Ht 5' 9\" (1.753 m)   Wt 101 kg (223 lb)   SpO2 97%   BMI 32.93 kg/m²    Wt Readings from Last 3 Encounters:   08/16/24 101 kg (223 lb)   05/24/24 103 kg (226 lb 9.6 oz)   03/12/24 99.8 kg (220 lb)       Physical Exam  Vitals reviewed.   Constitutional:       General: He is not in acute distress.     Appearance: Normal appearance. He is obese. He is not ill-appearing.   HENT:      Head: Normocephalic and atraumatic.      Right Ear: External ear normal.      Left Ear: External ear normal.      Nose: Nose normal.      Mouth/Throat:      Mouth: Mucous membranes are moist.      Pharynx: Oropharynx is clear.      Comments: Missing teeth  Eyes:      Extraocular Movements: Extraocular movements intact.      Conjunctiva/sclera: Conjunctivae normal.      Pupils: Pupils are equal, round, and reactive to light.      Comments: Wears glasses   Cardiovascular:      Rate and Rhythm: Normal rate and regular rhythm.      Heart sounds: Normal heart sounds. No murmur heard.  Pulmonary:      Effort: Pulmonary effort is normal. No respiratory distress.      Breath sounds: Normal breath sounds.   Abdominal:      General: " "Abdomen is flat. Bowel sounds are normal. There is no distension.      Palpations: Abdomen is soft.      Tenderness: There is no abdominal tenderness.   Musculoskeletal:      Right lower leg: No edema.      Left lower leg: No edema.   Skin:     General: Skin is warm and dry.   Neurological:      Mental Status: He is alert. Mental status is at baseline.   Psychiatric:         Mood and Affect: Mood normal.         Behavior: Behavior normal.           Labs:   No components found for: \"HA1C\"  No components found for: \"GLU\"    Lab Results   Component Value Date    CREATININE 0.87 09/13/2023    CREATININE 0.82 09/24/2022    CREATININE 0.79 02/13/2021    BUN 13 09/13/2023     11/18/2017    K 4.3 09/13/2023     09/13/2023    CO2 27 09/13/2023     eGFR   Date Value Ref Range Status   09/13/2023 96 > OR = 60 mL/min/1.73m2 Final     No components found for: \"MALBCRER\"    Lab Results   Component Value Date    CHOL 133 11/18/2017    HDL 46 09/13/2023    TRIG 106 09/13/2023       Lab Results   Component Value Date    ALT 9 09/13/2023    AST 11 09/13/2023    ALKPHOS 62 09/13/2023    BILITOT 0.7 11/18/2017       No results found for: \"TSH\", \"FREET4\", \"TSI\"    Impression:  1. Type 2 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, without long-term current use of insulin, macular edema presence unspecified (HCC)    2. History of hypertension    3. Hyperlipidemia, unspecified hyperlipidemia type    4. Obesity (BMI 30-39.9)    5. Vitamin D deficiency           Plan:    Diagnoses and all orders for this visit:    Type 2 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, without long-term current use of insulin, macular edema presence unspecified (HCC)  -     Lipid panel; Future  -     Ambulatory referral to Diabetic Education; Future  -     Ambulatory referral to Endocrinology  -     Hemoglobin A1C; Future  -     Lipid panel  -     Ambulatory referral to Diabetic Education; " Future  -     Continous glucose monitoring dexcom placement; Future  -     Continous glucose monitoring dexcom intrepretation; Future    History of hypertension    Hyperlipidemia, unspecified hyperlipidemia type  -     Lipid panel; Future  -     Lipid panel    Obesity (BMI 30-39.9)  -     Lipid panel; Future  -     Ambulatory referral to Diabetic Education; Future  -     Lipid panel    Vitamin D deficiency  -     Vitamin D 25 hydroxy; Future  -     Vitamin D 25 hydroxy      T2DM: Patient presents today to establish care for his diabetes.  As noted previously, A1c remains above goal despite multiple antiglycemic agents.  We did discuss the benefits of glucose monitoring to better guide therapy, be this in the form of fingersticks or CGM monitoring.  We did also discuss the possibility of increasing his dose of glipizide as he is maxed out on his other agents, though this would come with an increased risk of hypoglycemia.  This would be an instance in which CGM monitoring would be very beneficial, and we have recommended he proceed with professional Dexcom placement as a test run and to provide us with additional data regarding his glycemic trends.  Pending these results, we could also consider switching from Ozempic to Mounjaro to try and provide additional benefit though we will defer any major changes until we have the results of his Dexcom monitoring.  In regards to screening, he is up-to-date on urine protein screening.  He will be due for annual eye screening in the next several months and was advised to have this scheduled.  He is up-to-date on foot exam.  We did also discuss referral to medical nutrition to further guide lifestyle changes, to which he was agreeable.  He will follow-up in 4 months with routine labs to be completed before that time.    HLD: Patient with history of hyperlipidemia, with most recent lipid panel from September 2023 showing LDL above goal (80).  Patient is not currently on any  pharmacotherapy.  We did discuss the possibility of starting statin therapy, though he would prefer to work on lifestyle changes before pursuing this further.  Will plan for repeat lipid testing prior to next appointment.    HTN: Patient with history of hypertension, with BP in office noted to be well-controlled 118/72.  He will continue off of pharmacotherapy for the time being and will continue to make the lifestyle changes discussed above.    Vitamin D deficiency: Patient with reported history of vitamin D deficiency, with last level from September 2023 at goal (33).  Patient will continue to supplement daily multivitamin.  Repeat vitamin D testing has been ordered.    Discussed with the patient and all questioned fully answered. He will call me if any problems arise.    Counseled patient on diagnostic results, prognosis, risk and benefit of treatment options, instruction for management, importance of treatment compliance, Risk  factor reduction and impressions      Tomasz Bronson MD

## 2024-09-10 ENCOUNTER — TELEPHONE (OUTPATIENT)
Age: 64
End: 2024-09-10

## 2024-09-13 NOTE — TELEPHONE ENCOUNTER
Pt. Is already on [facility] for Talk Therapy and Medication Management as of JUNE, with a Routine Referral on file... There is currently NO AVAILABILITY for Talk Therapy at this time. Pt. Will need to remain on Wait List until an appt becomes available. A Resource packet will be sent out to address on file.

## 2024-09-13 NOTE — TELEPHONE ENCOUNTER
Patient called in regard to someone getting back to him for possible couples counseling appt. Writer reached out to intake dept to check any availability. No availability at time of call.   Patient would like to go through [facility] if scheduling is possible, per prior note. Writer will forward request.     Writer suggested an outside source for couples counseling and provided phone number.

## 2024-09-27 ENCOUNTER — TELEPHONE (OUTPATIENT)
Age: 64
End: 2024-09-27

## 2024-10-02 NOTE — ASSESSMENT & PLAN NOTE
Controlled. Continue albuterol rarely as needed. fh_vein_disease_yes Which Leg Is Worse?: Left Is This A New Presentation, Or A Follow-Up?: Vein Evaluation Additional History: She was referred by Daysi Bajwa for a biopsy proven stasis ulcer to her left medial lower leg. She states the ulcer appeared over a scar where she had a previous skin cancer. The ulcer has been present for 3 weeks. She reports she has never had an ulcer on either leg this is her first. She is currently applying Vaseline and states it is taking a long time to heal. She states the ulcer is not sore. \\n\\n\\nShe diagnosed herself with venous insufficiency because she has spider veins to her left medial ankle that she has had for years. She states her PCP agreed with her diagnosis. Esther wears compression stockings when she works which is 4 days a week & her pcp agreed for her to wear them. \\n\\nNo hx of migraines \\nNo hx of asthma \\nNo hx of a blood clot or stroke \\nNo hx of a PFO.\\nAllergic to Sulfa. \\nNot currently taking iron or doxycycline. \\nShe wears compression stockings 4 days a week while she works. \\nNo previous vein treatment. Family History Of Vein Disease (Include Family Member And Type Of Vein Disease):: Aunts on her father’s side.

## 2024-10-03 NOTE — TELEPHONE ENCOUNTER
I called kaitlin about mnt referral. He said he is only interested in the dexcom pro.     Unable to establish connection, Please call patient. Thank you

## 2024-10-11 ENCOUNTER — TELEPHONE (OUTPATIENT)
Age: 64
End: 2024-10-11

## 2024-10-11 NOTE — TELEPHONE ENCOUNTER
Patient called in to see if we have any appointments earlier on 10/18 for his placement. If not, then he needs to reschedule both of his appointments.    I was unable to reach a team member at this time. Please call patient back.

## 2024-10-14 NOTE — TELEPHONE ENCOUNTER
Phone call from patient returning call will take 0730 appt on 10/18/24. Message sent to Zuleika in [place].

## 2024-10-14 NOTE — TELEPHONE ENCOUNTER
L/M for patient and offered 7:30 or 8:45 on the same day.  Waiting on a call back to confirm which time.  If patient calls back, please confirm which time and send back to Zuleika so she can change the appointment. Thank you.

## 2024-10-17 ENCOUNTER — TELEPHONE (OUTPATIENT)
Age: 64
End: 2024-10-17

## 2024-10-17 NOTE — TELEPHONE ENCOUNTER
Pt called to cancel appt for tomorrow.   He needs to reschedule  unable to reach a team member at this time, please call

## 2024-10-28 ENCOUNTER — OFFICE VISIT (OUTPATIENT)
Dept: DIABETES SERVICES | Facility: CLINIC | Age: 64
End: 2024-10-28
Payer: COMMERCIAL

## 2024-10-28 DIAGNOSIS — E11.3293 TYPE 2 DIABETES MELLITUS WITH MILD NONPROLIFERATIVE RETINOPATHY OF BOTH EYES, WITHOUT LONG-TERM CURRENT USE OF INSULIN, MACULAR EDEMA PRESENCE UNSPECIFIED (HCC): Primary | ICD-10-CM

## 2024-10-28 PROCEDURE — 95250 CONT GLUC MNTR PHYS/QHP EQP: CPT

## 2024-10-28 NOTE — PROGRESS NOTES
Dexcom Professional Training    Met with Edward Pham for [employer] Professional training. Training today included:    - Explained procedure to Edward  - Patient agreement signed  - Checked sensor expiration date; Sensor lot number: [id number], Transmitter Id: [id number]  - Patient has a blood glucose meter and strips  -  has been charged and unblinded  - Transmitter has been cleaned with alcohol and dried  - Discussed site selection; identified insertion site: abdomen  - Cleansed the skin with alcohol  - Inserted sensor per instructions: smooth tape on the skin, insert sensor, withdraw needle, remove insertion tool  - Snapped in grey transmitter  - Verified transmitter fully snapped in on both sides (2clicks)  - Removed transmitter latch  - Disposed insertion tool in sharps container  - Started Sensor and verified communication with  (antenna symbol)    Patient instructions reviewed with patient:    - Perform 2 start up finger stick BG's 2 hours after insertion and calibrate  - Calibrate  every 12 hours using BG meter readings  - Fill out log sheets, one for each day  - Sensor is waterproof for showering and swimming, remove for hot tub, MRI  -  is not waterproof  - Remove if redness, bleeding, swelling, discomfort at site  - Removal is in 1 week with return instructions.     Sensor inserted by: Xi Antoine RDN    Edward will return in 10 days for sensor removal and data download.      Start- Stop: 7:52-8:22  Total Minutes: 30 Minutes  Group or Individual Instruction: DSME  Other: DO Xi Ontiveros, NABEEL  [address]  [address]  [facility] [address]

## 2024-11-10 DIAGNOSIS — E11.3293 TYPE 2 DIABETES MELLITUS WITH MILD NONPROLIFERATIVE RETINOPATHY OF BOTH EYES, WITHOUT LONG-TERM CURRENT USE OF INSULIN, MACULAR EDEMA PRESENCE UNSPECIFIED (HCC): ICD-10-CM

## 2024-11-12 RX ORDER — EMPAGLIFLOZIN 25 MG/1
25 TABLET, FILM COATED ORAL EVERY MORNING
Qty: 30 TABLET | Refills: 5 | Status: SHIPPED | OUTPATIENT
Start: 2024-11-12

## 2024-11-13 DIAGNOSIS — E11.3293 TYPE 2 DIABETES MELLITUS WITH MILD NONPROLIFERATIVE RETINOPATHY OF BOTH EYES, WITHOUT LONG-TERM CURRENT USE OF INSULIN, MACULAR EDEMA PRESENCE UNSPECIFIED (HCC): ICD-10-CM

## 2024-11-15 ENCOUNTER — TELEPHONE (OUTPATIENT)
Dept: DIABETES SERVICES | Facility: CLINIC | Age: 64
End: 2024-11-15

## 2024-11-15 ENCOUNTER — OFFICE VISIT (OUTPATIENT)
Dept: DIABETES SERVICES | Facility: CLINIC | Age: 64
End: 2024-11-15

## 2024-11-15 DIAGNOSIS — E11.3293 TYPE 2 DIABETES MELLITUS WITH MILD NONPROLIFERATIVE RETINOPATHY OF BOTH EYES, WITHOUT LONG-TERM CURRENT USE OF INSULIN, MACULAR EDEMA PRESENCE UNSPECIFIED (HCC): Primary | ICD-10-CM

## 2024-11-15 PROCEDURE — PBNCHG PB NO CHARGE PLACEHOLDER

## 2024-11-15 RX ORDER — GLIPIZIDE 2.5 MG/1
2.5 TABLET, EXTENDED RELEASE ORAL DAILY
Qty: 90 TABLET | Refills: 2 | Status: SHIPPED | OUTPATIENT
Start: 2024-11-15

## 2024-11-15 NOTE — PROGRESS NOTES
Dexcom Professional Removal     Download   - Downloaded  via Dexcom software   - Printed and scanned in Dexcom Pro reports   - Submitted to provider for review   - Cleared Data    - Sanitized transmitter,  and case    Note: Patient forgot to bring in his 10 days food log for review.     Sensor Removal By: Xi Antoine RDN  Download Performed by: Xi Antoine RDN

## 2024-11-26 NOTE — PROGRESS NOTES
Established Patient Progress Note      Chief Complaint   Patient presents with    Diabetes Type 2        Impression & Plan:     Assessment & Plan  Type 2 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, without long-term current use of insulin, macular edema presence unspecified (HCC)    Lab Results   Component Value Date    HGBA1C 7.5 (A) 11/27/2024   Current HbA1c represents suboptimal control. Blood sugars demonstrating postprandial elevations likely due to dietary indiscretion/eating fast food while driving.   Continue current regimen with the following adjustments:  Transition Ozempic to Mounjaro with plan to increase as tolerated  Sample provided in office of 2.5mg dose, with prescription for 5mg dose sent to begin in 4 weeks.  Advised to adhere to diabetic diet, and recommended staying active/exercising routinely.  Discussed symptoms and treatment of hypoglycemia.    Recommended routine follow-up with Ophthalmology and foot exams.   Ordered blood work to complete prior to next visit.    Orders:    Tirzepatide (Mounjaro) 5 MG/0.5ML SOAJ; Inject 5 mg under the skin once a week Do not start before December 25, 2024.    Comprehensive metabolic panel; Future    Hemoglobin A1C; Future    POCT hemoglobin A1c    Vitamin D deficiency  Continue vitamin D supplement       Hyperlipidemia, unspecified hyperlipidemia type  Not currently on statin - pursuing lifestyle changes  Check routine lipid panel.        History of hypertension  BP at goal.   Continue current medication regimen.             History of Present Illness:   Edward Pham is a 64 y.o. male with type 2 diabetes, history of bariatric surgery, seen in follow up. Reports complications of microvascular disease, including retinopathy. Denies recent severe hypoglycemic or severe hyperglycemic episodes. Denies any issues with his current regimen. Last A1C was 7.5 in office today. Patient does not check sugars regularly. 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 He questions juicing diet and we reviewed the high sugar and low protein content of this diet not being ideal. Reviewed improving diet and transitioning to Mounjaro- patient agreeable     CGM Interpretation:  Edward LUCAS Vero   Date Range: 10/28/24 - 24  Device used: Dexcom  Professional Use- Physician Provided Equipment  Indication: Type 2 Diabetes  Analysis of data:   Average Glucose: 202  GMI: 8.1  SD : 68   Time in Target Range: 48%   Time Above Range: 29% high, 23% very high    Time Below Range: <1% very low   Interpretation of data: postprandial high  More than 72 hours of data was reviewed. Report to be scanned to chart.     Current regimen:   Ozempic 2mg weekly  Glipizide 2.5mg daily  Jardiance 25mg daily  Metformin 1000mg BID    Last Eye Exam: 10/6/23, overdue- patient plans to schedule  Last Foot Exam: 24 , due: 2025    Patient Active Problem List   Diagnosis    Asthma    Basal cell carcinoma of skin    Environmental and seasonal allergies    Hyperlipidemia    History of hypertension    Obesity (BMI 30-39.9)    Onychomycosis of toenail    Pannus, abdominal    Type 2 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, without long-term current use of insulin (HCC)    Sciatica of left side    Sleep apnea    Anemia    Decreased pulse    Vitamin D deficiency    Right wrist tendonitis      Past Medical History:   Diagnosis Date    Diabetes mellitus (HCC)     Hip pain       Past Surgical History:   Procedure Laterality Date    EAR SURGERY Right 2014    Basal cancer removal    GASTRIC BYPASS        Social History     Tobacco Use    Smoking status: Former     Current packs/day: 0.00     Types: Cigarettes     Quit date: 1977     Years since quittin.9    Smokeless tobacco: Never   Substance Use Topics    Alcohol use: Yes     Comment: Social      No Known Allergies      Current  "Outpatient Medications:     Calcium Ascorbate (VITAMIN C) 500 mg tablet, Take 500 mg by mouth daily, Disp: , Rfl:     fluticasone (FLONASE) 50 mcg/act nasal spray, SPRAY 2 SPRAYS INTO EACH NOSTRIL DAILY AS NEEDED FOR ALLERGIES., Disp: 48 mL, Rfl: 2    glipiZIDE (GLUCOTROL XL) 2.5 mg 24 hr tablet, TAKE 1 TABLET BY MOUTH EVERY DAY, Disp: 90 tablet, Rfl: 2    Jardiance 25 MG TABS, TAKE 1 TABLET BY MOUTH EVERY DAY IN THE MORNING, Disp: 30 tablet, Rfl: 5    metFORMIN (GLUCOPHAGE) 1000 MG tablet, TAKE 1 TABLET BY MOUTH TWICE A DAY, Disp: 180 tablet, Rfl: 1    Multiple Vitamin (MULTI-DAY) TABS, Take by mouth daily, Disp: , Rfl:     [START ON 12/25/2024] Tirzepatide (Mounjaro) 5 MG/0.5ML SOAJ, Inject 5 mg under the skin once a week Do not start before December 25, 2024., Disp: 2 mL, Rfl: 1    albuterol (ProAir HFA) 90 mcg/act inhaler, Inhale 2 puffs every 6 (six) hours as needed for wheezing or shortness of breath (Patient not taking: Reported on 1/10/2024), Disp: 8.5 g, Rfl: 0    Diclofenac Sodium (VOLTAREN) 1 %, Apply 2 g topically 4 (four) times a day (Patient not taking: Reported on 6/30/2023), Disp: 50 g, Rfl: 0    Review of Systems   Constitutional:  Negative for fatigue.   Gastrointestinal:  Negative for abdominal pain, constipation, diarrhea and nausea.       Physical Exam:  Body mass index is 31.01 kg/m².  /80   Pulse 65   Ht 5' 9\" (1.753 m)   Wt 95.3 kg (210 lb)   SpO2 98%   BMI 31.01 kg/m²    Wt Readings from Last 3 Encounters:   11/27/24 95.3 kg (210 lb)   08/16/24 101 kg (223 lb)   05/24/24 103 kg (226 lb 9.6 oz)       Physical Exam  Constitutional:       General: He is not in acute distress.     Appearance: He is well-developed.   HENT:      Head: Normocephalic and atraumatic.   Eyes:      General: No scleral icterus.     Conjunctiva/sclera: Conjunctivae normal.   Pulmonary:      Effort: Pulmonary effort is normal. No respiratory distress.   Skin:     Findings: No rash.   Neurological:      Mental " "Status: He is alert.   Psychiatric:         Mood and Affect: Mood and affect normal.           Labs:   Lab Results   Component Value Date    HGBA1C 7.5 (A) 11/27/2024    HGBA1C 7.9 (A) 05/24/2024    HGBA1C 7.5 (H) 09/13/2023     Lab Results   Component Value Date    CREATININE 0.87 09/13/2023    CREATININE 0.82 09/24/2022    CREATININE 0.79 02/13/2021    BUN 13 09/13/2023     11/18/2017    K 4.3 09/13/2023     09/13/2023    CO2 27 09/13/2023     eGFR   Date Value Ref Range Status   09/13/2023 96 > OR = 60 mL/min/1.73m2 Final     Lab Results   Component Value Date    CHOL 133 11/18/2017    HDL 46 09/13/2023    TRIG 106 09/13/2023     Lab Results   Component Value Date    ALT 9 09/13/2023    AST 11 09/13/2023    ALKPHOS 62 09/13/2023    BILITOT 0.7 11/18/2017     No results found for: \"[id number]\"    Patient Instructions   Continue your medications with the following changes:  Stop taking Ozempic, and begin Mounjaro at starter dose (2.5mg). If you tolerate this, please titrate up to the next dose (5mg) in one month.   Monitor blood sugars regularly  Contact the office with any severe hypoglycemic or hyperglycemic events  Maintain appropriate diet and physical activity  Follow up in 3 months  Call with any questions, concerns, or refill requests  Obtain labs prior to your next appointment     Discussed with the patient and all questioned fully answered. He will call me if any problems arise.    Gloria Modi PA-C        "

## 2024-11-26 NOTE — ASSESSMENT & PLAN NOTE
Lab Results   Component Value Date    HGBA1C 7.5 (A) 11/27/2024   Current HbA1c represents suboptimal control. Blood sugars demonstrating postprandial elevations likely due to dietary indiscretion/eating fast food while driving.   Continue current regimen with the following adjustments:  Transition Ozempic to Mounjaro with plan to increase as tolerated  Sample provided in office of 2.5mg dose, with prescription for 5mg dose sent to begin in 4 weeks.  Advised to adhere to diabetic diet, and recommended staying active/exercising routinely.  Discussed symptoms and treatment of hypoglycemia.    Recommended routine follow-up with Ophthalmology and foot exams.   Ordered blood work to complete prior to next visit.    Orders:    Tirzepatide (Mounjaro) 5 MG/0.5ML SOAJ; Inject 5 mg under the skin once a week Do not start before December 25, 2024.    Comprehensive metabolic panel; Future    Hemoglobin A1C; Future    POCT hemoglobin A1c

## 2024-11-27 ENCOUNTER — TELEPHONE (OUTPATIENT)
Age: 64
End: 2024-11-27

## 2024-11-27 ENCOUNTER — OFFICE VISIT (OUTPATIENT)
Dept: ENDOCRINOLOGY | Facility: CLINIC | Age: 64
End: 2024-11-27
Payer: COMMERCIAL

## 2024-11-27 VITALS
HEART RATE: 65 BPM | BODY MASS INDEX: 31.1 KG/M2 | HEIGHT: 69 IN | DIASTOLIC BLOOD PRESSURE: 80 MMHG | OXYGEN SATURATION: 98 % | SYSTOLIC BLOOD PRESSURE: 120 MMHG | WEIGHT: 210 LBS

## 2024-11-27 DIAGNOSIS — Z86.79 HISTORY OF HYPERTENSION: ICD-10-CM

## 2024-11-27 DIAGNOSIS — E11.65 POORLY CONTROLLED DIABETES MELLITUS (HCC): ICD-10-CM

## 2024-11-27 DIAGNOSIS — E11.3293 TYPE 2 DIABETES MELLITUS WITH MILD NONPROLIFERATIVE RETINOPATHY OF BOTH EYES, WITHOUT LONG-TERM CURRENT USE OF INSULIN, MACULAR EDEMA PRESENCE UNSPECIFIED (HCC): Primary | ICD-10-CM

## 2024-11-27 DIAGNOSIS — E55.9 VITAMIN D DEFICIENCY: ICD-10-CM

## 2024-11-27 DIAGNOSIS — E78.5 HYPERLIPIDEMIA, UNSPECIFIED HYPERLIPIDEMIA TYPE: ICD-10-CM

## 2024-11-27 LAB — SL AMB POCT HEMOGLOBIN AIC: 7.5 (ref ?–6.5)

## 2024-11-27 PROCEDURE — 99214 OFFICE O/P EST MOD 30 MIN: CPT | Performed by: PHYSICIAN ASSISTANT

## 2024-11-27 PROCEDURE — 83036 HEMOGLOBIN GLYCOSYLATED A1C: CPT | Performed by: PHYSICIAN ASSISTANT

## 2024-11-27 PROCEDURE — 95250 CONT GLUC MNTR PHYS/QHP EQP: CPT | Performed by: PHYSICIAN ASSISTANT

## 2024-11-27 RX ORDER — TIRZEPATIDE 5 MG/.5ML
5 INJECTION, SOLUTION SUBCUTANEOUS WEEKLY
Qty: 2 ML | Refills: 1 | Status: SHIPPED | OUTPATIENT
Start: 2024-12-25

## 2024-11-27 RX ORDER — TIRZEPATIDE 2.5 MG/.5ML
2.5 INJECTION, SOLUTION SUBCUTANEOUS WEEKLY
Qty: 2 ML | Refills: 0 | Status: CANCELLED | OUTPATIENT
Start: 2024-11-27

## 2024-11-27 NOTE — PATIENT INSTRUCTIONS
Continue your medications with the following changes:  Stop taking Ozempic, and begin Mounjaro at starter dose (2.5mg). If you tolerate this, please titrate up to the next dose (5mg) in one month.   Monitor blood sugars regularly  Contact the office with any severe hypoglycemic or hyperglycemic events  Maintain appropriate diet and physical activity  Follow up in 3 months  Call with any questions, concerns, or refill requests  Obtain labs prior to your next appointment

## 2024-11-27 NOTE — TELEPHONE ENCOUNTER
Pt called asking to schedule his quarterly check up with Danay Hernández for this Friday (11/29).  I explained she does not have any openings Friday, or even in December.  He asked for a return call for possible accomodation.  He reports he did see his Endocrinologist today.   pt may need psych admission/No psychiatric contraindications to discharge

## 2024-11-29 ENCOUNTER — TELEPHONE (OUTPATIENT)
Age: 64
End: 2024-11-29

## 2024-11-29 NOTE — TELEPHONE ENCOUNTER
Pt called to schedule his f/u appt. Per message.  First available appt 1/29.  Pt stated he did see his endocrinologist.    Pt was requesting a script for viagra.  Stated used in past.  Nothing in med order to refill.  Please let Pt know if script can be sent or if he needs to wait until his 1/29 appt.    Pt requested his labs be sent to [email or web address] faxed to [phone] - he is going in morning.

## 2024-12-01 LAB
ALBUMIN SERPL-MCNC: 4.1 G/DL (ref 3.6–5.1)
ALBUMIN/GLOB SERPL: 1.6 (CALC) (ref 1–2.5)
ALP SERPL-CCNC: 68 U/L (ref 35–144)
ALT SERPL-CCNC: 9 U/L (ref 9–46)
AST SERPL-CCNC: 10 U/L (ref 10–35)
BILIRUB SERPL-MCNC: 0.6 MG/DL (ref 0.2–1.2)
BUN SERPL-MCNC: 16 MG/DL (ref 7–25)
BUN/CREAT SERPL: ABNORMAL (CALC) (ref 6–22)
CALCIUM SERPL-MCNC: 9.3 MG/DL (ref 8.6–10.3)
CHLORIDE SERPL-SCNC: 103 MMOL/L (ref 98–110)
CO2 SERPL-SCNC: 29 MMOL/L (ref 20–32)
CREAT SERPL-MCNC: 0.88 MG/DL (ref 0.7–1.35)
GFR/BSA.PRED SERPLBLD CYS-BASED-ARV: 95 ML/MIN/1.73M2
GLOBULIN SER CALC-MCNC: 2.5 G/DL (CALC) (ref 1.9–3.7)
GLUCOSE SERPL-MCNC: 149 MG/DL (ref 65–99)
POTASSIUM SERPL-SCNC: 4.4 MMOL/L (ref 3.5–5.3)
PROT SERPL-MCNC: 6.6 G/DL (ref 6.1–8.1)
SODIUM SERPL-SCNC: 141 MMOL/L (ref 135–146)

## 2024-12-02 NOTE — TELEPHONE ENCOUNTER
12/2 8:34am: LVM for PT to call back. As of right now there is a 2pm spot available for today w/PCP, 12/2/24.

## 2024-12-09 ENCOUNTER — OFFICE VISIT (OUTPATIENT)
Dept: FAMILY MEDICINE CLINIC | Facility: CLINIC | Age: 64
End: 2024-12-09
Payer: COMMERCIAL

## 2024-12-09 VITALS
SYSTOLIC BLOOD PRESSURE: 140 MMHG | OXYGEN SATURATION: 98 % | BODY MASS INDEX: 31.87 KG/M2 | WEIGHT: 215.2 LBS | HEIGHT: 69 IN | TEMPERATURE: 97 F | DIASTOLIC BLOOD PRESSURE: 86 MMHG | HEART RATE: 83 BPM

## 2024-12-09 DIAGNOSIS — E11.3293 TYPE 2 DIABETES MELLITUS WITH MILD NONPROLIFERATIVE RETINOPATHY OF BOTH EYES, WITHOUT LONG-TERM CURRENT USE OF INSULIN, MACULAR EDEMA PRESENCE UNSPECIFIED (HCC): Primary | ICD-10-CM

## 2024-12-09 DIAGNOSIS — J45.20 MILD INTERMITTENT ASTHMA WITHOUT COMPLICATION: ICD-10-CM

## 2024-12-09 DIAGNOSIS — N52.9 ERECTILE DYSFUNCTION, UNSPECIFIED ERECTILE DYSFUNCTION TYPE: ICD-10-CM

## 2024-12-09 DIAGNOSIS — G47.30 SLEEP APNEA, UNSPECIFIED TYPE: ICD-10-CM

## 2024-12-09 DIAGNOSIS — Z23 ENCOUNTER FOR IMMUNIZATION: ICD-10-CM

## 2024-12-09 DIAGNOSIS — E66.9 OBESITY (BMI 30-39.9): ICD-10-CM

## 2024-12-09 DIAGNOSIS — E78.5 HYPERLIPIDEMIA, UNSPECIFIED HYPERLIPIDEMIA TYPE: ICD-10-CM

## 2024-12-09 DIAGNOSIS — E55.9 VITAMIN D DEFICIENCY: ICD-10-CM

## 2024-12-09 LAB
LEFT EYE DIABETIC RETINOPATHY: NORMAL
LEFT EYE IMAGE QUALITY: NORMAL
LEFT EYE MACULAR EDEMA: NORMAL
LEFT EYE OTHER RETINOPATHY: NORMAL
RIGHT EYE DIABETIC RETINOPATHY: NORMAL
RIGHT EYE IMAGE QUALITY: NORMAL
RIGHT EYE MACULAR EDEMA: NORMAL
RIGHT EYE OTHER RETINOPATHY: NORMAL
SEVERITY (EYE EXAM): NORMAL

## 2024-12-09 PROCEDURE — 99214 OFFICE O/P EST MOD 30 MIN: CPT | Performed by: PHYSICIAN ASSISTANT

## 2024-12-09 PROCEDURE — 90471 IMMUNIZATION ADMIN: CPT

## 2024-12-09 PROCEDURE — 90662 IIV NO PRSV INCREASED AG IM: CPT

## 2024-12-09 RX ORDER — SILDENAFIL 50 MG/1
25-50 TABLET, FILM COATED ORAL DAILY PRN
Qty: 10 TABLET | Refills: 2 | Status: SHIPPED | OUTPATIENT
Start: 2024-12-09

## 2024-12-09 NOTE — ASSESSMENT & PLAN NOTE
Uncontrolled but was recently switched from Ozempic to Mounjaro 2 weeks ago. Patient has not been checking glucose at home. He was encouraged to call with name of glucometer so that supplies can be sent in. Encouraged to monitor glucose (concern for temporary increase with change from Ozempic 2 mg down to Monjauro 2.5 mg). Patient will be increasing Monjauro to 5 mg in 2 weeks. Follow-up in 3 months. Call with concerns in the interim. Continue for now with metformin 1000 mg twice daily, Jardiance 25 mg daily and glipizide XL 2.5 mg daily. Patient would like to get off Jardiance if possible given increased urination. Discussed this can also be related to uncontrolled glucose levels. Monitor for improvement with titration of Monjauro. As Monjauro increases, there might be room to drop Jardiance or decrease the dose.   Lab Results   Component Value Date    HGBA1C 7.5 (A) 11/27/2024       Orders:    [facility]IS Diabetic eye exam

## 2024-12-09 NOTE — ASSESSMENT & PLAN NOTE
Improving. Look for continued improvement as Monjauro is titrated. Limit carbs and calories. Exercise regularly.

## 2024-12-09 NOTE — PROGRESS NOTES
Name: Edward Pham      : 1960      MRN: [id number]  Encounter Provider: Danay Hernández PA-C  Encounter Date: 2024   Encounter department: [facility]  :  Assessment & Plan  Type 2 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, without long-term current use of insulin, macular edema presence unspecified (HCC)  Uncontrolled but was recently switched from Ozempic to Mounjaro 2 weeks ago. Patient has not been checking glucose at home. He was encouraged to call with name of glucometer so that supplies can be sent in. Encouraged to monitor glucose (concern for temporary increase with change from Ozempic 2 mg down to Monjauro 2.5 mg). Patient will be increasing Monjauro to 5 mg in 2 weeks. Follow-up in 3 months. Call with concerns in the interim. Continue for now with metformin 1000 mg twice daily, Jardiance 25 mg daily and glipizide XL 2.5 mg daily. Patient would like to get off Jardiance if possible given increased urination. Discussed this can also be related to uncontrolled glucose levels. Monitor for improvement with titration of Monjauro. As Monjauro increases, there might be room to drop Jardiance or decrease the dose.   Lab Results   Component Value Date    HGBA1C 7.5 (A) 2024       Orders:    [facility] Diabetic eye exam    Encounter for immunization    Orders:    influenza vaccine, high-dose, PF 0.5 mL (Fluzone High Dose)    Sleep apnea, unspecified type  Continue regular CPAP use.       Hyperlipidemia, unspecified hyperlipidemia type  Not on statin. Check labs as previously ordered.        Obesity (BMI 30-39.9)  Improving. Look for continued improvement as Monjauro is titrated. Limit carbs and calories. Exercise regularly.          Vitamin D deficiency  Recheck with labs as previously ordered.        Mild intermittent asthma without complication  Not requiring albuterol.        Erectile dysfunction, unspecified erectile dysfunction type  Given  "script for Viagra to try. Start with 1/2 tablet and increase to 1 tablet if needed. Go to ER if have an erection lasting over 4 hours.   Orders:    sildenafil (VIAGRA) 50 MG tablet; Take 0.5-1 tablets (25-50 mg total) by mouth daily as needed for erectile dysfunction           History of Present Illness     DM2 - on Mounjaro 2.5 mg weekly, metformin 1000 mg twice daily, glipizide XL 2.5 mg daily, and Jardiance 25 mg daily - A1c last month was 7.5% - was just switched from Ozempic to Mounjaro at recent Endocrinology visit about 2 weeks ago    Sleep apnea - on CPAP nightly - notes that he has been using it consistently since he will be getting driving physical soon     Hyperlipidemia - not on statin - LDL in September was 81    Vitamin D deficiency - on MV daily - level in September was 32    Notes that he is having ED - notes that he wants to try Viagra         Review of Systems   Constitutional:  Negative for chills and fever.   Respiratory:  Negative for shortness of breath.    Cardiovascular:  Negative for chest pain, palpitations and leg swelling.   Neurological:  Negative for dizziness and headaches.     Medical History Reviewed by provider this encounter:     .     Objective   /86   Pulse 83   Temp (!) 97 °F (36.1 °C)   Ht 5' 9\" (1.753 m)   Wt 97.6 kg (215 lb 3.2 oz)   SpO2 98%   BMI 31.78 kg/m²      Physical Exam  Vitals reviewed.   Constitutional:       General: He is not in acute distress.     Appearance: Normal appearance. He is well-developed. He is obese. He is not ill-appearing.   HENT:      Head: Normocephalic and atraumatic.   Neck:      Thyroid: No thyromegaly.      Vascular: No carotid bruit.   Cardiovascular:      Rate and Rhythm: Normal rate and regular rhythm.      Pulses: Normal pulses.      Heart sounds: Normal heart sounds. No murmur heard.  Pulmonary:      Effort: Pulmonary effort is normal.      Breath sounds: Normal breath sounds. No wheezing, rhonchi or rales.   Musculoskeletal: "      Cervical back: Neck supple.      Right lower leg: No edema.      Left lower leg: No edema.   Lymphadenopathy:      Cervical: No cervical adenopathy.   Skin:     General: Skin is warm and dry.   Neurological:      Mental Status: He is alert.   Psychiatric:         Mood and Affect: Mood normal.         Behavior: Behavior normal.         Thought Content: Thought content normal.         Judgment: Judgment normal.

## 2024-12-10 ENCOUNTER — RESULTS FOLLOW-UP (OUTPATIENT)
Dept: FAMILY MEDICINE CLINIC | Facility: CLINIC | Age: 64
End: 2024-12-10

## 2024-12-10 ENCOUNTER — TELEPHONE (OUTPATIENT)
Age: 64
End: 2024-12-10

## 2024-12-10 NOTE — TELEPHONE ENCOUNTER
PA for Sildenafil Citrate 50MG Tablet SUBMITTED to [facility]     via    []CMM-KEY:   [x]Surescripts-Case ID # [id number]   []Availity-Auth ID # NDC #   []Faxed to plan   []Other website   []Phone call Case ID #     [x]PA sent as URGENT    All office notes, labs and other pertaining documents and studies sent. Clinical questions answered. Awaiting determination from insurance company.     Turnaround time for your insurance to make a decision on your Prior Authorization can take 7-21 business days.

## 2024-12-10 NOTE — TELEPHONE ENCOUNTER
PA for Sildenafil Citrate 50MG Tablet APPROVED     Date(s) approved: 12/10/2024-12/10/2027    Case #[id number]     Patient advised by          [x]Ken[place] Message-Communication consent incomplete  []Phone call   []LMOM  []L/M to call office as no active Communication consent on file  []Unable to leave detailed message as VM not approved on Communication consent       Pharmacy advised by    [x]Fax  []Phone call    Approval letter scanned into Media Yes

## 2024-12-30 ENCOUNTER — TELEPHONE (OUTPATIENT)
Age: 64
End: 2024-12-30

## 2024-12-30 NOTE — TELEPHONE ENCOUNTER
Contacted pt off Medication Management wait list in regards to verify needs of service and offer an appt. Pt was unsure if services were still needed and wanted to talk to his wife before scheduling. Provided pt intake dept number to call back.      First Attempt to schedule.

## 2025-01-09 NOTE — TELEPHONE ENCOUNTER
Contacted pt off Medication Management wait list in regards to verify needs of service and offer an appt. Pt stated that after talking to his wife, they are not interested in services. Pt removed from wait list.

## 2025-02-19 DIAGNOSIS — E11.3293 TYPE 2 DIABETES MELLITUS WITH MILD NONPROLIFERATIVE RETINOPATHY OF BOTH EYES, WITHOUT LONG-TERM CURRENT USE OF INSULIN, MACULAR EDEMA PRESENCE UNSPECIFIED (HCC): ICD-10-CM

## 2025-02-20 RX ORDER — TIRZEPATIDE 5 MG/.5ML
5 INJECTION, SOLUTION SUBCUTANEOUS WEEKLY
Qty: 2 ML | Refills: 1 | Status: SHIPPED | OUTPATIENT
Start: 2025-02-20

## 2025-03-02 DIAGNOSIS — E11.65 POORLY CONTROLLED DIABETES MELLITUS (HCC): ICD-10-CM

## 2025-03-21 NOTE — TELEPHONE ENCOUNTER
Copied from CRM #[id number]. Topic: MW Medication/Rx - MW Rx Refill  >> Mar 21, 2025 11:03 AM Jenny ARIZA wrote:  [place] called to request a medication refill and is out of medications or critically low during working hours. Medication is:  Listed on Med Management list. [facility] Clinical to process refill: Selected 'Wrap Up CRM' and created new Refill Med Encounter after clicking 'Convert to Clinical Call'. Selected reason for call 'Refill Request'. Pended medication. Sent Med template and routed as high priority to [facility] CLINICAL MSG POOL. Readback full message.-- DO NOT REPLY / DO NOT REPLY ALL --  -- This inbox is not monitored. If this was sent to the wrong provider or department, reroute message to P ECO Reroute pool. --  -- Message is from Engagement Center Operations ([facility]) --      Message Type:  Refill Medication   Refill request for Pended medication named: see 1  Preferred pharmacy verified, and selected.   [id number] - [place] [address] [facility] [address]    Is the patient OUT of Medication?  Yes and During Work Hours Medication Refills handled by [facility] Clinical - route as high priority to [facility] CLINICAL MSG pool. Patient has been advised it will be addressed within 1 business day.     Message:                    L/m for patient requesting call-back for scheduling (Physical)

## 2025-03-24 ENCOUNTER — RA CDI HCC (OUTPATIENT)
Dept: OTHER | Facility: HOSPITAL | Age: 65
End: 2025-03-24

## 2025-03-24 NOTE — PROGRESS NOTES
HCC coding opportunities          Chart Reviewed number of suggestions sent to Provider: 1    E11.42  see 5/24/24 foot exam with diminished sensory      Patients Insurance        Commercial Insurance: [employer] Commercial Insurance

## 2025-03-28 ENCOUNTER — OFFICE VISIT (OUTPATIENT)
Dept: FAMILY MEDICINE CLINIC | Facility: CLINIC | Age: 65
End: 2025-03-28
Payer: COMMERCIAL

## 2025-03-28 VITALS
HEIGHT: 69 IN | HEART RATE: 97 BPM | SYSTOLIC BLOOD PRESSURE: 122 MMHG | OXYGEN SATURATION: 93 % | DIASTOLIC BLOOD PRESSURE: 80 MMHG | BODY MASS INDEX: 32.88 KG/M2 | WEIGHT: 222 LBS

## 2025-03-28 DIAGNOSIS — E66.9 OBESITY (BMI 30-39.9): ICD-10-CM

## 2025-03-28 DIAGNOSIS — E55.9 VITAMIN D DEFICIENCY: ICD-10-CM

## 2025-03-28 DIAGNOSIS — J45.20 MILD INTERMITTENT ASTHMA WITHOUT COMPLICATION: ICD-10-CM

## 2025-03-28 DIAGNOSIS — E78.5 HYPERLIPIDEMIA, UNSPECIFIED HYPERLIPIDEMIA TYPE: ICD-10-CM

## 2025-03-28 DIAGNOSIS — E11.3293 TYPE 2 DIABETES MELLITUS WITH MILD NONPROLIFERATIVE RETINOPATHY OF BOTH EYES, WITHOUT LONG-TERM CURRENT USE OF INSULIN, MACULAR EDEMA PRESENCE UNSPECIFIED (HCC): Primary | ICD-10-CM

## 2025-03-28 DIAGNOSIS — G47.30 SLEEP APNEA, UNSPECIFIED TYPE: ICD-10-CM

## 2025-03-28 LAB
CREAT UR-MCNC: 30 MG/DL
MICROALBUMIN UR-MCNC: 7 MG/L
MICROALBUMIN/CREAT 24H UR: 23 MG/G CREATININE (ref 0–30)
SL AMB POCT HEMOGLOBIN AIC: 8.2 (ref ?–6.5)

## 2025-03-28 PROCEDURE — 83036 HEMOGLOBIN GLYCOSYLATED A1C: CPT | Performed by: PHYSICIAN ASSISTANT

## 2025-03-28 PROCEDURE — 99214 OFFICE O/P EST MOD 30 MIN: CPT | Performed by: PHYSICIAN ASSISTANT

## 2025-03-28 PROCEDURE — 82043 UR ALBUMIN QUANTITATIVE: CPT | Performed by: PHYSICIAN ASSISTANT

## 2025-03-28 PROCEDURE — 82570 ASSAY OF URINE CREATININE: CPT | Performed by: PHYSICIAN ASSISTANT

## 2025-03-28 RX ORDER — TIRZEPATIDE 7.5 MG/.5ML
7.5 INJECTION, SOLUTION SUBCUTANEOUS WEEKLY
Qty: 2 ML | Refills: 0 | Status: SHIPPED | OUTPATIENT
Start: 2025-03-28

## 2025-03-28 RX ORDER — TIRZEPATIDE 10 MG/.5ML
10 INJECTION, SOLUTION SUBCUTANEOUS WEEKLY
Qty: 6 ML | Refills: 0 | Status: SHIPPED | OUTPATIENT
Start: 2025-03-28

## 2025-03-28 NOTE — PROGRESS NOTES
Diabetic Foot Exam    Patient's shoes and socks removed.    Right Foot/Ankle   Right Foot Inspection  Skin Exam: skin normal, skin intact and dry skin. No warmth, no callus, no erythema, no maceration, no abnormal color, no pre-ulcer, no ulcer and no callus.     Toe Exam: ROM and strength within normal limits.     Sensory   Monofilament testing: diminished    Vascular  Capillary refills: < 3 seconds  The right PT pulse is 2+.     Left Foot/Ankle  Left Foot Inspection  Skin Exam: skin normal, skin intact and dry skin. No warmth, no erythema, no maceration, normal color, no pre-ulcer, no ulcer and no callus.     Toe Exam: ROM and strength within normal limits.     Sensory   Monofilament testing: intact    Vascular  Capillary refills: < 3 seconds  The left DP pulse is 2+. The left PT pulse is 2+.     Assign Risk Category  No deformity present  Loss of protective sensation  No weak pulses  Risk: 1          Name: Edward Pham      : 1960      MRN: [id number]  Encounter Provider: Danay Hernández PA-C  Encounter Date: 3/28/2025   Encounter department: [facility] PRIMARY [facility]    Assessment & Plan  Type 2 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, without long-term current use of insulin, macular edema presence unspecified (HCC)  His A1c level has increased from 7.5 in November to 8.2 currently. He will increase the dosage of Mounjaro to 7.5 mg for one month, then escalate to 10 mg. Prescriptions for these adjustments have been sent to [facility] pharmacy. He is advised to continue his current regimen of glipizide 2.5 mg daily, metformin 1000 mg twice a day, and Jardiance 25 mg daily. He is encouraged to engage in physical activities such as biking to help manage his blood sugar levels.  He inquired about treatment options for neuropathy. Gabapentin was discussed as a potential option for painful symptoms. Improved blood sugar control may also help alleviate some symptoms.  Lab  Results   Component Value Date    HGBA1C 8.2 (A) 03/28/2025       Orders:    POCT hemoglobin A1c    Albumin / creatinine urine ratio    Mounjaro 7.5 MG/0.5ML SOAJ; Inject 7.5 mg under the skin once a week    Mounjaro 10 MG/0.5ML SOAJ; Inject 10 mg under the skin once a week    CBC and differential; Future    Comprehensive metabolic panel; Future    Lipid Panel with Direct LDL reflex; Future    TSH, 3rd generation with Free T4 reflex; Future    Vitamin D 25 hydroxy; Future    Vitamin D deficiency  Not currently supplementing.  Recheck level.  Orders:    Vitamin D 25 hydroxy; Future    Hyperlipidemia, unspecified hyperlipidemia type  He is due for fasting blood work to check his cholesterol levels, as his last test was conducted approximately 1.5 years ago. A lab order has been printed for him to complete this test next Friday.  Orders:    Lipid Panel with Direct LDL reflex; Future    Obesity (BMI 30-39.9)  BMI Counseling: Body mass index is 32.78 kg/m². The BMI is above normal. Nutrition recommendations include moderation in carbohydrate intake. Exercise recommendations include exercising 3-5 times per week.      Orders:    CBC and differential; Future    Comprehensive metabolic panel; Future    Lipid Panel with Direct LDL reflex; Future    TSH, 3rd generation with Free T4 reflex; Future    Vitamin D 25 hydroxy; Future    Mild intermittent asthma without complication  He reports no recent use of albuterol and feels his breathing has been stable. No changes to his current management plan are necessary.       Sleep apnea, unspecified type  He uses his CPAP machine nightly but only manages 4-5 hours of sleep with it. He finds the new machine quieter and more effective. He is advised to contact the company regarding the clogged filter issue.         Assessment & Plan  1. Diabetes Mellitus.  His A1c level has increased from 7.5 in November to 8.2 currently. He will increase the dosage of Mounjaro to 7.5 mg for one month,  then escalate to 10 mg. Prescriptions for these adjustments have been sent to [facility] pharmacy. He is advised to continue his current regimen of glipizide 2.5 mg daily, metformin 1000 mg twice a day, and Jardiance 25 mg daily. He is encouraged to engage in physical activities such as biking to help manage his blood sugar levels.    2.  Asthma.  He reports no recent use of albuterol and feels his breathing has been stable. No changes to his current management plan are necessary.    3. Sleep Apnea.  He uses his CPAP machine nightly but only manages 4-5 hours of sleep with it. He finds the new machine quieter and more effective. He is advised to contact the company regarding the clogged filter issue.    4. Hyperlipidemia.  He is due for fasting blood work to check his cholesterol levels, as his last test was conducted approximately 1.5 years ago. A lab order has been printed for him to complete this test next Friday.    5. Easy Bruising.  He reports increased bruising, likely due to skin thinning with age. He is advised to use lotions such as Cetaphil, CeraVe, Eucerin, or Aveeno, preferably in tube or jar form for better moisturizing.    6. Neuropathy.  He inquired about treatment options for neuropathy. Gabapentin was discussed as a potential option for painful symptoms. Improved blood sugar control may also help alleviate some symptoms.    Follow-up  The patient will follow up in 3 months.       History of Present Illness     History of Present Illness  The patient is a 64-year-old male who presents for follow-up on his chronic medical conditions.    He reports satisfactory control of his blood glucose levels, attributing this to the efficacy of Mounjaro. He is currently on a regimen of Mounjaro 5 mg, glipizide 2.5 mg daily, metformin 1000 mg twice daily, and Jardiance 25 mg daily. He has not been monitoring his blood glucose levels at home. He plans to resume biking as part of his exercise routine. He is considering a  "consultation with an endocrinologist. He expresses interest in consulting a diabetes educator or nutritionist but prefers to attempt self-management first.    He has not required the use of albuterol for his respiratory condition.    He uses his CPAP machine nightly, but only gets 4 or 5 hours of sleep with it. The new machine seems to work better and is quieter. He recently discovered that the filter gets clogged and warns him when it needs to be replaced.    He continues to take a multivitamin supplement and vitamin C 500 mg, which he believes has contributed to a decrease in the frequency of colds.    He has observed an increased tendency to bruise easily, which he attributes to aging. He applies lotion to his feet nightly and plans to extend this practice to his hands and arms.    He is interested in exploring treatment options for neuropathy.    Supplemental Information  He had an A1c test done at [facility], which was 5.9. He was told he has too much bone loss in his upper jaw and can not afford dental implants.    MEDICATIONS  Current: Mounjaro, glipizide, metformin, Jardiance, multivitamin, vitamin C  Discontinued: Voltaren gel, Flonase     Review of Systems   Constitutional:  Negative for chills and fever.   Respiratory:  Negative for shortness of breath.    Cardiovascular:  Negative for chest pain, palpitations and leg swelling.   Neurological:  Negative for dizziness and headaches.     Objective   /80   Pulse 97   Ht 5' 9\" (1.753 m)   Wt 101 kg (222 lb)   SpO2 93%   BMI 32.78 kg/m²     Physical Exam  Lungs were auscultated.  Physical Exam  Vitals reviewed.   Constitutional:       General: He is not in acute distress.     Appearance: Normal appearance. He is well-developed. He is obese. He is not ill-appearing.   HENT:      Head: Normocephalic and atraumatic.   Neck:      Thyroid: No thyromegaly.      Vascular: No carotid bruit.   Cardiovascular:      Rate and Rhythm: Normal rate and regular rhythm. "      Pulses: Normal pulses. no weak pulses.           Dorsalis pedis pulses are 2+ on the left side.        Posterior tibial pulses are 2+ on the right side and 2+ on the left side.      Heart sounds: Normal heart sounds. No murmur heard.  Pulmonary:      Effort: Pulmonary effort is normal.      Breath sounds: Normal breath sounds. No wheezing, rhonchi or rales.   Musculoskeletal:      Cervical back: Neck supple.      Right lower leg: No edema.      Left lower leg: No edema.        Feet:    Feet:      Right foot:      Skin integrity: Dry skin present. No ulcer, skin breakdown, erythema, warmth or callus.      Left foot:      Skin integrity: Dry skin present. No ulcer, skin breakdown, erythema, warmth or callus.   Lymphadenopathy:      Cervical: No cervical adenopathy.   Skin:     General: Skin is warm and dry.   Neurological:      Mental Status: He is alert.   Psychiatric:         Mood and Affect: Mood normal.         Behavior: Behavior normal.         Thought Content: Thought content normal.         Judgment: Judgment normal.

## 2025-03-28 NOTE — ASSESSMENT & PLAN NOTE
His A1c level has increased from 7.5 in November to 8.2 currently. He will increase the dosage of Mounjaro to 7.5 mg for one month, then escalate to 10 mg. Prescriptions for these adjustments have been sent to [facility] pharmacy. He is advised to continue his current regimen of glipizide 2.5 mg daily, metformin 1000 mg twice a day, and Jardiance 25 mg daily. He is encouraged to engage in physical activities such as biking to help manage his blood sugar levels.  He inquired about treatment options for neuropathy. Gabapentin was discussed as a potential option for painful symptoms. Improved blood sugar control may also help alleviate some symptoms.  Lab Results   Component Value Date    HGBA1C 8.2 (A) 03/28/2025       Orders:    POCT hemoglobin A1c    Albumin / creatinine urine ratio    Mounjaro 7.5 MG/0.5ML SOAJ; Inject 7.5 mg under the skin once a week    Mounjaro 10 MG/0.5ML SOAJ; Inject 10 mg under the skin once a week    CBC and differential; Future    Comprehensive metabolic panel; Future    Lipid Panel with Direct LDL reflex; Future    TSH, 3rd generation with Free T4 reflex; Future    Vitamin D 25 hydroxy; Future

## 2025-03-30 ENCOUNTER — RESULTS FOLLOW-UP (OUTPATIENT)
Dept: FAMILY MEDICINE CLINIC | Facility: CLINIC | Age: 65
End: 2025-03-30

## 2025-03-30 NOTE — ASSESSMENT & PLAN NOTE
BMI Counseling: Body mass index is 32.78 kg/m². The BMI is above normal. Nutrition recommendations include moderation in carbohydrate intake. Exercise recommendations include exercising 3-5 times per week.      Orders:    CBC and differential; Future    Comprehensive metabolic panel; Future    Lipid Panel with Direct LDL reflex; Future    TSH, 3rd generation with Free T4 reflex; Future    Vitamin D 25 hydroxy; Future     Impression: Vitreous degeneration, bilateral: H43.813. Plan: Patient notes floaters. Exam demonstrates a PVD without any RD or RT on exam.  The floaters are not debilitating to the patient and do not warrant surgery. Reassurance provided. Precautions discussed with the patient.

## 2025-03-30 NOTE — ASSESSMENT & PLAN NOTE
He is due for fasting blood work to check his cholesterol levels, as his last test was conducted approximately 1.5 years ago. A lab order has been printed for him to complete this test next Friday.  Orders:    Lipid Panel with Direct LDL reflex; Future

## 2025-03-30 NOTE — ASSESSMENT & PLAN NOTE
He uses his CPAP machine nightly but only manages 4-5 hours of sleep with it. He finds the new machine quieter and more effective. He is advised to contact the company regarding the clogged filter issue.

## 2025-03-30 NOTE — ASSESSMENT & PLAN NOTE
He reports no recent use of albuterol and feels his breathing has been stable. No changes to his current management plan are necessary.

## 2025-05-05 ENCOUNTER — OCCMED (OUTPATIENT)
Dept: URGENT CARE | Facility: CLINIC | Age: 65
End: 2025-05-05

## 2025-05-05 DIAGNOSIS — Z02.83 ENCOUNTER FOR DRUG SCREENING: Primary | ICD-10-CM

## 2025-05-10 DIAGNOSIS — E11.3293 TYPE 2 DIABETES MELLITUS WITH MILD NONPROLIFERATIVE RETINOPATHY OF BOTH EYES, WITHOUT LONG-TERM CURRENT USE OF INSULIN, MACULAR EDEMA PRESENCE UNSPECIFIED (HCC): ICD-10-CM

## 2025-05-11 RX ORDER — EMPAGLIFLOZIN 25 MG/1
25 TABLET, FILM COATED ORAL EVERY MORNING
Qty: 30 TABLET | Refills: 5 | Status: SHIPPED | OUTPATIENT
Start: 2025-05-11

## 2025-05-28 DIAGNOSIS — E11.3293 TYPE 2 DIABETES MELLITUS WITH MILD NONPROLIFERATIVE RETINOPATHY OF BOTH EYES, WITHOUT LONG-TERM CURRENT USE OF INSULIN, MACULAR EDEMA PRESENCE UNSPECIFIED (HCC): ICD-10-CM

## 2025-05-30 RX ORDER — TIRZEPATIDE 10 MG/.5ML
10 INJECTION, SOLUTION SUBCUTANEOUS WEEKLY
Qty: 2 ML | Refills: 0 | Status: SHIPPED | OUTPATIENT
Start: 2025-05-30

## 2025-06-25 DIAGNOSIS — E11.3293 TYPE 2 DIABETES MELLITUS WITH MILD NONPROLIFERATIVE RETINOPATHY OF BOTH EYES, WITHOUT LONG-TERM CURRENT USE OF INSULIN, MACULAR EDEMA PRESENCE UNSPECIFIED (HCC): ICD-10-CM

## 2025-06-26 RX ORDER — TIRZEPATIDE 10 MG/.5ML
INJECTION, SOLUTION SUBCUTANEOUS
Qty: 2 ML | Refills: 0 | Status: SHIPPED | OUTPATIENT
Start: 2025-06-26

## 2025-06-27 NOTE — TELEPHONE ENCOUNTER
Needs ECG first. After I review it, will send clearance letter.      MD Garland  [place], [place]       Re: Den Rick     : 1951    Dear Dr. Haque,  Mr. Rick has a h/o coronary artery disease, old myocardial infarction,  hypertension and glucose intolerance.  He is cleared for shoulder surgery and anesthesia.    Sincerely,      VAMSHI Kerr, FACC, FASE   Preventive Cardiology  Tel: [phone]         Pt called today requesting a refill for Glipizide 90 days supply to his [facility] pharmacy, I did call pt back to let hm know that he is due for a physical and f/u DM  Pt will call us back next month since he is getting his new insurance to schedule an appointment  Vaginal

## 2025-07-23 DIAGNOSIS — E11.3293 TYPE 2 DIABETES MELLITUS WITH MILD NONPROLIFERATIVE RETINOPATHY OF BOTH EYES, WITHOUT LONG-TERM CURRENT USE OF INSULIN, MACULAR EDEMA PRESENCE UNSPECIFIED (HCC): ICD-10-CM

## 2025-07-24 RX ORDER — TIRZEPATIDE 10 MG/.5ML
INJECTION, SOLUTION SUBCUTANEOUS
Qty: 2 ML | Refills: 2 | Status: SHIPPED | OUTPATIENT
Start: 2025-07-24

## 2025-07-30 ENCOUNTER — OFFICE VISIT (OUTPATIENT)
Dept: FAMILY MEDICINE CLINIC | Facility: CLINIC | Age: 65
End: 2025-07-30
Payer: COMMERCIAL

## 2025-07-30 VITALS
DIASTOLIC BLOOD PRESSURE: 82 MMHG | SYSTOLIC BLOOD PRESSURE: 120 MMHG | OXYGEN SATURATION: 97 % | WEIGHT: 210.4 LBS | HEART RATE: 89 BPM | HEIGHT: 69 IN | BODY MASS INDEX: 31.16 KG/M2

## 2025-07-30 DIAGNOSIS — Z12.83 SKIN CANCER SCREENING: ICD-10-CM

## 2025-07-30 DIAGNOSIS — E11.3293 TYPE 2 DIABETES MELLITUS WITH MILD NONPROLIFERATIVE RETINOPATHY OF BOTH EYES, WITHOUT LONG-TERM CURRENT USE OF INSULIN, MACULAR EDEMA PRESENCE UNSPECIFIED (HCC): ICD-10-CM

## 2025-07-30 DIAGNOSIS — L57.0 ACTINIC KERATOSIS: ICD-10-CM

## 2025-07-30 DIAGNOSIS — Z12.5 PROSTATE CANCER SCREENING: ICD-10-CM

## 2025-07-30 DIAGNOSIS — Z00.00 ANNUAL PHYSICAL EXAM: Primary | ICD-10-CM

## 2025-07-30 DIAGNOSIS — E78.5 HYPERLIPIDEMIA, UNSPECIFIED HYPERLIPIDEMIA TYPE: ICD-10-CM

## 2025-07-30 DIAGNOSIS — G47.30 SLEEP APNEA, UNSPECIFIED TYPE: ICD-10-CM

## 2025-07-30 DIAGNOSIS — Z12.11 COLON CANCER SCREENING: ICD-10-CM

## 2025-07-30 LAB — SL AMB POCT HEMOGLOBIN AIC: 7.7 (ref ?–6.5)

## 2025-07-30 PROCEDURE — 83036 HEMOGLOBIN GLYCOSYLATED A1C: CPT | Performed by: PHYSICIAN ASSISTANT

## 2025-07-30 PROCEDURE — 99397 PER PM REEVAL EST PAT 65+ YR: CPT | Performed by: PHYSICIAN ASSISTANT

## 2025-07-30 PROCEDURE — 99214 OFFICE O/P EST MOD 30 MIN: CPT | Performed by: PHYSICIAN ASSISTANT

## 2025-07-30 RX ORDER — TIRZEPATIDE 12.5 MG/.5ML
12.5 INJECTION, SOLUTION SUBCUTANEOUS WEEKLY
Qty: 2 ML | Refills: 2 | Status: SHIPPED | OUTPATIENT
Start: 2025-07-30

## 2025-08-05 LAB
25(OH)D3 SERPL-MCNC: 40 NG/ML (ref 30–100)
ALBUMIN SERPL-MCNC: 4.5 G/DL (ref 3.6–5.1)
ALBUMIN/GLOB SERPL: 1.8 (CALC) (ref 1–2.5)
ALP SERPL-CCNC: 62 U/L (ref 35–144)
ALT SERPL-CCNC: 8 U/L (ref 9–46)
AST SERPL-CCNC: 12 U/L (ref 10–35)
BASOPHILS # BLD AUTO: 50 CELLS/UL (ref 0–200)
BASOPHILS NFR BLD AUTO: 0.7 %
BILIRUB SERPL-MCNC: 0.7 MG/DL (ref 0.2–1.2)
BUN SERPL-MCNC: 13 MG/DL (ref 7–25)
BUN/CREAT SERPL: ABNORMAL (CALC) (ref 6–22)
CALCIUM SERPL-MCNC: 9.6 MG/DL (ref 8.6–10.3)
CHLORIDE SERPL-SCNC: 104 MMOL/L (ref 98–110)
CHOLEST SERPL-MCNC: 163 MG/DL
CHOLEST/HDLC SERPL: 3.1 (CALC)
CO2 SERPL-SCNC: 27 MMOL/L (ref 20–32)
CREAT SERPL-MCNC: 0.74 MG/DL (ref 0.7–1.35)
EOSINOPHIL # BLD AUTO: 187 CELLS/UL (ref 15–500)
EOSINOPHIL NFR BLD AUTO: 2.6 %
ERYTHROCYTE [DISTWIDTH] IN BLOOD BY AUTOMATED COUNT: 12.9 % (ref 11–15)
GFR/BSA.PRED SERPLBLD CYS-BASED-ARV: 100 ML/MIN/1.73M2
GLOBULIN SER CALC-MCNC: 2.5 G/DL (CALC) (ref 1.9–3.7)
GLUCOSE SERPL-MCNC: 147 MG/DL (ref 65–99)
HCT VFR BLD AUTO: 43.3 % (ref 38.5–50)
HDLC SERPL-MCNC: 52 MG/DL
HGB BLD-MCNC: 13.6 G/DL (ref 13.2–17.1)
LDLC SERPL CALC-MCNC: 93 MG/DL (CALC)
LYMPHOCYTES # BLD AUTO: 1462 CELLS/UL (ref 850–3900)
LYMPHOCYTES NFR BLD AUTO: 20.3 %
MCH RBC QN AUTO: 27.6 PG (ref 27–33)
MCHC RBC AUTO-ENTMCNC: 31.4 G/DL (ref 32–36)
MCV RBC AUTO: 87.8 FL (ref 80–100)
MONOCYTES # BLD AUTO: 547 CELLS/UL (ref 200–950)
MONOCYTES NFR BLD AUTO: 7.6 %
NEUTROPHILS # BLD AUTO: 4954 CELLS/UL (ref 1500–7800)
NEUTROPHILS NFR BLD AUTO: 68.8 %
NONHDLC SERPL-MCNC: 111 MG/DL (CALC)
PLATELET # BLD AUTO: 315 THOUSAND/UL (ref 140–400)
PMV BLD REES-ECKER: 9.2 FL (ref 7.5–12.5)
POTASSIUM SERPL-SCNC: 4.2 MMOL/L (ref 3.5–5.3)
PROT SERPL-MCNC: 7 G/DL (ref 6.1–8.1)
RBC # BLD AUTO: 4.93 MILLION/UL (ref 4.2–5.8)
SODIUM SERPL-SCNC: 140 MMOL/L (ref 135–146)
TRIGL SERPL-MCNC: 90 MG/DL
TSH SERPL-ACNC: 0.79 MIU/L (ref 0.4–4.5)
WBC # BLD AUTO: 7.2 THOUSAND/UL (ref 3.8–10.8)

## 2025-08-22 DIAGNOSIS — E11.65 POORLY CONTROLLED DIABETES MELLITUS (HCC): ICD-10-CM
